# Patient Record
Sex: MALE | Race: WHITE | NOT HISPANIC OR LATINO | Employment: OTHER | URBAN - METROPOLITAN AREA
[De-identification: names, ages, dates, MRNs, and addresses within clinical notes are randomized per-mention and may not be internally consistent; named-entity substitution may affect disease eponyms.]

---

## 2023-01-28 PROBLEM — J45.909 ASTHMA (HHS-HCC): Status: ACTIVE | Noted: 2023-01-28

## 2023-01-28 PROBLEM — J44.9 COPD (CHRONIC OBSTRUCTIVE PULMONARY DISEASE) (MULTI): Status: ACTIVE | Noted: 2023-01-28

## 2023-01-28 PROBLEM — I70.212 ATHEROSCLEROSIS OF NATIVE ARTERY OF LEFT LOWER EXTREMITY WITH INTERMITTENT CLAUDICATION (CMS-HCC): Status: ACTIVE | Noted: 2023-01-28

## 2023-01-28 PROBLEM — G47.10 HYPERSOMNIA WITH SLEEP APNEA: Status: ACTIVE | Noted: 2023-01-28

## 2023-01-28 PROBLEM — M48.07 SPINAL STENOSIS OF LUMBOSACRAL REGION: Status: ACTIVE | Noted: 2023-01-28

## 2023-01-28 PROBLEM — R13.10 DYSPHAGIA: Status: ACTIVE | Noted: 2023-01-28

## 2023-01-28 PROBLEM — R45.5 HOSTILE BEHAVIOR: Status: ACTIVE | Noted: 2023-01-28

## 2023-01-28 PROBLEM — I73.9 PERIPHERAL VASCULAR DISEASE (CMS-HCC): Status: ACTIVE | Noted: 2023-01-28

## 2023-01-28 PROBLEM — F43.10 PTSD (POST-TRAUMATIC STRESS DISORDER): Status: ACTIVE | Noted: 2023-01-28

## 2023-01-28 PROBLEM — G89.4 CHRONIC PAIN DISORDER: Status: ACTIVE | Noted: 2023-01-28

## 2023-01-28 PROBLEM — R46.89: Status: ACTIVE | Noted: 2023-01-28

## 2023-01-28 PROBLEM — F11.20 OPIOID DEPENDENCE ON MAINTENANCE AGONIST THERAPY, NO SYMPTOMS (MULTI): Status: ACTIVE | Noted: 2023-01-28

## 2023-01-28 PROBLEM — M47.816 LUMBAR SPONDYLOSIS: Status: ACTIVE | Noted: 2023-01-28

## 2023-01-28 PROBLEM — M51.369 DEGENERATION OF INTERVERTEBRAL DISC OF LUMBAR REGION: Status: ACTIVE | Noted: 2023-01-28

## 2023-01-28 PROBLEM — B19.20 HEPATITIS-C: Status: ACTIVE | Noted: 2023-01-28

## 2023-01-28 PROBLEM — F41.9 ANXIETY: Status: ACTIVE | Noted: 2023-01-28

## 2023-01-28 PROBLEM — G62.9 PERIPHERAL NEUROPATHY: Status: ACTIVE | Noted: 2023-01-28

## 2023-01-28 PROBLEM — M54.16 LUMBAR RADICULOPATHY: Status: ACTIVE | Noted: 2023-01-28

## 2023-01-28 PROBLEM — M54.9 BACK PAIN: Status: ACTIVE | Noted: 2023-01-28

## 2023-01-28 PROBLEM — E78.5 HYPERLIPIDEMIA: Status: ACTIVE | Noted: 2023-01-28

## 2023-01-28 PROBLEM — E55.9 VITAMIN D DEFICIENCY: Status: ACTIVE | Noted: 2023-01-28

## 2023-01-28 PROBLEM — M79.606 LOWER EXTREMITY PAIN: Status: ACTIVE | Noted: 2023-01-28

## 2023-01-28 PROBLEM — I77.1 STENOSIS OF ILIAC ARTERY (CMS-HCC): Status: ACTIVE | Noted: 2023-01-28

## 2023-01-28 PROBLEM — G47.30 HYPERSOMNIA WITH SLEEP APNEA: Status: ACTIVE | Noted: 2023-01-28

## 2023-01-28 PROBLEM — F17.200 NICOTINE DEPENDENCE: Status: ACTIVE | Noted: 2023-01-28

## 2023-01-28 PROBLEM — E53.9 VITAMIN B DEFICIENCY: Status: ACTIVE | Noted: 2023-01-28

## 2023-01-28 PROBLEM — K75.9 HEPATITIS: Status: ACTIVE | Noted: 2023-01-28

## 2023-01-28 PROBLEM — M51.36 DEGENERATION OF INTERVERTEBRAL DISC OF LUMBAR REGION: Status: ACTIVE | Noted: 2023-01-28

## 2023-01-28 PROBLEM — J30.9 ALLERGIC RHINITIS: Status: ACTIVE | Noted: 2023-01-28

## 2023-01-28 PROBLEM — G47.9 SLEEP DISORDER: Status: ACTIVE | Noted: 2023-01-28

## 2023-01-28 PROBLEM — F32.0 DEPRESSION, MAJOR, SINGLE EPISODE, MILD (CMS-HCC): Status: ACTIVE | Noted: 2023-01-28

## 2023-01-28 PROBLEM — I10 HYPERTENSION: Status: ACTIVE | Noted: 2023-01-28

## 2023-01-28 RX ORDER — AMLODIPINE BESYLATE 5 MG/1
1 TABLET ORAL DAILY
COMMUNITY
End: 2023-03-13 | Stop reason: SDUPTHER

## 2023-01-28 RX ORDER — ATORVASTATIN CALCIUM 10 MG/1
1 TABLET, FILM COATED ORAL NIGHTLY
COMMUNITY
End: 2023-03-13 | Stop reason: SDUPTHER

## 2023-01-28 RX ORDER — BUPRENORPHINE AND NALOXONE 8; 2 MG/1; MG/1
1 FILM, SOLUBLE BUCCAL; SUBLINGUAL 2 TIMES DAILY
COMMUNITY

## 2023-01-28 RX ORDER — CLOPIDOGREL BISULFATE 75 MG/1
1 TABLET ORAL DAILY
COMMUNITY
End: 2024-04-18 | Stop reason: SDUPTHER

## 2023-01-28 RX ORDER — NALOXONE HYDROCHLORIDE 4 MG/.1ML
4 SPRAY NASAL
COMMUNITY

## 2023-01-28 RX ORDER — TIOTROPIUM BROMIDE AND OLODATEROL 3.124; 2.736 UG/1; UG/1
2 SPRAY, METERED RESPIRATORY (INHALATION) DAILY
COMMUNITY

## 2023-01-28 RX ORDER — CLONIDINE HYDROCHLORIDE 0.1 MG/1
1 TABLET ORAL NIGHTLY
COMMUNITY
End: 2023-03-13 | Stop reason: SDUPTHER

## 2023-01-28 RX ORDER — ALBUTEROL SULFATE 90 UG/1
1-2 AEROSOL, METERED RESPIRATORY (INHALATION) EVERY 4 HOURS PRN
COMMUNITY
End: 2023-09-20 | Stop reason: SDUPTHER

## 2023-01-28 RX ORDER — IPRATROPIUM BROMIDE AND ALBUTEROL SULFATE 2.5; .5 MG/3ML; MG/3ML
SOLUTION RESPIRATORY (INHALATION) 4 TIMES DAILY
COMMUNITY
End: 2023-09-20 | Stop reason: SDUPTHER

## 2023-01-28 RX ORDER — LISINOPRIL 40 MG/1
1 TABLET ORAL DAILY
COMMUNITY
End: 2023-03-13 | Stop reason: SDUPTHER

## 2023-03-13 ENCOUNTER — OFFICE VISIT (OUTPATIENT)
Dept: PRIMARY CARE | Facility: CLINIC | Age: 73
End: 2023-03-13
Payer: MEDICARE

## 2023-03-13 VITALS
HEART RATE: 83 BPM | SYSTOLIC BLOOD PRESSURE: 110 MMHG | HEIGHT: 68 IN | RESPIRATION RATE: 14 BRPM | WEIGHT: 167 LBS | DIASTOLIC BLOOD PRESSURE: 80 MMHG | BODY MASS INDEX: 25.31 KG/M2 | OXYGEN SATURATION: 98 %

## 2023-03-13 DIAGNOSIS — I70.212 ATHEROSCLEROSIS OF NATIVE ARTERY OF LEFT LOWER EXTREMITY WITH INTERMITTENT CLAUDICATION (CMS-HCC): ICD-10-CM

## 2023-03-13 DIAGNOSIS — E78.2 MIXED HYPERLIPIDEMIA: ICD-10-CM

## 2023-03-13 DIAGNOSIS — E53.9 VITAMIN B DEFICIENCY: ICD-10-CM

## 2023-03-13 DIAGNOSIS — J42 CHRONIC BRONCHITIS, UNSPECIFIED CHRONIC BRONCHITIS TYPE (MULTI): Primary | ICD-10-CM

## 2023-03-13 DIAGNOSIS — F17.219 CIGARETTE NICOTINE DEPENDENCE WITH NICOTINE-INDUCED DISORDER: ICD-10-CM

## 2023-03-13 DIAGNOSIS — R13.10 DYSPHAGIA, UNSPECIFIED TYPE: ICD-10-CM

## 2023-03-13 DIAGNOSIS — I10 PRIMARY HYPERTENSION: ICD-10-CM

## 2023-03-13 DIAGNOSIS — J45.909 ASTHMA, UNSPECIFIED ASTHMA SEVERITY, UNSPECIFIED WHETHER COMPLICATED, UNSPECIFIED WHETHER PERSISTENT (HHS-HCC): ICD-10-CM

## 2023-03-13 DIAGNOSIS — E55.9 VITAMIN D DEFICIENCY: ICD-10-CM

## 2023-03-13 PROCEDURE — 3074F SYST BP LT 130 MM HG: CPT | Performed by: CLINICAL NURSE SPECIALIST

## 2023-03-13 PROCEDURE — 1159F MED LIST DOCD IN RCRD: CPT | Performed by: CLINICAL NURSE SPECIALIST

## 2023-03-13 PROCEDURE — 99214 OFFICE O/P EST MOD 30 MIN: CPT | Performed by: CLINICAL NURSE SPECIALIST

## 2023-03-13 PROCEDURE — 3079F DIAST BP 80-89 MM HG: CPT | Performed by: CLINICAL NURSE SPECIALIST

## 2023-03-13 RX ORDER — ATORVASTATIN CALCIUM 10 MG/1
10 TABLET, FILM COATED ORAL NIGHTLY
Qty: 90 TABLET | Refills: 1 | Status: SHIPPED | OUTPATIENT
Start: 2023-03-13 | End: 2023-06-28

## 2023-03-13 RX ORDER — LISINOPRIL 40 MG/1
40 TABLET ORAL DAILY
Qty: 90 TABLET | Refills: 1 | Status: SHIPPED | OUTPATIENT
Start: 2023-03-13 | End: 2023-07-07

## 2023-03-13 RX ORDER — CLONIDINE HYDROCHLORIDE 0.1 MG/1
0.1 TABLET ORAL NIGHTLY
Qty: 90 TABLET | Refills: 1 | Status: SHIPPED | OUTPATIENT
Start: 2023-03-13 | End: 2024-01-15

## 2023-03-13 RX ORDER — AMLODIPINE BESYLATE 5 MG/1
5 TABLET ORAL DAILY
Qty: 90 TABLET | Refills: 1 | Status: SHIPPED | OUTPATIENT
Start: 2023-03-13 | End: 2023-06-28

## 2023-03-13 ASSESSMENT — ENCOUNTER SYMPTOMS
HEMATURIA: 0
ACTIVITY CHANGE: 0
DYSURIA: 0
DIARRHEA: 0
MYALGIAS: 0
NECK PAIN: 0
ABDOMINAL PAIN: 0
FEVER: 0
BRUISES/BLEEDS EASILY: 0
BLOOD IN STOOL: 0
POLYDIPSIA: 0
UNEXPECTED WEIGHT CHANGE: 0
SORE THROAT: 0
HEADACHES: 0
TROUBLE SWALLOWING: 0
BACK PAIN: 0
CONFUSION: 0
CHILLS: 0
PALPITATIONS: 0
SLEEP DISTURBANCE: 0
EYE PAIN: 0
DEPRESSION: 0
FATIGUE: 0
SEIZURES: 0
ARTHRALGIAS: 1
COUGH: 0
PHOTOPHOBIA: 0
CHEST TIGHTNESS: 0
DIZZINESS: 0
SHORTNESS OF BREATH: 0
LOSS OF SENSATION IN FEET: 0
FLANK PAIN: 0
JOINT SWELLING: 0
WOUND: 0
APPETITE CHANGE: 0
CONSTIPATION: 0
OCCASIONAL FEELINGS OF UNSTEADINESS: 0
NAUSEA: 0
WHEEZING: 0
VOMITING: 0

## 2023-03-13 ASSESSMENT — PATIENT HEALTH QUESTIONNAIRE - PHQ9
1. LITTLE INTEREST OR PLEASURE IN DOING THINGS: NOT AT ALL
SUM OF ALL RESPONSES TO PHQ9 QUESTIONS 1 AND 2: 0
2. FEELING DOWN, DEPRESSED OR HOPELESS: NOT AT ALL

## 2023-03-13 NOTE — PROGRESS NOTES
Subjective   Patient ID: Joe Hester is a 72 y.o. male who presents for No chief complaint on file..  Here today for a follow up.     Previously seen with complaints of bilateral lower extremity discomfort. Following with Dr. Marte for Vascular. Referred to Dr. Bucio for spine evaluation of Neuropathic Pain, Bilateral lower extremities. Patient has Medical Marijuana.     Has been [facility] in [facility]. Patient states that he is down to 5 cigarettes per day, continuing to work on Cessation. Now following with Pulmonology. Chronic shortness of breath with exertion. COPD. Patient follows with Dr. Engel for Pulmonology. Inhalers adjusted.    Lisinopril, Amlodipine, and Clonidine for his Hypertension, tolerating Rx. Has been on Suboxone, [facility].     Trouble with Anxiety and Stress. PTSD related. Did not tolerate medications in the past. Triggered by his history of Incarceration. Hopeful that Medical Marijuana will continue to provide him some benefit. Has noted some improvement with starting the medical marijuana.     Now following with [facility] for Suboxone. Following twice a month. Suboxone dosage has been the same. Planing to transition from the Strips to the oral medication.     States that years ago had his Esophagus stretched during EGD. Complaints of issues with swallowing again, interest in referral.       Review of Systems   Constitutional:  Negative for activity change, appetite change, chills, fatigue, fever and unexpected weight change.   HENT:  Negative for ear pain, hearing loss, nosebleeds, sore throat, tinnitus and trouble swallowing.    Eyes:  Negative for photophobia, pain and visual disturbance.   Respiratory:  Negative for cough, chest tightness, shortness of breath and wheezing.    Cardiovascular:  Negative for chest pain, palpitations and leg swelling.   Gastrointestinal:  Negative for abdominal pain, blood in stool, constipation, diarrhea, nausea  and vomiting.   Endocrine: Negative for cold intolerance, heat intolerance, polydipsia and polyuria.   Genitourinary:  Negative for dysuria, flank pain and hematuria.   Musculoskeletal:  Positive for arthralgias. Negative for back pain, joint swelling, myalgias and neck pain.   Skin:  Negative for pallor, rash and wound.   Allergic/Immunologic: Negative for immunocompromised state.   Neurological:  Negative for dizziness, seizures and headaches.   Hematological:  Does not bruise/bleed easily.   Psychiatric/Behavioral:  Negative for confusion and sleep disturbance.        Objective   Physical Exam  Constitutional:       General: He is not in acute distress.     Appearance: Normal appearance.   HENT:      Head: Normocephalic.      Nose: Nose normal.   Eyes:      Conjunctiva/sclera: Conjunctivae normal.   Neck:      Vascular: No carotid bruit.   Cardiovascular:      Rate and Rhythm: Normal rate and regular rhythm.      Pulses: Normal pulses.      Heart sounds: Normal heart sounds.   Pulmonary:      Effort: Pulmonary effort is normal.      Breath sounds: Normal breath sounds.   Abdominal:      General: Bowel sounds are normal.      Palpations: Abdomen is soft.   Musculoskeletal:         General: Normal range of motion.      Cervical back: Normal range of motion.   Skin:     General: Skin is warm and dry.   Neurological:      Mental Status: He is alert and oriented to person, place, and time. Mental status is at baseline.   Psychiatric:         Mood and Affect: Mood normal.         Behavior: Behavior normal.       Assessment/Plan         New order for blood work provided at OV today.     #1. Hypertension: Blood pressure controlled at OV today. Continue Lisinopril, Clonidine and Norvasc. DASH diet. Ambulatory blood pressure monitoring.   #2. COPD: Chronic shortness of breath with exertion. Following with Pulmonology.   #3. Anxiety and Depression: PHQ-9 and ASYA-7 Completed previously. Did not tolerate Wellbutrin. States  that he was previously on multiple medications. Prozac, Xanax, Trazodone and Zoloft. Unsure of the other medications. Discontinued Lexapro. Ativan and Klonopin PRN, did not tolerate. Valium PRN effective in the past. Unable to continue to prescribe due to inconsistencies with Drug Screen. Patient aware. Now following for Medical Marijuana.   #4. Tobacco Use: Patient advised to quit smoking tobacco. The risks of smoking were reviewed with the patient and he was offered medication and/or smoking cessation counseling to help. Did not tolerate Wellbutrin. Declined any other Rx. at this time. Currently smoking 5 cigarettes/day.  #5. Vitamin B12 Deficiency: Continue Vitamin B12 OTC. Reevaluate with next blood work.  #6. Vitamin D Deficiency: Reevaluate with next blood work.  #7. Hepatitis C: Completed treatment through GI previously years ago. Recent testing indicated Viral Load not detected.   #8. 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 Stenosis of Iliac Artery, PVD: Following with Dr. Marte for management/evaluation. Continue Plavix. Encouraged smoking cessation.   #10. Hyperlipidemia: Lipitor. Lifestyle changes. Reevaluate with next lab work.   #11. Opioid Dependence: Following with Dr. Drew for management with Suboxone.     PSA Exam: May 2017. New order provided at OV today.   Colonoscopy: January 2016. Recommended to repeat in 10 years.   Medicare Wellness: September 2022.   Flu Vaccine: October 2020  Pneumovax: December 2020   Prevnar: December 2017.   COVID Vaccine: March/November 2021. Discussed Booster.  Flu Vaccine: September 2022

## 2023-04-13 ENCOUNTER — OFFICE VISIT (OUTPATIENT)
Dept: PRIMARY CARE | Facility: CLINIC | Age: 73
End: 2023-04-13
Payer: MEDICARE

## 2023-04-13 VITALS
HEART RATE: 73 BPM | BODY MASS INDEX: 23.64 KG/M2 | DIASTOLIC BLOOD PRESSURE: 68 MMHG | HEIGHT: 68 IN | SYSTOLIC BLOOD PRESSURE: 140 MMHG | WEIGHT: 156 LBS

## 2023-04-13 DIAGNOSIS — J42 CHRONIC BRONCHITIS, UNSPECIFIED CHRONIC BRONCHITIS TYPE (MULTI): ICD-10-CM

## 2023-04-13 DIAGNOSIS — M54.16 LUMBAR RADICULOPATHY: Primary | ICD-10-CM

## 2023-04-13 DIAGNOSIS — J45.909 ASTHMA, UNSPECIFIED ASTHMA SEVERITY, UNSPECIFIED WHETHER COMPLICATED, UNSPECIFIED WHETHER PERSISTENT (HHS-HCC): ICD-10-CM

## 2023-04-13 DIAGNOSIS — I73.9 PERIPHERAL VASCULAR DISEASE (CMS-HCC): ICD-10-CM

## 2023-04-13 DIAGNOSIS — I70.212 ATHEROSCLEROSIS OF NATIVE ARTERY OF LEFT LOWER EXTREMITY WITH INTERMITTENT CLAUDICATION (CMS-HCC): ICD-10-CM

## 2023-04-13 DIAGNOSIS — I10 PRIMARY HYPERTENSION: ICD-10-CM

## 2023-04-13 PROCEDURE — 1159F MED LIST DOCD IN RCRD: CPT | Performed by: CLINICAL NURSE SPECIALIST

## 2023-04-13 PROCEDURE — 3077F SYST BP >= 140 MM HG: CPT | Performed by: CLINICAL NURSE SPECIALIST

## 2023-04-13 PROCEDURE — 1160F RVW MEDS BY RX/DR IN RCRD: CPT | Performed by: CLINICAL NURSE SPECIALIST

## 2023-04-13 PROCEDURE — 99214 OFFICE O/P EST MOD 30 MIN: CPT | Performed by: CLINICAL NURSE SPECIALIST

## 2023-04-13 PROCEDURE — 3078F DIAST BP <80 MM HG: CPT | Performed by: CLINICAL NURSE SPECIALIST

## 2023-04-13 RX ORDER — CHLORTHALIDONE 25 MG/1
25 TABLET ORAL DAILY
Qty: 30 TABLET | Refills: 2 | Status: SHIPPED | OUTPATIENT
Start: 2023-04-13 | End: 2023-05-13

## 2023-04-13 RX ORDER — GABAPENTIN 300 MG/1
300 CAPSULE ORAL NIGHTLY
Qty: 30 CAPSULE | Refills: 2 | Status: SHIPPED | OUTPATIENT
Start: 2023-04-13 | End: 2023-07-12

## 2023-04-13 ASSESSMENT — PATIENT HEALTH QUESTIONNAIRE - PHQ9
2. FEELING DOWN, DEPRESSED OR HOPELESS: NOT AT ALL
1. LITTLE INTEREST OR PLEASURE IN DOING THINGS: NOT AT ALL
SUM OF ALL RESPONSES TO PHQ9 QUESTIONS 1 AND 2: 0

## 2023-04-13 ASSESSMENT — COLUMBIA-SUICIDE SEVERITY RATING SCALE - C-SSRS
6. HAVE YOU EVER DONE ANYTHING, STARTED TO DO ANYTHING, OR PREPARED TO DO ANYTHING TO END YOUR LIFE?: NO
2. HAVE YOU ACTUALLY HAD ANY THOUGHTS OF KILLING YOURSELF?: NO
1. IN THE PAST MONTH, HAVE YOU WISHED YOU WERE DEAD OR WISHED YOU COULD GO TO SLEEP AND NOT WAKE UP?: NO

## 2023-04-13 ASSESSMENT — ENCOUNTER SYMPTOMS
OCCASIONAL FEELINGS OF UNSTEADINESS: 0
LOSS OF SENSATION IN FEET: 0
DEPRESSION: 0

## 2023-04-13 NOTE — PROGRESS NOTES
Subjective   Patient ID: Joe Hester is a 72 y.o. male who presents for Follow-up (Discuss blood pressure running high).  Here today for a follow up, blood pressure has been running high.     Previously seen with complaints of bilateral lower extremity discomfort. Following with Dr. Marte for Vascular. Referred to Dr. Bucio for spine evaluation of Neuropathic Pain, Bilateral lower extremities. Patient has Medical Marijuana. Requesting to restart Gabapentin.     Has been living in [facility]. Patient states that he is down to 5 cigarettes per day, continuing to work on Cessation. Now following with Pulmonology. Chronic shortness of breath with exertion. COPD. Patient follows with Dr. Engel for Pulmonology. Inhalers adjusted.    Lisinopril, Amlodipine, and Clonidine for his Hypertension, tolerating Rx. Has been on Suboxone, following [facility] at [facility]. Blood pressure has been elevated over the past month.     Trouble with Anxiety and Stress. PTSD related. Did not tolerate medications in the past. Triggered by his history of Incarceration. Hopeful that Medical Marijuana will continue to provide him some benefit. Has noted some improvement with starting the medical marijuana.     Now following with [facility] for Suboxone. Following twice a month. Suboxone dosage has been the same. Changed to the oral medication from the Strips and is not happy with how it is working for him. Planning to discuss restarting strips.     States that years ago had his Esophagus stretched during EGD. Complaints of issues with swallowing again, interest in referral. Provided at last OV.     Review of Systems   Constitutional:  Negative for activity change, appetite change, chills, fatigue, fever and unexpected weight change.   HENT:  Negative for ear pain, hearing loss, nosebleeds, sore throat, tinnitus and trouble swallowing.    Eyes:  Negative for photophobia, pain and visual disturbance.   Respiratory:   Negative for cough, chest tightness, shortness of breath and wheezing.    Cardiovascular:  Negative for chest pain, palpitations and leg swelling.   Gastrointestinal:  Negative for abdominal pain, blood in stool, constipation, diarrhea, nausea and vomiting.   Endocrine: Negative for cold intolerance, heat intolerance, polydipsia and polyuria.   Genitourinary:  Negative for dysuria, flank pain and hematuria.   Musculoskeletal:  Positive for arthralgias. Negative for back pain, joint swelling, myalgias and neck pain.   Skin:  Negative for pallor, rash and wound.   Allergic/Immunologic: Negative for immunocompromised state.   Neurological:  Negative for dizziness, seizures and headaches.   Hematological:  Does not bruise/bleed easily.   Psychiatric/Behavioral:  Negative for confusion and sleep disturbance.        Objective   Physical Exam  Constitutional:       General: He is not in acute distress.     Appearance: Normal appearance.   HENT:      Head: Normocephalic.      Nose: Nose normal.   Eyes:      Conjunctiva/sclera: Conjunctivae normal.   Neck:      Vascular: No carotid bruit.   Cardiovascular:      Rate and Rhythm: Normal rate and regular rhythm.      Pulses: Normal pulses.      Heart sounds: Normal heart sounds.   Pulmonary:      Effort: Pulmonary effort is normal.      Breath sounds: Normal breath sounds.   Abdominal:      General: Bowel sounds are normal.      Palpations: Abdomen is soft.   Musculoskeletal:         General: Normal range of motion.      Cervical back: Normal range of motion.   Skin:     General: Skin is warm and dry.   Neurological:      Mental Status: He is alert and oriented to person, place, and time. Mental status is at baseline.   Psychiatric:         Mood and Affect: Mood normal.         Behavior: Behavior normal.       Assessment/Plan     New order for blood work provided at  today.     #1. Hypertension: Blood pressure uncontrolled at  today. Continue Lisinopril, Clonidine and  Norvasc. DASH diet. Ambulatory blood pressure monitoring. Add Chlorthalidone. Blood work to be done and follow up in 1 month.   #2. COPD: Chronic shortness of breath with exertion. Following with Pulmonology.   #3. Anxiety and Depression: PHQ-9 and ASYA-7 Completed previously. Did not tolerate Wellbutrin. States that he was previously on multiple medications. Prozac, Xanax, Trazodone and Zoloft. Unsure of the other medications. Discontinued Lexapro. Ativan and Klonopin PRN, did not tolerate. Valium PRN effective in the past. Unable to continue to prescribe due to inconsistencies with Drug Screen. Patient aware. Now following for Medical Marijuana.   #4. Tobacco Use: Patient advised to quit smoking tobacco. The risks of smoking were reviewed with the patient and he was offered medication and/or smoking cessation counseling to help. Did not tolerate Wellbutrin. Declined any other Rx. at this time. Currently smoking 5 cigarettes/day.  #5. Vitamin B12 Deficiency: Continue Vitamin B12 OTC. Reevaluate with next blood work.  #6. Vitamin D Deficiency: Reevaluate with next blood work.  #7. Hepatitis C: Completed treatment through GI previously years ago. Recent testing indicated Viral Load not detected.   #8. 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 Stenosis of Iliac Artery, PVD: Following with Dr. Marte for management/evaluation. Continue Plavix. Encouraged smoking cessation.   #10. Hyperlipidemia: Lipitor. Lifestyle changes. Reevaluate with next lab work.   #11. Opioid Dependence: Following with Specialist for management with Suboxone.     PSA Exam: May 2017. New order provided.  Colonoscopy: January 2016. Recommended to repeat in 10 years.   Medicare Wellness: September 2022.   Flu Vaccine: October 2020  Pneumovax: December 2020   Prevnar: December 2017.   COVID Vaccine: March/November 2021. Discussed Booster.  Flu Vaccine: September 2022

## 2023-05-04 ENCOUNTER — HOSPITAL ENCOUNTER (OUTPATIENT)
Dept: DATA CONVERSION | Facility: HOSPITAL | Age: 73
End: 2023-05-04
Attending: INTERNAL MEDICINE | Admitting: INTERNAL MEDICINE
Payer: MEDICARE

## 2023-05-04 DIAGNOSIS — I73.9 PERIPHERAL VASCULAR DISEASE, UNSPECIFIED (CMS-HCC): ICD-10-CM

## 2023-05-04 DIAGNOSIS — Z79.02 LONG TERM (CURRENT) USE OF ANTITHROMBOTICS/ANTIPLATELETS: ICD-10-CM

## 2023-05-04 DIAGNOSIS — K29.70 GASTRITIS, UNSPECIFIED, WITHOUT BLEEDING: ICD-10-CM

## 2023-05-04 DIAGNOSIS — K31.9 DISEASE OF STOMACH AND DUODENUM, UNSPECIFIED: ICD-10-CM

## 2023-05-04 DIAGNOSIS — E78.5 HYPERLIPIDEMIA, UNSPECIFIED: ICD-10-CM

## 2023-05-04 DIAGNOSIS — Z79.01 LONG TERM (CURRENT) USE OF ANTICOAGULANTS: ICD-10-CM

## 2023-05-04 DIAGNOSIS — F41.9 ANXIETY DISORDER, UNSPECIFIED: ICD-10-CM

## 2023-05-04 DIAGNOSIS — I10 ESSENTIAL (PRIMARY) HYPERTENSION: ICD-10-CM

## 2023-05-04 DIAGNOSIS — J44.9 CHRONIC OBSTRUCTIVE PULMONARY DISEASE, UNSPECIFIED (MULTI): ICD-10-CM

## 2023-05-04 DIAGNOSIS — F32.A DEPRESSION, UNSPECIFIED: ICD-10-CM

## 2023-05-04 DIAGNOSIS — R13.10 DYSPHAGIA, UNSPECIFIED: ICD-10-CM

## 2023-05-04 DIAGNOSIS — F17.210 NICOTINE DEPENDENCE, CIGARETTES, UNCOMPLICATED: ICD-10-CM

## 2023-05-04 DIAGNOSIS — G89.4 CHRONIC PAIN SYNDROME: ICD-10-CM

## 2023-05-04 DIAGNOSIS — F43.10 POST-TRAUMATIC STRESS DISORDER, UNSPECIFIED: ICD-10-CM

## 2023-05-11 LAB
COMPLETE PATHOLOGY REPORT: NORMAL
CONVERTED CLINICAL DIAGNOSIS-HISTORY: NORMAL
CONVERTED FINAL DIAGNOSIS: NORMAL
CONVERTED FINAL REPORT PDF LINK TO COPY AND PASTE: NORMAL
CONVERTED GROSS DESCRIPTION: NORMAL

## 2023-05-19 ENCOUNTER — APPOINTMENT (OUTPATIENT)
Dept: PRIMARY CARE | Facility: CLINIC | Age: 73
End: 2023-05-19
Payer: MEDICARE

## 2023-06-28 DIAGNOSIS — I10 PRIMARY HYPERTENSION: ICD-10-CM

## 2023-06-28 DIAGNOSIS — E53.9 VITAMIN B DEFICIENCY: ICD-10-CM

## 2023-06-28 DIAGNOSIS — I70.212 ATHEROSCLEROSIS OF NATIVE ARTERY OF LEFT LOWER EXTREMITY WITH INTERMITTENT CLAUDICATION (CMS-HCC): ICD-10-CM

## 2023-06-28 DIAGNOSIS — E55.9 VITAMIN D DEFICIENCY: ICD-10-CM

## 2023-06-28 DIAGNOSIS — F17.219 CIGARETTE NICOTINE DEPENDENCE WITH NICOTINE-INDUCED DISORDER: ICD-10-CM

## 2023-06-28 DIAGNOSIS — E78.2 MIXED HYPERLIPIDEMIA: ICD-10-CM

## 2023-06-28 DIAGNOSIS — J45.909 ASTHMA, UNSPECIFIED ASTHMA SEVERITY, UNSPECIFIED WHETHER COMPLICATED, UNSPECIFIED WHETHER PERSISTENT (HHS-HCC): ICD-10-CM

## 2023-06-28 DIAGNOSIS — J42 CHRONIC BRONCHITIS, UNSPECIFIED CHRONIC BRONCHITIS TYPE (MULTI): ICD-10-CM

## 2023-06-28 RX ORDER — AMLODIPINE BESYLATE 5 MG/1
5 TABLET ORAL DAILY
Qty: 90 TABLET | Refills: 1 | Status: SHIPPED | OUTPATIENT
Start: 2023-06-28 | End: 2024-01-16

## 2023-06-28 RX ORDER — ATORVASTATIN CALCIUM 10 MG/1
10 TABLET, FILM COATED ORAL NIGHTLY
Qty: 90 TABLET | Refills: 1 | Status: SHIPPED | OUTPATIENT
Start: 2023-06-28 | End: 2024-01-16

## 2023-07-07 DIAGNOSIS — F17.219 CIGARETTE NICOTINE DEPENDENCE WITH NICOTINE-INDUCED DISORDER: ICD-10-CM

## 2023-07-07 DIAGNOSIS — I70.212 ATHEROSCLEROSIS OF NATIVE ARTERY OF LEFT LOWER EXTREMITY WITH INTERMITTENT CLAUDICATION (CMS-HCC): ICD-10-CM

## 2023-07-07 DIAGNOSIS — J42 CHRONIC BRONCHITIS, UNSPECIFIED CHRONIC BRONCHITIS TYPE (MULTI): ICD-10-CM

## 2023-07-07 DIAGNOSIS — I10 PRIMARY HYPERTENSION: ICD-10-CM

## 2023-07-07 DIAGNOSIS — E53.9 VITAMIN B DEFICIENCY: ICD-10-CM

## 2023-07-07 DIAGNOSIS — J45.909 ASTHMA, UNSPECIFIED ASTHMA SEVERITY, UNSPECIFIED WHETHER COMPLICATED, UNSPECIFIED WHETHER PERSISTENT (HHS-HCC): ICD-10-CM

## 2023-07-07 DIAGNOSIS — E55.9 VITAMIN D DEFICIENCY: ICD-10-CM

## 2023-07-07 DIAGNOSIS — E78.2 MIXED HYPERLIPIDEMIA: ICD-10-CM

## 2023-07-07 RX ORDER — LISINOPRIL 40 MG/1
40 TABLET ORAL DAILY
Qty: 90 TABLET | Refills: 1 | Status: SHIPPED | OUTPATIENT
Start: 2023-07-07

## 2023-09-07 VITALS — BODY MASS INDEX: 23.72 KG/M2 | HEIGHT: 68 IN | WEIGHT: 156.53 LBS

## 2023-09-20 ENCOUNTER — OFFICE VISIT (OUTPATIENT)
Dept: PRIMARY CARE | Facility: CLINIC | Age: 73
End: 2023-09-20
Payer: MEDICARE

## 2023-09-20 VITALS
WEIGHT: 152 LBS | BODY MASS INDEX: 23.04 KG/M2 | HEIGHT: 68 IN | SYSTOLIC BLOOD PRESSURE: 126 MMHG | DIASTOLIC BLOOD PRESSURE: 80 MMHG | HEART RATE: 72 BPM

## 2023-09-20 DIAGNOSIS — J45.909 ASTHMA, UNSPECIFIED ASTHMA SEVERITY, UNSPECIFIED WHETHER COMPLICATED, UNSPECIFIED WHETHER PERSISTENT (HHS-HCC): ICD-10-CM

## 2023-09-20 DIAGNOSIS — I70.212 ATHEROSCLEROSIS OF NATIVE ARTERY OF LEFT LOWER EXTREMITY WITH INTERMITTENT CLAUDICATION (CMS-HCC): ICD-10-CM

## 2023-09-20 DIAGNOSIS — Z23 NEED FOR INFLUENZA VACCINATION: ICD-10-CM

## 2023-09-20 DIAGNOSIS — B18.2 CHRONIC HEPATITIS C WITHOUT HEPATIC COMA (MULTI): ICD-10-CM

## 2023-09-20 DIAGNOSIS — I10 PRIMARY HYPERTENSION: ICD-10-CM

## 2023-09-20 DIAGNOSIS — E55.9 VITAMIN D DEFICIENCY: ICD-10-CM

## 2023-09-20 DIAGNOSIS — Z00.00 MEDICARE ANNUAL WELLNESS VISIT, SUBSEQUENT: ICD-10-CM

## 2023-09-20 DIAGNOSIS — F11.20 OPIOID DEPENDENCE ON MAINTENANCE AGONIST THERAPY, NO SYMPTOMS (MULTI): ICD-10-CM

## 2023-09-20 DIAGNOSIS — E78.2 MIXED HYPERLIPIDEMIA: ICD-10-CM

## 2023-09-20 DIAGNOSIS — F32.0 DEPRESSION, MAJOR, SINGLE EPISODE, MILD (CMS-HCC): ICD-10-CM

## 2023-09-20 DIAGNOSIS — F17.219 CIGARETTE NICOTINE DEPENDENCE WITH NICOTINE-INDUCED DISORDER: ICD-10-CM

## 2023-09-20 DIAGNOSIS — Z12.5 PROSTATE CANCER SCREENING: Primary | ICD-10-CM

## 2023-09-20 PROCEDURE — 1160F RVW MEDS BY RX/DR IN RCRD: CPT | Performed by: CLINICAL NURSE SPECIALIST

## 2023-09-20 PROCEDURE — G0439 PPPS, SUBSEQ VISIT: HCPCS | Performed by: CLINICAL NURSE SPECIALIST

## 2023-09-20 PROCEDURE — 3079F DIAST BP 80-89 MM HG: CPT | Performed by: CLINICAL NURSE SPECIALIST

## 2023-09-20 PROCEDURE — 90662 IIV NO PRSV INCREASED AG IM: CPT | Performed by: CLINICAL NURSE SPECIALIST

## 2023-09-20 PROCEDURE — 99214 OFFICE O/P EST MOD 30 MIN: CPT | Performed by: CLINICAL NURSE SPECIALIST

## 2023-09-20 PROCEDURE — 1159F MED LIST DOCD IN RCRD: CPT | Performed by: CLINICAL NURSE SPECIALIST

## 2023-09-20 PROCEDURE — G0444 DEPRESSION SCREEN ANNUAL: HCPCS | Performed by: CLINICAL NURSE SPECIALIST

## 2023-09-20 PROCEDURE — 1170F FXNL STATUS ASSESSED: CPT | Performed by: CLINICAL NURSE SPECIALIST

## 2023-09-20 PROCEDURE — 4004F PT TOBACCO SCREEN RCVD TLK: CPT | Performed by: CLINICAL NURSE SPECIALIST

## 2023-09-20 PROCEDURE — G0008 ADMIN INFLUENZA VIRUS VAC: HCPCS | Performed by: CLINICAL NURSE SPECIALIST

## 2023-09-20 PROCEDURE — 3074F SYST BP LT 130 MM HG: CPT | Performed by: CLINICAL NURSE SPECIALIST

## 2023-09-20 RX ORDER — ALBUTEROL SULFATE 90 UG/1
1-2 AEROSOL, METERED RESPIRATORY (INHALATION) EVERY 4 HOURS PRN
Qty: 18 G | Refills: 2 | Status: SHIPPED | OUTPATIENT
Start: 2023-09-20

## 2023-09-20 RX ORDER — IPRATROPIUM BROMIDE AND ALBUTEROL SULFATE 2.5; .5 MG/3ML; MG/3ML
3 SOLUTION RESPIRATORY (INHALATION)
Qty: 225 ML | Refills: 1 | Status: SHIPPED | OUTPATIENT
Start: 2023-09-20 | End: 2024-02-21 | Stop reason: SDUPTHER

## 2023-09-20 ASSESSMENT — ENCOUNTER SYMPTOMS
PHOTOPHOBIA: 0
LOSS OF SENSATION IN FEET: 0
TROUBLE SWALLOWING: 0
HEADACHES: 0
CONSTIPATION: 0
WOUND: 0
BRUISES/BLEEDS EASILY: 0
ABDOMINAL PAIN: 0
SEIZURES: 0
UNEXPECTED WEIGHT CHANGE: 0
PALPITATIONS: 0
DIZZINESS: 0
CHILLS: 0
DIARRHEA: 0
ACTIVITY CHANGE: 0
DYSURIA: 0
SORE THROAT: 0
NECK PAIN: 0
JOINT SWELLING: 0
CONFUSION: 0
BACK PAIN: 0
APPETITE CHANGE: 0
DEPRESSION: 0
NAUSEA: 0
POLYDIPSIA: 0
BLOOD IN STOOL: 0
OCCASIONAL FEELINGS OF UNSTEADINESS: 0
CHEST TIGHTNESS: 0
FEVER: 0
SLEEP DISTURBANCE: 0
EYE PAIN: 0
ARTHRALGIAS: 0
COUGH: 0
MYALGIAS: 0
HEMATURIA: 0
SHORTNESS OF BREATH: 0
WHEEZING: 0
FLANK PAIN: 0
VOMITING: 0
FATIGUE: 0

## 2023-09-20 ASSESSMENT — ACTIVITIES OF DAILY LIVING (ADL)
DRESSING: INDEPENDENT
DOING_HOUSEWORK: INDEPENDENT
MANAGING_FINANCES: INDEPENDENT
BATHING: INDEPENDENT
GROCERY_SHOPPING: INDEPENDENT
TAKING_MEDICATION: INDEPENDENT

## 2023-09-20 ASSESSMENT — PATIENT HEALTH QUESTIONNAIRE - PHQ9
SUM OF ALL RESPONSES TO PHQ9 QUESTIONS 1 AND 2: 0
1. LITTLE INTEREST OR PLEASURE IN DOING THINGS: NOT AT ALL
2. FEELING DOWN, DEPRESSED OR HOPELESS: NOT AT ALL

## 2023-09-20 NOTE — PROGRESS NOTES
Subjective   Reason for Visit: Joe Hester is an 72 y.o. male here for a Medicare Wellness visit.     Past Medical, Surgical, and Family History reviewed and updated in chart.    Reviewed all medications by prescribing practitioner or clinical pharmacist (such as prescriptions, OTCs, herbal therapies and supplements) and documented in the medical record.    HPI    Here today for a follow up. Due for Medicare Wellness.      Previously seen with complaints of bilateral lower extremity discomfort. Following with Dr. Marte for Vascular. Referred to Dr. Bucio for spine evaluation of Neuropathic Pain, Bilateral lower extremities. Patient has Medical Marijuana.      Has been living in [facility]. Patient states that he is down to 5 cigarettes per day, continuing to work on Cessation. Now following with Pulmonology. Chronic shortness of breath with exertion. COPD. Patient follows with Dr. Engel for Pulmonology. Inhalers adjusted. Due for a follow up appointment.      Lisinopril, Amlodipine, and Clonidine for his Hypertension, tolerating Rx. Has been on Suboxone, following [facility] at [facility]. Blood pressure has been better controlled.      Trouble with Anxiety and Stress. PTSD related. Did not tolerate medications in the past. Triggered by his history of Incarceration. Hopeful that Medical Marijuana will continue to provide him some benefit. Has noted some improvement with starting the medical marijuana.      Now following with [facility] for Suboxone. Following twice a month. Suboxone dosage has been the same.      Patient Care Team:  AMANDA Mejía-CNS as PCP - General (Internal Medicine)  Alia Drew MD PhD as PCP - [facility] Attributed Provider  Abdulkadir Engel MD as Surgeon (Pulmonary Disease)     Review of Systems   Constitutional:  Negative for activity change, appetite change, chills, fatigue, fever and unexpected weight change.   HENT:  Negative for ear pain, hearing loss,  "nosebleeds, sore throat, tinnitus and trouble swallowing.    Eyes:  Negative for photophobia, pain and visual disturbance.   Respiratory:  Negative for cough, chest tightness, shortness of breath and wheezing.    Cardiovascular:  Negative for chest pain, palpitations and leg swelling.   Gastrointestinal:  Negative for abdominal pain, blood in stool, constipation, diarrhea, nausea and vomiting.   Endocrine: Negative for cold intolerance, heat intolerance, polydipsia and polyuria.   Genitourinary:  Negative for dysuria, flank pain and hematuria.   Musculoskeletal:  Negative for arthralgias, back pain, joint swelling, myalgias and neck pain.   Skin:  Negative for pallor, rash and wound.   Allergic/Immunologic: Negative for immunocompromised state.   Neurological:  Negative for dizziness, seizures and headaches.   Hematological:  Does not bruise/bleed easily.   Psychiatric/Behavioral:  Negative for confusion and sleep disturbance.        Objective   Vitals:  /80 (BP Location: Right arm, Patient Position: Sitting)   Pulse 72   Ht 1.727 m (5' 8\")   Wt 68.9 kg (152 lb)   BMI 23.11 kg/m²       Physical Exam  Vitals and nursing note reviewed.   Constitutional:       General: He is not in acute distress.     Appearance: Normal appearance.   HENT:      Head: Normocephalic.      Nose: Nose normal.   Eyes:      Conjunctiva/sclera: Conjunctivae normal.   Neck:      Vascular: No carotid bruit.   Cardiovascular:      Rate and Rhythm: Normal rate and regular rhythm.      Pulses: Normal pulses.      Heart sounds: Normal heart sounds.   Pulmonary:      Effort: Pulmonary effort is normal.      Breath sounds: Normal breath sounds.   Abdominal:      General: Bowel sounds are normal.      Palpations: Abdomen is soft.   Musculoskeletal:         General: Normal range of motion.      Cervical back: Normal range of motion.   Skin:     General: Skin is warm and dry.   Neurological:      Mental Status: He is alert and oriented to " person, place, and time. Mental status is at baseline.   Psychiatric:         Mood and Affect: Mood normal.         Behavior: Behavior normal.         Assessment/Plan   Problem List Items Addressed This Visit       Asthma    Atherosclerosis of native artery of left lower extremity with intermittent claudication (CMS/[facility])    Overview     Last impress: 2/25/22 - Chronic condition documentation - worsening based on symptoms and exam. Treatment plan established. Disease monitoring and precautions, any treatment changes, patient instructions, and follow-up are documented in encounter note.         COPD (chronic obstructive pulmonary disease) (CMS/[facility])    Hyperlipidemia    Nicotine dependence    Hypertension    Vitamin B deficiency    Vitamin D deficiency        New order for blood work provided at OV today.      #1. Hypertension: Blood pressure better controlled at OV today. Continue Lisinopril, Clonidine, Chlorthalidone, and Norvasc. DASH diet. Ambulatory blood pressure monitoring. Lab work ordered.   #2. COPD: Chronic shortness of breath with exertion. Following with Pulmonology. Due for a follow up appointment at this time.   #3. Anxiety and Depression: Did not tolerate Wellbutrin. States that he was previously on multiple medications. Prozac, Xanax, Trazodone and Zoloft. Unsure of the other medications. Discontinued Lexapro. Ativan and Klonopin PRN, did not tolerate. Valium PRN effective in the past. Unable to continue to prescribe due to inconsistencies with Drug Screen. Patient aware. Now following for Medical Marijuana.   #4. Tobacco Use: Patient advised to quit smoking tobacco. The risks of smoking were reviewed with the patient and he was offered medication and/or smoking cessation counseling to help. Did not tolerate Wellbutrin. Declined any other Rx. at this time. Currently smoking 5 cigarettes/day.  #5. Vitamin B12 Deficiency: Continue Vitamin B12 OTC. Reevaluate with next blood work.  #6. Vitamin D  Deficiency: Reevaluate with next blood work.  #7. Hepatitis C: Completed treatment through GI previously years ago. Recent testing indicated Viral Load not detected.   #8. 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 Stenosis of Iliac Artery, PVD: Following with Dr. Marte for management/evaluation. Continue Plavix. Encouraged smoking cessation.   #10. Hyperlipidemia: Lipitor. Lifestyle changes. Reevaluate with next lab work.   #11. Opioid Dependence: Following with Specialist for management with Suboxone.   #12. Medicare Wellness: Routine and age appropriate recommendations discussed with the patient today and patient verbalized understanding of the recommendations.  Questions answered.  Age appropriate immunizations and preventative screenings discussed with the patient and ordered as appropriate. Labs updated and ordered as indicated. Recommend healthy diet and daily exercise to maintain healthy body weight.      PSA Exam: May 2017. To be done with next lab work.   Colonoscopy: January 2016. Recommended to repeat in 10 years.   Medicare Wellness: September 2023.   Pneumovax: December 2020   Prevnar: December 2017.   COVID Vaccine: March/November 2021. Discussed Booster.  Flu Vaccine: September 2023.

## 2024-01-15 DIAGNOSIS — E78.2 MIXED HYPERLIPIDEMIA: ICD-10-CM

## 2024-01-15 DIAGNOSIS — E53.9 VITAMIN B DEFICIENCY: ICD-10-CM

## 2024-01-15 DIAGNOSIS — E55.9 VITAMIN D DEFICIENCY: ICD-10-CM

## 2024-01-15 DIAGNOSIS — I70.212 ATHEROSCLEROSIS OF NATIVE ARTERY OF LEFT LOWER EXTREMITY WITH INTERMITTENT CLAUDICATION (CMS-HCC): ICD-10-CM

## 2024-01-15 DIAGNOSIS — F17.219 CIGARETTE NICOTINE DEPENDENCE WITH NICOTINE-INDUCED DISORDER: ICD-10-CM

## 2024-01-15 DIAGNOSIS — I10 PRIMARY HYPERTENSION: ICD-10-CM

## 2024-01-15 DIAGNOSIS — J42 CHRONIC BRONCHITIS, UNSPECIFIED CHRONIC BRONCHITIS TYPE (MULTI): ICD-10-CM

## 2024-01-15 DIAGNOSIS — J45.909 ASTHMA, UNSPECIFIED ASTHMA SEVERITY, UNSPECIFIED WHETHER COMPLICATED, UNSPECIFIED WHETHER PERSISTENT (HHS-HCC): ICD-10-CM

## 2024-01-15 RX ORDER — CLONIDINE HYDROCHLORIDE 0.1 MG/1
0.1 TABLET ORAL NIGHTLY
Qty: 90 TABLET | Refills: 1 | Status: SHIPPED | OUTPATIENT
Start: 2024-01-15 | End: 2024-06-04

## 2024-01-16 DIAGNOSIS — J45.909 ASTHMA, UNSPECIFIED ASTHMA SEVERITY, UNSPECIFIED WHETHER COMPLICATED, UNSPECIFIED WHETHER PERSISTENT (HHS-HCC): ICD-10-CM

## 2024-01-16 DIAGNOSIS — F17.219 CIGARETTE NICOTINE DEPENDENCE WITH NICOTINE-INDUCED DISORDER: ICD-10-CM

## 2024-01-16 DIAGNOSIS — I70.212 ATHEROSCLEROSIS OF NATIVE ARTERY OF LEFT LOWER EXTREMITY WITH INTERMITTENT CLAUDICATION (CMS-HCC): ICD-10-CM

## 2024-01-16 DIAGNOSIS — I10 PRIMARY HYPERTENSION: ICD-10-CM

## 2024-01-16 DIAGNOSIS — E55.9 VITAMIN D DEFICIENCY: ICD-10-CM

## 2024-01-16 DIAGNOSIS — E78.2 MIXED HYPERLIPIDEMIA: ICD-10-CM

## 2024-01-16 DIAGNOSIS — J42 CHRONIC BRONCHITIS, UNSPECIFIED CHRONIC BRONCHITIS TYPE (MULTI): ICD-10-CM

## 2024-01-16 DIAGNOSIS — E53.9 VITAMIN B DEFICIENCY: ICD-10-CM

## 2024-01-16 RX ORDER — AMLODIPINE BESYLATE 5 MG/1
5 TABLET ORAL DAILY
Qty: 90 TABLET | Refills: 1 | Status: SHIPPED | OUTPATIENT
Start: 2024-01-16

## 2024-01-16 RX ORDER — ATORVASTATIN CALCIUM 10 MG/1
TABLET, FILM COATED ORAL
Qty: 90 TABLET | Refills: 1 | Status: SHIPPED | OUTPATIENT
Start: 2024-01-16

## 2024-01-18 ENCOUNTER — TELEPHONE (OUTPATIENT)
Dept: PRIMARY CARE | Facility: CLINIC | Age: 74
End: 2024-01-18
Payer: MEDICARE

## 2024-01-18 DIAGNOSIS — R30.0 BURNING WITH URINATION: ICD-10-CM

## 2024-01-18 NOTE — TELEPHONE ENCOUNTER
Chief Complaint :    Symptoms: scrotum feels like its on fire that goes into penis worse when he sits down, burning and pain when urinating but feels like the area is on fire this morning all the time     Duration: 3 days     Treatments: cranberry juice     Patient Requesting:    Did the Patient have the covid Vaccine:    Pharmacy: [facility]     Covid Tested:

## 2024-01-19 ENCOUNTER — LAB (OUTPATIENT)
Dept: LAB | Facility: LAB | Age: 74
End: 2024-01-19
Payer: MEDICARE

## 2024-01-19 DIAGNOSIS — R30.0 BURNING WITH URINATION: ICD-10-CM

## 2024-01-19 LAB
APPEARANCE UR: ABNORMAL
BILIRUB UR STRIP.AUTO-MCNC: ABNORMAL MG/DL
CAOX CRY #/AREA UR COMP ASSIST: ABNORMAL /HPF
COLOR UR: ABNORMAL
GLUCOSE UR STRIP.AUTO-MCNC: NEGATIVE MG/DL
HYALINE CASTS #/AREA URNS AUTO: ABNORMAL /LPF
KETONES UR STRIP.AUTO-MCNC: ABNORMAL MG/DL
LEUKOCYTE ESTERASE UR QL STRIP.AUTO: ABNORMAL
MUCOUS THREADS #/AREA URNS AUTO: ABNORMAL /LPF
NITRITE UR QL STRIP.AUTO: NEGATIVE
PH UR STRIP.AUTO: 5 [PH]
PROT UR STRIP.AUTO-MCNC: ABNORMAL MG/DL
RBC # UR STRIP.AUTO: NEGATIVE /UL
RBC #/AREA URNS AUTO: ABNORMAL /HPF
SP GR UR STRIP.AUTO: 1.02
UROBILINOGEN UR STRIP.AUTO-MCNC: 4 MG/DL
WBC #/AREA URNS AUTO: ABNORMAL /HPF

## 2024-01-19 PROCEDURE — 81001 URINALYSIS AUTO W/SCOPE: CPT

## 2024-01-19 PROCEDURE — 87086 URINE CULTURE/COLONY COUNT: CPT

## 2024-01-20 LAB — BACTERIA UR CULT: NO GROWTH

## 2024-01-22 ENCOUNTER — TELEPHONE (OUTPATIENT)
Dept: PRIMARY CARE | Facility: CLINIC | Age: 74
End: 2024-01-22
Payer: MEDICARE

## 2024-01-22 DIAGNOSIS — R30.0 BURNING WITH URINATION: ICD-10-CM

## 2024-02-01 ENCOUNTER — OFFICE VISIT (OUTPATIENT)
Dept: VASCULAR SURGERY | Facility: CLINIC | Age: 74
End: 2024-02-01
Payer: MEDICARE

## 2024-02-01 ENCOUNTER — APPOINTMENT (OUTPATIENT)
Dept: VASCULAR SURGERY | Facility: CLINIC | Age: 74
End: 2024-02-01
Payer: MEDICARE

## 2024-02-01 VITALS
BODY MASS INDEX: 23.11 KG/M2 | SYSTOLIC BLOOD PRESSURE: 112 MMHG | DIASTOLIC BLOOD PRESSURE: 73 MMHG | WEIGHT: 152 LBS | HEART RATE: 76 BPM

## 2024-02-01 DIAGNOSIS — I73.9 PAD (PERIPHERAL ARTERY DISEASE) (CMS-HCC): Primary | ICD-10-CM

## 2024-02-01 PROCEDURE — 3078F DIAST BP <80 MM HG: CPT | Performed by: INTERNAL MEDICINE

## 2024-02-01 PROCEDURE — 1160F RVW MEDS BY RX/DR IN RCRD: CPT | Performed by: INTERNAL MEDICINE

## 2024-02-01 PROCEDURE — 99213 OFFICE O/P EST LOW 20 MIN: CPT | Performed by: INTERNAL MEDICINE

## 2024-02-01 PROCEDURE — 3074F SYST BP LT 130 MM HG: CPT | Performed by: INTERNAL MEDICINE

## 2024-02-01 PROCEDURE — 1159F MED LIST DOCD IN RCRD: CPT | Performed by: INTERNAL MEDICINE

## 2024-02-01 ASSESSMENT — ENCOUNTER SYMPTOMS
ENDOCRINE NEGATIVE: 1
ALLERGIC/IMMUNOLOGIC NEGATIVE: 1
HEMATOLOGIC/LYMPHATIC NEGATIVE: 1
GASTROINTESTINAL NEGATIVE: 1
NEUROLOGICAL NEGATIVE: 1
PSYCHIATRIC NEGATIVE: 1
CONSTITUTIONAL NEGATIVE: 1
RESPIRATORY NEGATIVE: 1
CARDIOVASCULAR NEGATIVE: 1
MUSCULOSKELETAL NEGATIVE: 1
EYES NEGATIVE: 1

## 2024-02-01 NOTE — PROGRESS NOTES
Subjective   Patient ID: Joe Hester is a 73 y.o. male who presents for No chief complaint on file..  HPI  73 year old male with a past medical history of asthma, COPD, HTN, lumbar radiculopathy. smoker and PAD s/p right to left femoral bypass in 2017 per Dr. Hill that presents to the office for a follow up for PAD. Overall, he is doing well.  He denies leg claudication, pain or swelling. He does complain of some peripheral neuropathy   He has no open sores or wounds noted on exam. He has strong pedal pulses bilaterally. He has good cap refill and warm skin on his feet. He continues to smoke a half a pack a day.     Vascular testing:  CTA with b/l runoff 1/23: Largely stable appearance of the arterial vascular system of the abdomen, pelvis, and bilateral lower extremities. Chronic occlusion  of the left external iliac artery with femoral-femoral bypass. Extensive atherosclerotic disease.     Arterial duplex 12/22: The right to left femoral-femoral bypass graft. The bypass is constructed of PTFE graft. A stenosis is noted at the proximal anastomosis.Dampened flow is noted distal to the stenosis.  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 Mural thrombus is seen within the aneurysm.  A complex area with mixed echos is noted around the left CFA.    PVR 12/22: Evidence of mild arterial occlusive disease in the right lower extremity at rest. Evidence of moderate arterial occlusive disease in the left lower extremity at rest.  PVR 3/22: There is evidence of mild disease at the femoral popliteal level. There is evidence of mild disease at the femoral popliteal level.  PVR Jan/22: Evidence of mild arterial occlusive disease in the right lower extremity at rest. Evidence of mild arterial occlusive disease in the left lower extremity at rest.  CT abd aorta with b/l runoff 1/2021: Mild-to-moderate right common iliac. Right external iliac artery stent is patent. The left external iliac and upstream left common  femoral arteries remain occluded. There is  patency of synthetic fem-fem bypass graft.    Vascular procedures:    Right to left fem bypass in 2017 per Dr. Hill     Review of Systems   Constitutional: Negative.    HENT: Negative.     Eyes: Negative.    Respiratory: Negative.     Cardiovascular: Negative.    Gastrointestinal: Negative.    Endocrine: Negative.    Genitourinary: Negative.    Musculoskeletal: Negative.    Skin: Negative.    Allergic/Immunologic: Negative.    Neurological: Negative.    Hematological: Negative.    Psychiatric/Behavioral: Negative.         Objective   Physical Exam  HENT:      Mouth/Throat:      Mouth: Mucous membranes are moist.   Eyes:      Pupils: Pupils are equal, round, and reactive to light.   Cardiovascular:      Rate and Rhythm: Normal rate.   Pulmonary:      Effort: Pulmonary effort is normal.   Abdominal:      General: Bowel sounds are normal.   Musculoskeletal:         General: Normal range of motion.      Cervical back: Normal range of motion.   Skin:     General: Skin is warm and dry.      Capillary Refill: Capillary refill takes less than 2 seconds.   Neurological:      General: No focal deficit present.      Mental Status: He is alert.   Psychiatric:         Mood and Affect: Mood normal.         Assessment/Plan   73 year old male with a past medical history of asthma, COPD, HTN, lumbar radiculopathy. smoker and PAD s/p right to left femoral bypass in 2017 per Dr. Hill that presents to the office for a follow up for PAD.       Plan:  Arterial duplex PVRs showed mild to moderate vascular insufficiency of both lower extremities  Arterial duplex would suggest possible inflow anastomotic stenosis on the right from his femorofemoral bypass  However symptoms sentient unchanged is having burning discomfort which does not account for his vascular issue he does have some distal aortic stenosis based on CTA  At this time continue regular walking   smoking cessation   Follow up in one  year  Would repeat a PVR, however, he states that he is doing well and does not want to have done at this time.          AMANDA Guillory-CNP 02/01/24 10:48 AM

## 2024-02-21 ENCOUNTER — TELEPHONE (OUTPATIENT)
Dept: PRIMARY CARE | Facility: CLINIC | Age: 74
End: 2024-02-21
Payer: MEDICARE

## 2024-02-21 DIAGNOSIS — J45.909 ASTHMA, UNSPECIFIED ASTHMA SEVERITY, UNSPECIFIED WHETHER COMPLICATED, UNSPECIFIED WHETHER PERSISTENT (HHS-HCC): ICD-10-CM

## 2024-02-21 RX ORDER — IPRATROPIUM BROMIDE AND ALBUTEROL SULFATE 2.5; .5 MG/3ML; MG/3ML
3 SOLUTION RESPIRATORY (INHALATION)
Qty: 225 ML | Refills: 1 | Status: SHIPPED | OUTPATIENT
Start: 2024-02-21

## 2024-02-21 NOTE — TELEPHONE ENCOUNTER
Rx Refill Request Telephone Encounter    Name:  Joe F Kacie  :  [id number]  Medication Name:  Ipratropium-albuterol  3 ml          Specific Pharmacy location:  [facility] [place]

## 2024-02-22 ENCOUNTER — OFFICE VISIT (OUTPATIENT)
Dept: UROLOGY | Facility: CLINIC | Age: 74
End: 2024-02-22
Payer: MEDICARE

## 2024-02-22 ENCOUNTER — APPOINTMENT (OUTPATIENT)
Dept: UROLOGY | Facility: CLINIC | Age: 74
End: 2024-02-22
Payer: MEDICARE

## 2024-02-22 DIAGNOSIS — N41.0 ACUTE PROSTATITIS: Primary | ICD-10-CM

## 2024-02-22 DIAGNOSIS — R30.0 BURNING WITH URINATION: ICD-10-CM

## 2024-02-22 DIAGNOSIS — N50.3 EPIDIDYMAL CYST: ICD-10-CM

## 2024-02-22 DIAGNOSIS — R10.2 PERINEAL PAIN IN MALE: ICD-10-CM

## 2024-02-22 LAB
POC BILIRUBIN, URINE: ABNORMAL
POC BLOOD, URINE: NEGATIVE
POC GLUCOSE, URINE: NEGATIVE MG/DL
POC KETONES, URINE: ABNORMAL MG/DL
POC LEUKOCYTES, URINE: NEGATIVE
POC NITRITE,URINE: NEGATIVE
POC PH, URINE: 5.5 PH
POC PROTEIN, URINE: ABNORMAL MG/DL
POC SPECIFIC GRAVITY, URINE: >=1.03
POC UROBILINOGEN, URINE: 1 EU/DL

## 2024-02-22 PROCEDURE — 99204 OFFICE O/P NEW MOD 45 MIN: CPT | Performed by: UROLOGY

## 2024-02-22 PROCEDURE — 1159F MED LIST DOCD IN RCRD: CPT | Performed by: UROLOGY

## 2024-02-22 PROCEDURE — 1160F RVW MEDS BY RX/DR IN RCRD: CPT | Performed by: UROLOGY

## 2024-02-22 PROCEDURE — 81003 URINALYSIS AUTO W/O SCOPE: CPT | Performed by: UROLOGY

## 2024-02-22 RX ORDER — CIPROFLOXACIN 500 MG/1
500 TABLET ORAL 2 TIMES DAILY
Qty: 42 TABLET | Refills: 0 | Status: SHIPPED | OUTPATIENT
Start: 2024-02-22 | End: 2024-03-14

## 2024-02-22 NOTE — PROGRESS NOTES
"02/22/2024  73-year-old gentleman presents 2 weeks peroneum pain dysuria.    Patient has no nausea, no vomiting, no fever.    Exam: Circumcised, normal penis, right epididymal cyst 1 cm, normal left testicle    REMIGIO: Mild enlarged prostate, tenderness moderate    We discussed the acute prostatitis, Cipro 500 mg twice a day for 3 weeks  We discussed benign prostate hypertrophy  We discussed cessation of PSA check due to the age  All the questions were answered, the patient expressed understanding and agreed to the plan.    Impression  Acute prostatitis  BPH  Dysuria    Plan  Cipro 500 mg twice a day for 3 weeks  Scrotal ultrasound for right epididymal lesion  Follow-up as needed    New Patient  Chief Complaint   Patient presents with    Chronic Pelvic Pain     Intermittent perineal pain when urinating and sitting for the past 2 months.     Benign Prostatic Hypertrophy     Nocturia 4x for the past year. Voiding well during the day.         Physical Exam     TODAYS LAB RESULTS:    PVR 25 mL    No results found for: \"PSA\"    Urine Culture 01/19/2024  negative    POC Glucose, Urine  NEGATIVE mg/dl NEGATIVE   POC Bilirubin, Urine  NEGATIVE MODERATE (2+) Abnormal    POC Ketones, Urine  NEGATIVE mg/dl 15 (1+) Abnormal    POC Specific Gravity, Urine  1.005 - 1.035 >=1.030   POC Blood, Urine  NEGATIVE NEGATIVE   POC PH, Urine  No Reference Range Established PH 5.5   POC Protein, Urine  NEGATIVE, 30 (1+) mg/dl 100 (2+) Abnormal    POC Urobilinogen, Urine  0.2, 1.0 EU/DL 1.0   Poc Nitrite, Urine  NEGATIVE NEGATIVE   POC Leukocytes, Urine  NEGATIVE NEGATIVE     ASSESSMENT&PLAN:      IMPRESSIONS:         "

## 2024-02-26 ENCOUNTER — HOSPITAL ENCOUNTER (OUTPATIENT)
Dept: RADIOLOGY | Facility: HOSPITAL | Age: 74
Discharge: HOME | End: 2024-02-26
Payer: MEDICARE

## 2024-02-26 DIAGNOSIS — N50.3 EPIDIDYMAL CYST: ICD-10-CM

## 2024-02-26 PROCEDURE — 76870 US EXAM SCROTUM: CPT

## 2024-02-26 PROCEDURE — 76870 US EXAM SCROTUM: CPT | Performed by: RADIOLOGY

## 2024-03-20 ENCOUNTER — OFFICE VISIT (OUTPATIENT)
Dept: PRIMARY CARE | Facility: CLINIC | Age: 74
End: 2024-03-20
Payer: MEDICARE

## 2024-03-20 VITALS
DIASTOLIC BLOOD PRESSURE: 62 MMHG | HEART RATE: 60 BPM | BODY MASS INDEX: 23.79 KG/M2 | WEIGHT: 157 LBS | HEIGHT: 68 IN | SYSTOLIC BLOOD PRESSURE: 94 MMHG

## 2024-03-20 DIAGNOSIS — E78.2 MIXED HYPERLIPIDEMIA: ICD-10-CM

## 2024-03-20 DIAGNOSIS — E55.9 VITAMIN D DEFICIENCY: ICD-10-CM

## 2024-03-20 DIAGNOSIS — F32.0 DEPRESSION, MAJOR, SINGLE EPISODE, MILD (CMS-HCC): ICD-10-CM

## 2024-03-20 DIAGNOSIS — J45.909 ASTHMA, UNSPECIFIED ASTHMA SEVERITY, UNSPECIFIED WHETHER COMPLICATED, UNSPECIFIED WHETHER PERSISTENT (HHS-HCC): ICD-10-CM

## 2024-03-20 DIAGNOSIS — B18.2 CHRONIC HEPATITIS C WITHOUT HEPATIC COMA (MULTI): ICD-10-CM

## 2024-03-20 DIAGNOSIS — Z00.00 MEDICARE ANNUAL WELLNESS VISIT, SUBSEQUENT: Primary | ICD-10-CM

## 2024-03-20 DIAGNOSIS — Z12.5 PROSTATE CANCER SCREENING: ICD-10-CM

## 2024-03-20 DIAGNOSIS — J42 CHRONIC BRONCHITIS, UNSPECIFIED CHRONIC BRONCHITIS TYPE (MULTI): ICD-10-CM

## 2024-03-20 DIAGNOSIS — F17.219 CIGARETTE NICOTINE DEPENDENCE WITH NICOTINE-INDUCED DISORDER: ICD-10-CM

## 2024-03-20 DIAGNOSIS — I70.212 ATHEROSCLEROSIS OF NATIVE ARTERY OF LEFT LOWER EXTREMITY WITH INTERMITTENT CLAUDICATION (CMS-HCC): ICD-10-CM

## 2024-03-20 DIAGNOSIS — I10 PRIMARY HYPERTENSION: ICD-10-CM

## 2024-03-20 PROCEDURE — 99214 OFFICE O/P EST MOD 30 MIN: CPT | Performed by: CLINICAL NURSE SPECIALIST

## 2024-03-20 PROCEDURE — 3074F SYST BP LT 130 MM HG: CPT | Performed by: CLINICAL NURSE SPECIALIST

## 2024-03-20 PROCEDURE — G0009 ADMIN PNEUMOCOCCAL VACCINE: HCPCS | Performed by: CLINICAL NURSE SPECIALIST

## 2024-03-20 PROCEDURE — 1159F MED LIST DOCD IN RCRD: CPT | Performed by: CLINICAL NURSE SPECIALIST

## 2024-03-20 PROCEDURE — 1124F ACP DISCUSS-NO DSCNMKR DOCD: CPT | Performed by: CLINICAL NURSE SPECIALIST

## 2024-03-20 PROCEDURE — 90677 PCV20 VACCINE IM: CPT | Performed by: CLINICAL NURSE SPECIALIST

## 2024-03-20 PROCEDURE — G0439 PPPS, SUBSEQ VISIT: HCPCS | Performed by: CLINICAL NURSE SPECIALIST

## 2024-03-20 PROCEDURE — 1170F FXNL STATUS ASSESSED: CPT | Performed by: CLINICAL NURSE SPECIALIST

## 2024-03-20 PROCEDURE — 1160F RVW MEDS BY RX/DR IN RCRD: CPT | Performed by: CLINICAL NURSE SPECIALIST

## 2024-03-20 PROCEDURE — 3078F DIAST BP <80 MM HG: CPT | Performed by: CLINICAL NURSE SPECIALIST

## 2024-03-20 ASSESSMENT — COLUMBIA-SUICIDE SEVERITY RATING SCALE - C-SSRS
2. HAVE YOU ACTUALLY HAD ANY THOUGHTS OF KILLING YOURSELF?: NO
1. IN THE PAST MONTH, HAVE YOU WISHED YOU WERE DEAD OR WISHED YOU COULD GO TO SLEEP AND NOT WAKE UP?: NO
6. HAVE YOU EVER DONE ANYTHING, STARTED TO DO ANYTHING, OR PREPARED TO DO ANYTHING TO END YOUR LIFE?: NO

## 2024-03-20 ASSESSMENT — ENCOUNTER SYMPTOMS
POLYDIPSIA: 0
CHEST TIGHTNESS: 0
ARTHRALGIAS: 1
ABDOMINAL PAIN: 0
APPETITE CHANGE: 0
SLEEP DISTURBANCE: 0
DIARRHEA: 0
CONFUSION: 0
FATIGUE: 0
DIZZINESS: 0
MYALGIAS: 0
WHEEZING: 0
HEADACHES: 0
TROUBLE SWALLOWING: 0
NAUSEA: 0
EYE PAIN: 0
SHORTNESS OF BREATH: 1
ACTIVITY CHANGE: 0
CHILLS: 0
COUGH: 0
LOSS OF SENSATION IN FEET: 0
JOINT SWELLING: 0
WOUND: 0
UNEXPECTED WEIGHT CHANGE: 0
HEMATURIA: 0
PALPITATIONS: 0
DYSURIA: 0
FEVER: 0
VOMITING: 0
FLANK PAIN: 0
BRUISES/BLEEDS EASILY: 0
OCCASIONAL FEELINGS OF UNSTEADINESS: 0
SORE THROAT: 0
NECK PAIN: 0
DEPRESSION: 0
BACK PAIN: 1
SEIZURES: 0
BLOOD IN STOOL: 0
CONSTIPATION: 0
PHOTOPHOBIA: 0

## 2024-03-20 ASSESSMENT — ACTIVITIES OF DAILY LIVING (ADL)
MANAGING_FINANCES: INDEPENDENT
GROCERY_SHOPPING: INDEPENDENT
DRESSING: INDEPENDENT
DOING_HOUSEWORK: INDEPENDENT
TAKING_MEDICATION: INDEPENDENT
BATHING: INDEPENDENT

## 2024-03-20 NOTE — PROGRESS NOTES
Subjective   Reason for Visit: Joe Hester is an 73 y.o. male here for a Medicare Wellness visit.     Past Medical, Surgical, and Family History reviewed and updated in chart.    Reviewed all medications by prescribing practitioner or clinical pharmacist (such as prescriptions, OTCs, herbal therapies and supplements) and documented in the medical record.    HPI    Here today for a follow up. Due for Medicare Wellness.      Previously seen with complaints of bilateral lower extremity discomfort. Following with Dr. Marte for Vascular. Referred to Dr. Bucio previously for spine evaluation of Neuropathic Pain, Bilateral lower extremities. Patient has Medical Marijuana.      Has been [facility] in [facility]. Patient states that he is down to 5 cigarettes per day, continuing to work on Cessation. Now following with Pulmonology. Chronic shortness of breath with exertion. COPD. Patient follows with Dr. Engel for Pulmonology. Inhalers adjusted. Due for a follow up appointment.      Lisinopril, Amlodipine, and Clonidine for his Hypertension, tolerating Rx. Has been on Suboxone, following with Counseling at well. Blood pressure has been better controlled.      Trouble with Anxiety and Stress. PTSD related. Did not tolerate medications in the past. Triggered by his history of Incarceration. Hopeful that Medical Marijuana will continue to provide him some benefit. Has noted some improvement with starting the medical marijuana.      Now following with [facility] for Suboxone. Following twice a month. Suboxone dosage has been the same.     Patient Care Team:  JAELYN Mejía as PCP - General (Internal Medicine)  [facility]  Abdulkadir Engel MD as Surgeon (Pulmonary Disease)     Review of Systems   Constitutional:  Negative for activity change, appetite change, chills, fatigue, fever and unexpected weight change.   HENT:  Negative for ear pain,  "hearing loss, nosebleeds, sore throat, tinnitus and trouble swallowing.    Eyes:  Negative for photophobia, pain and visual disturbance.   Respiratory:  Positive for shortness of breath. Negative for cough, chest tightness and wheezing.         Chronic with exertion.   Cardiovascular:  Negative for chest pain, palpitations and leg swelling.   Gastrointestinal:  Negative for abdominal pain, blood in stool, constipation, diarrhea, nausea and vomiting.   Endocrine: Negative for cold intolerance, heat intolerance, polydipsia and polyuria.   Genitourinary:  Negative for dysuria, flank pain and hematuria.   Musculoskeletal:  Positive for arthralgias and back pain. Negative for joint swelling, myalgias and neck pain.   Skin:  Negative for pallor, rash and wound.   Allergic/Immunologic: Negative for immunocompromised state.   Neurological:  Negative for dizziness, seizures and headaches.   Hematological:  Does not bruise/bleed easily.   Psychiatric/Behavioral:  Negative for confusion and sleep disturbance.        Objective   Vitals:  BP 82/53 (BP Location: Left arm, Patient Position: Sitting)   Pulse 60   Ht 1.727 m (5' 8\")   Wt 71.2 kg (157 lb)   BMI 23.87 kg/m²       Physical Exam  Vitals and nursing note reviewed.   Constitutional:       General: He is not in acute distress.     Appearance: Normal appearance.   HENT:      Head: Normocephalic.      Nose: Nose normal.   Eyes:      Conjunctiva/sclera: Conjunctivae normal.   Neck:      Vascular: No carotid bruit.   Cardiovascular:      Rate and Rhythm: Normal rate and regular rhythm.      Pulses: Normal pulses.      Heart sounds: Normal heart sounds.   Pulmonary:      Effort: Pulmonary effort is normal.      Breath sounds: Normal breath sounds.   Abdominal:      General: Bowel sounds are normal.      Palpations: Abdomen is soft.   Musculoskeletal:         General: Normal range of motion.      Cervical back: Normal range of motion.   Skin:     General: Skin is warm and " dry.   Neurological:      Mental Status: He is alert and oriented to person, place, and time. Mental status is at baseline.   Psychiatric:         Mood and Affect: Mood normal.         Behavior: Behavior normal.         Assessment/Plan   Problem List Items Addressed This Visit       Asthma    Atherosclerosis of native artery of left lower extremity with intermittent claudication (CMS/HCC)    Overview     Last impress: 2/25/22 - Chronic condition documentation - worsening based on symptoms and exam. Treatment plan established. Disease monitoring and precautions, any treatment changes, patient instructions, and follow-up are documented in encounter note.         Hyperlipidemia    Nicotine dependence    Hypertension    Vitamin D deficiency         New order for blood work provided at [facility] today.      #1. Hypertension: Blood pressure better controlled at [facility] today. Continue Lisinopril, Clonidine, Chlorthalidone, and Norvasc. DASH diet. Ambulatory blood pressure monitoring. Lab work ordered.   #2. COPD: Chronic shortness of breath with exertion. Following with Pulmonology. Due for a follow up appointment at this time.   #3. Anxiety and Depression: Did not tolerate Wellbutrin. States that he was previously on multiple medications. Prozac, Xanax, Trazodone and Zoloft. Unsure of the other medications. Discontinued Lexapro. Ativan and Klonopin PRN, did not tolerate. Valium PRN effective in the past. Unable to continue to prescribe due to inconsistencies with Drug Screen. Patient aware. Now following for Medical Marijuana.   #4. Tobacco Use: Patient advised to quit smoking tobacco. The risks of smoking were reviewed with the patient and he was offered medication and/or smoking cessation counseling to help. Did not tolerate Wellbutrin. Declined any other Rx. at this time. Currently smoking 5 cigarettes/day. LDCT ordered.   #5. Vitamin B12 Deficiency: Continue Vitamin B12 OTC. Reevaluate with next blood work.  #6. Vitamin D  Deficiency: Reevaluate with next blood work.  #7. Hepatitis C: Completed treatment through GI previously years ago. Recent testing indicated Viral Load not detected.   #8. 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 Stenosis of Iliac Artery, PVD: Following with Dr. Marte for management/evaluation. Continue Plavix. Encouraged smoking cessation.   #10. Hyperlipidemia: Lipitor. Lifestyle changes. Reevaluate with next lab work.   #11. Opioid Dependence: Following with Specialist for management with Suboxone.   #12. Medicare Wellness: Routine and age appropriate recommendations discussed with the patient today and patient verbalized understanding of the recommendations.  Questions answered.  Age appropriate immunizations and preventative screenings discussed with the patient and ordered as appropriate. Labs updated and ordered as indicated. Recommend healthy diet and daily exercise to maintain healthy body weight.      PSA Exam: May 2017. To be done with next lab work.   Colonoscopy: January 2016. Recommended to repeat in 10 years.   Medicare Wellness: March 2024.   Prevnar 20: March 2024.   COVID Vaccine: March/November 2021. Discussed Booster.  Flu Vaccine: September 2023.

## 2024-04-18 DIAGNOSIS — I73.9 PERIPHERAL VASCULAR DISEASE (CMS-HCC): Primary | ICD-10-CM

## 2024-04-22 RX ORDER — CLOPIDOGREL BISULFATE 75 MG/1
75 TABLET ORAL DAILY
Qty: 90 TABLET | Refills: 3 | Status: SHIPPED | OUTPATIENT
Start: 2024-04-22

## 2024-05-22 ENCOUNTER — TELEPHONE (OUTPATIENT)
Dept: PRIMARY CARE | Facility: CLINIC | Age: 74
End: 2024-05-22
Payer: MEDICARE

## 2024-05-22 NOTE — TELEPHONE ENCOUNTER
We can place that on the lab orders that we write, otherwise I am not sure that Lab would have any way of seeing it. He would need to tell each provider who orders lab work for him.

## 2024-05-22 NOTE — TELEPHONE ENCOUNTER
Patient said that he went to get  cataracts surgery yesterday, and they attempted to draw blood 12 times before the ultrasound was brought out,  he is asking if you can put something in his chart that when it is difficult to get a blood draw and that they need to use an ultrasound?

## 2024-06-04 DIAGNOSIS — J45.909 ASTHMA, UNSPECIFIED ASTHMA SEVERITY, UNSPECIFIED WHETHER COMPLICATED, UNSPECIFIED WHETHER PERSISTENT (HHS-HCC): ICD-10-CM

## 2024-06-04 DIAGNOSIS — I10 PRIMARY HYPERTENSION: ICD-10-CM

## 2024-06-04 DIAGNOSIS — E53.9 VITAMIN B DEFICIENCY: ICD-10-CM

## 2024-06-04 DIAGNOSIS — I70.212 ATHEROSCLEROSIS OF NATIVE ARTERY OF LEFT LOWER EXTREMITY WITH INTERMITTENT CLAUDICATION (CMS-HCC): ICD-10-CM

## 2024-06-04 DIAGNOSIS — E55.9 VITAMIN D DEFICIENCY: ICD-10-CM

## 2024-06-04 DIAGNOSIS — F17.219 CIGARETTE NICOTINE DEPENDENCE WITH NICOTINE-INDUCED DISORDER: ICD-10-CM

## 2024-06-04 DIAGNOSIS — J42 CHRONIC BRONCHITIS, UNSPECIFIED CHRONIC BRONCHITIS TYPE (MULTI): ICD-10-CM

## 2024-06-04 DIAGNOSIS — E78.2 MIXED HYPERLIPIDEMIA: ICD-10-CM

## 2024-06-04 RX ORDER — CLONIDINE HYDROCHLORIDE 0.1 MG/1
0.1 TABLET ORAL NIGHTLY
Qty: 90 TABLET | Refills: 1 | Status: SHIPPED | OUTPATIENT
Start: 2024-06-04

## 2024-06-21 ENCOUNTER — HOSPITAL ENCOUNTER (OUTPATIENT)
Dept: RADIOLOGY | Facility: HOSPITAL | Age: 74
Discharge: HOME | End: 2024-06-21
Payer: MEDICARE

## 2024-06-21 DIAGNOSIS — J45.909 ASTHMA, UNSPECIFIED ASTHMA SEVERITY, UNSPECIFIED WHETHER COMPLICATED, UNSPECIFIED WHETHER PERSISTENT (HHS-HCC): ICD-10-CM

## 2024-06-21 DIAGNOSIS — E78.2 MIXED HYPERLIPIDEMIA: ICD-10-CM

## 2024-06-21 DIAGNOSIS — F17.219 CIGARETTE NICOTINE DEPENDENCE WITH NICOTINE-INDUCED DISORDER: ICD-10-CM

## 2024-06-21 DIAGNOSIS — I70.212 ATHEROSCLEROSIS OF NATIVE ARTERY OF LEFT LOWER EXTREMITY WITH INTERMITTENT CLAUDICATION (CMS-HCC): ICD-10-CM

## 2024-06-21 DIAGNOSIS — I10 PRIMARY HYPERTENSION: ICD-10-CM

## 2024-06-21 DIAGNOSIS — E55.9 VITAMIN D DEFICIENCY: ICD-10-CM

## 2024-06-21 DIAGNOSIS — Z00.00 MEDICARE ANNUAL WELLNESS VISIT, SUBSEQUENT: ICD-10-CM

## 2024-06-21 PROCEDURE — 71271 CT THORAX LUNG CANCER SCR C-: CPT

## 2024-06-24 ENCOUNTER — TELEPHONE (OUTPATIENT)
Dept: PRIMARY CARE | Facility: CLINIC | Age: 74
End: 2024-06-24
Payer: MEDICARE

## 2024-06-24 NOTE — TELEPHONE ENCOUNTER
----- Message from AMANDA Mejía-CNS sent at 6/24/2024  7:47 AM EDT -----  Please let patient know that his CT is stable. Thank you!

## 2024-07-08 ENCOUNTER — TELEPHONE (OUTPATIENT)
Dept: PRIMARY CARE | Facility: CLINIC | Age: 74
End: 2024-07-08
Payer: MEDICARE

## 2024-07-08 DIAGNOSIS — E55.9 VITAMIN D DEFICIENCY: ICD-10-CM

## 2024-07-08 DIAGNOSIS — F17.219 CIGARETTE NICOTINE DEPENDENCE WITH NICOTINE-INDUCED DISORDER: ICD-10-CM

## 2024-07-08 DIAGNOSIS — I10 PRIMARY HYPERTENSION: ICD-10-CM

## 2024-07-08 DIAGNOSIS — J42 CHRONIC BRONCHITIS, UNSPECIFIED CHRONIC BRONCHITIS TYPE (MULTI): ICD-10-CM

## 2024-07-08 DIAGNOSIS — J45.909 ASTHMA, UNSPECIFIED ASTHMA SEVERITY, UNSPECIFIED WHETHER COMPLICATED, UNSPECIFIED WHETHER PERSISTENT (HHS-HCC): ICD-10-CM

## 2024-07-08 DIAGNOSIS — I70.212 ATHEROSCLEROSIS OF NATIVE ARTERY OF LEFT LOWER EXTREMITY WITH INTERMITTENT CLAUDICATION (CMS-HCC): ICD-10-CM

## 2024-07-08 DIAGNOSIS — E53.9 VITAMIN B DEFICIENCY: ICD-10-CM

## 2024-07-08 DIAGNOSIS — E78.2 MIXED HYPERLIPIDEMIA: ICD-10-CM

## 2024-07-08 RX ORDER — LISINOPRIL 40 MG/1
40 TABLET ORAL DAILY
Qty: 90 TABLET | Refills: 3 | Status: SHIPPED | OUTPATIENT
Start: 2024-07-08

## 2024-07-17 ENCOUNTER — TELEPHONE (OUTPATIENT)
Dept: VASCULAR SURGERY | Facility: CLINIC | Age: 74
End: 2024-07-17
Payer: MEDICARE

## 2024-07-17 NOTE — TELEPHONE ENCOUNTER
Patient called today stating that he is having increased pain in his legs. He was last seen by Autumn in February he is requesting testing and to follow up as he denied the PVR at last visit,     Autumn's last note states...   Arterial duplex PVRs showed mild to moderate vascular insufficiency of both lower extremities  Arterial duplex would suggest possible inflow anastomotic stenosis on the right from his femorofemoral bypass  However symptoms sentient unchanged is having burning discomfort which does not account for his vascular issue he does have some distal aortic stenosis based on CTA  At this time continue regular walking   smoking cessation   Follow up in one year  Would repeat a PVR, however, he states that he is doing well and does not want to have done at this time.

## 2024-07-19 DIAGNOSIS — I74.09 AORTOILIAC OCCLUSIVE DISEASE (MULTI): Primary | ICD-10-CM

## 2024-08-02 ENCOUNTER — HOSPITAL ENCOUNTER (OUTPATIENT)
Dept: RADIOLOGY | Facility: HOSPITAL | Age: 74
Discharge: HOME | End: 2024-08-02
Payer: MEDICARE

## 2024-08-02 DIAGNOSIS — I74.09 AORTOILIAC OCCLUSIVE DISEASE (MULTI): ICD-10-CM

## 2024-08-02 LAB
CREAT SERPL-MCNC: 1.3 MG/DL (ref 0.6–1.3)
GFR SERPL CREATININE-BSD FRML MDRD: 58 ML/MIN/1.73M*2

## 2024-08-02 PROCEDURE — 2550000001 HC RX 255 CONTRASTS: Performed by: SURGERY

## 2024-08-02 PROCEDURE — 75635 CT ANGIO ABDOMINAL ARTERIES: CPT

## 2024-08-02 PROCEDURE — 82565 ASSAY OF CREATININE: CPT

## 2024-08-05 ENCOUNTER — TELEPHONE (OUTPATIENT)
Dept: VASCULAR SURGERY | Facility: HOSPITAL | Age: 74
End: 2024-08-05
Payer: MEDICARE

## 2024-08-05 NOTE — TELEPHONE ENCOUNTER
Calling for CT angio aorta results completed 8/2/24:     IMPRESSION:  Status post fem-fem bypass graft. Majority of the bypass graft is  patent. However, moderate to high-grade atherosclerotic narrowing  immediately at the anastomosis of the fem-fem bypass graft.  There is  partial thrombosis of the graft at its anastomosis with the left  common femoral artery with resultant 85% narrowing, increased in  extent since prior examination 07/03/2018 and 01/17/2023. The native  left external iliac artery is again totally occluded.

## 2024-08-15 ENCOUNTER — OFFICE VISIT (OUTPATIENT)
Dept: VASCULAR SURGERY | Facility: HOSPITAL | Age: 74
End: 2024-08-15
Payer: MEDICARE

## 2024-08-15 VITALS
HEART RATE: 64 BPM | SYSTOLIC BLOOD PRESSURE: 159 MMHG | BODY MASS INDEX: 22.66 KG/M2 | WEIGHT: 149 LBS | DIASTOLIC BLOOD PRESSURE: 95 MMHG

## 2024-08-15 DIAGNOSIS — I73.9 PAD (PERIPHERAL ARTERY DISEASE) (CMS-HCC): Primary | ICD-10-CM

## 2024-08-15 NOTE — PROGRESS NOTES
Subjective   Patient ID: Joe Hester is a 73 y.o. male who presents for Follow-up (Results /PAD ).  HPI  Patient at this time continues to have short distance less than 100 claudication bilateral lower extremities left slightly worse than right.  No active ulcer sores he is walking with a cane for stability  No evidence of rest pain noted  Motor function appears to be intact per patient.    Review of Systems  Review of Systems    Constitutional:  no generalized malaise overall feels well, energy levels intact, no complaints specifically noted  HEENT:  No blurry vision, no visual aides noted, no hearing loss no ear ache no nose bleeds noted, no dysphagia, no congestion otherwise no pertinent positives noted  Cardiovascular:  no palpitations, chest pain or heaviness noted, no leg swelling, no numbness or tingling in the lower extremity noted  Respiratory:  no shortness of breath, no productive cough noted, no conversation dyspnea or difficulty breathing  Gastrointestinal:  no abdominal pain, no nausea or vomiting, appetite intact, no bowel irregularities noted  Genitourinary:   no urinary incontinence, frequency or urgency issues noted, no hematuria or burning sensation issues  Musculoskeletal:  No muscle aches or pains, no joint discomfort noted, no back pain noted otherwise feels well  Skin: no ulcerations, skin color issues or wounds upper or lower extremities  Neurologic: no dizziness, no hemiplegia, no hemiparesis, no obvious visual deficits noted  Psychiatric: no depression, no memory loss noted, no suicidal ideation  Endocrine: no weight loss or gain, no temperature concerns hot or cold intolerance  Hemogolotic/Lymphatic: no bruising, excessive bleeding, no swelling in the groins or neck noted    Objective   Physical Exam  Physical exam    Constitutional: alert and in no acute distress verbal  Eyes: No erythema swelling or discharge noted  Neck: supple, symmetric, trachea midline, no masses  noted  Cardiovascular: Carotid pulses 2+, no obvious bruit, no Jugular distension noted, no thrill, heart regular rate, lower extremity vascular exam intact, cap refill <2 sec  Pulmonary:  Bilateral breath sounds intact, clear with rales rhonchi or wheeze  Abdomen: soft non tender, no pulsatile masses noted, no rebound rigidity or guarding noted  Skin: intact warm no abnormal turgor  Psychiatric: alert without any obvious cognitive issues, oriented to person, place, and time    Assessment/Plan   Peripheral arterial disease  CTA reviewed demonstrating femorofemoral anastomotic sites to be stenotic  There is some moderate iliac occlusive disease on the right  Left iliac is occluded  We discussed risk benefits complications of angiogram with possible intervention femorofemoral angioplasty was as well as possible iliac angioplasty  All questions were addressed patient does understand wishes to proceed  Complication not limited to infection bleeding arteriovenous injury embolic phenomena thrombosis dissection rupture all questions were addressed.         Marvin Marte DO 08/15/24 2:53 PM

## 2024-08-29 ENCOUNTER — TELEPHONE (OUTPATIENT)
Dept: VASCULAR SURGERY | Facility: HOSPITAL | Age: 74
End: 2024-08-29
Payer: MEDICARE

## 2024-08-29 ENCOUNTER — PATIENT MESSAGE (OUTPATIENT)
Dept: VASCULAR SURGERY | Facility: HOSPITAL | Age: 74
End: 2024-08-29
Payer: MEDICARE

## 2024-08-29 NOTE — TELEPHONE ENCOUNTER
Procedure 9/20/24  FUV 10/2/24 1PM   Pre op through mychart     ----- Message from Marvin Marte sent at 8/27/2024  2:35 PM EDT -----  Regarding: RE: 9/20/24 angiogram w/ poss intervention femorofemoral angioplasty as well as possible iliac angioplasty  opened  ----- Message -----  From: Stacy Moore LPN  Sent: 8/21/2024   2:28 PM EDT  To: Marvin Marte, DO; Stacy Moore LPN  Subject: 9/20/24 angiogram w/ poss intervention femor#    Please start case for 9/20/24    No clearance needed

## 2024-09-05 ENCOUNTER — TELEPHONE (OUTPATIENT)
Dept: VASCULAR SURGERY | Facility: HOSPITAL | Age: 74
End: 2024-09-05
Payer: MEDICARE

## 2024-09-09 ENCOUNTER — PRE-ADMISSION TESTING (OUTPATIENT)
Dept: PREADMISSION TESTING | Facility: HOSPITAL | Age: 74
End: 2024-09-09
Payer: MEDICARE

## 2024-09-09 ENCOUNTER — HOSPITAL ENCOUNTER (OUTPATIENT)
Dept: CARDIOLOGY | Facility: HOSPITAL | Age: 74
Discharge: HOME | End: 2024-09-09
Payer: MEDICARE

## 2024-09-09 VITALS
RESPIRATION RATE: 22 BRPM | TEMPERATURE: 97.4 F | BODY MASS INDEX: 24.17 KG/M2 | HEIGHT: 67 IN | HEART RATE: 70 BPM | WEIGHT: 154 LBS | SYSTOLIC BLOOD PRESSURE: 116 MMHG | OXYGEN SATURATION: 96 % | DIASTOLIC BLOOD PRESSURE: 60 MMHG

## 2024-09-09 DIAGNOSIS — I10 PRIMARY HYPERTENSION: ICD-10-CM

## 2024-09-09 DIAGNOSIS — I73.9 PERIPHERAL VASCULAR DISEASE (CMS-HCC): ICD-10-CM

## 2024-09-09 DIAGNOSIS — J44.9 CHRONIC OBSTRUCTIVE PULMONARY DISEASE, UNSPECIFIED COPD TYPE (MULTI): ICD-10-CM

## 2024-09-09 DIAGNOSIS — Z01.818 PRE-OP EVALUATION: Primary | ICD-10-CM

## 2024-09-09 DIAGNOSIS — I70.212 ATHEROSCLEROSIS OF NATIVE ARTERY OF LEFT LOWER EXTREMITY WITH INTERMITTENT CLAUDICATION (CMS-HCC): ICD-10-CM

## 2024-09-09 DIAGNOSIS — Z01.818 PRE-OP EVALUATION: ICD-10-CM

## 2024-09-09 DIAGNOSIS — J42 CHRONIC BRONCHITIS, UNSPECIFIED CHRONIC BRONCHITIS TYPE (MULTI): ICD-10-CM

## 2024-09-09 LAB
ATRIAL RATE: 60 BPM
P AXIS: 73 DEGREES
PR INTERVAL: 200 MS
Q ONSET: 252 MS
QRS COUNT: 10 BEATS
QRS DURATION: 144 MS
QT INTERVAL: 443 MS
QTC CALCULATION(BAZETT): 443 MS
QTC FREDERICIA: 443 MS
R AXIS: 96 DEGREES
T AXIS: 48 DEGREES
T OFFSET: 473 MS
VENTRICULAR RATE: 60 BPM

## 2024-09-09 PROCEDURE — 93005 ELECTROCARDIOGRAM TRACING: CPT

## 2024-09-09 PROCEDURE — 93010 ELECTROCARDIOGRAM REPORT: CPT | Performed by: INTERNAL MEDICINE

## 2024-09-09 ASSESSMENT — ENCOUNTER SYMPTOMS
PSYCHIATRIC NEGATIVE: 1
CONSTITUTIONAL NEGATIVE: 1
ARTHRALGIAS: 1
EYES NEGATIVE: 1
GASTROINTESTINAL NEGATIVE: 1
RESPIRATORY NEGATIVE: 1
ALLERGIC/IMMUNOLOGIC NEGATIVE: 1
CARDIOVASCULAR NEGATIVE: 1
HEMATOLOGIC/LYMPHATIC NEGATIVE: 1
ENDOCRINE NEGATIVE: 1
MYALGIAS: 1

## 2024-09-09 ASSESSMENT — LIFESTYLE VARIABLES: SMOKING_STATUS: SMOKER

## 2024-09-09 NOTE — H&P (VIEW-ONLY)
History Of Present Illness  Joe Hester is a 73 y.o. male presenting for Scheduled bilateral angiograms with lower extremity femorofemoral angioplasty, possible left Iliac angioplasty under general anesthesia per Dr. Marte on 9/20/24. PVD. Patient has bilateral lower leg pain with a 'constant burning'. Scans shows right side moderately occluded and left fully occluded.      Past Medical History  Past Medical History:   Diagnosis Date    COPD (chronic obstructive pulmonary disease) (Multi)     Easy bruising     Full dentures     HL (hearing loss)     left ear    Hyperlipidemia     Hypertension     Opioid dependence, in remission (Multi) 11/21/2014    Opioid type dependence in remission    Personal history of other diseases of the circulatory system 01/21/2020    History of intermittent claudication    Personal history of other mental and behavioral disorders     History of depression    PTSD (post-traumatic stress disorder)     needle phobia    Shortness of breath     Vision loss        Surgical History  Past Surgical History:   Procedure Laterality Date    CATARACT EXTRACTION W/  INTRAOCULAR LENS IMPLANT Bilateral     CHOLECYSTECTOMY  09/21/2017    Cholecystectomy    CT AORTA AND BILATERAL ILIOFEMORAL RUNOFF ANGIOGRAM W AND/OR WO IV CONTRAST  01/29/2021    CT AORTA AND BILATERAL ILIOFEMORAL RUNOFF ANGIOGRAM W AND/OR WO IV CONTRAST 1/29/2021 [facility]    CT AORTA AND BILATERAL ILIOFEMORAL RUNOFF ANGIOGRAM W AND/OR WO IV CONTRAST  01/17/2023    CT AORTA AND BILATERAL ILIOFEMORAL RUNOFF ANGIOGRAM W AND/OR WO IV CONTRAST 1/17/2023 [facility]    OTHER SURGICAL HISTORY  01/21/2020    Leg surgery    OTHER SURGICAL HISTORY  10/21/2014    Surgery    OTHER SURGICAL HISTORY  07/13/2020    Angioplasty        Social History  He reports that he has been smoking cigarettes. He has a 14.8 pack-year smoking history. He has never used smokeless tobacco. He reports current drug use. Drug: Marijuana. He reports  that he does not drink alcohol.    Family History  Family History   Problem Relation Name Age of Onset    Emphysema Father          Allergies  Gabapentin and Montelukast    Review of Systems   Constitutional: Negative.    HENT: Negative.     Eyes: Negative.    Respiratory: Negative.     Cardiovascular: Negative.         Dwight leg pain, PVD. 'burning'    Gastrointestinal: Negative.    Endocrine: Negative.    Genitourinary: Negative.    Musculoskeletal:  Positive for arthralgias, gait problem and myalgias.   Skin: Negative.    Allergic/Immunologic: Negative.    Hematological: Negative.    Psychiatric/Behavioral: Negative.     All other systems reviewed and are negative.       Physical Exam  Vitals and nursing note reviewed.   Constitutional:       Appearance: Normal appearance.   HENT:      Head: Normocephalic.      Nose: Nose normal.      Mouth/Throat:      Mouth: Mucous membranes are moist.      Pharynx: Oropharynx is clear.      Comments:   Mallampati: 1  TMD: >3  Finger breadth: 2  Dentition:  full set dentures   Neck ROM: full   Eyes:      Pupils: Pupils are equal, round, and reactive to light.   Cardiovascular:      Rate and Rhythm: Normal rate and regular rhythm.      Heart sounds: Normal heart sounds, S1 normal and S2 normal.      Comments: PVD, dwight lower leg pain  Pulmonary:      Effort: Pulmonary effort is normal.      Breath sounds: Normal breath sounds. Decreased air movement present.      Comments: Lungs clear but diminished throughout with overall decreased excursion.   Abdominal:      General: Bowel sounds are normal.      Palpations: Abdomen is soft.      Comments: Bowel sounds active x4   Musculoskeletal:         General: Normal range of motion.      Cervical back: Normal range of motion and neck supple.      Right lower leg: No edema.      Left lower leg: No edema.   Skin:     General: Skin is warm and dry.   Neurological:      General: No focal deficit present.      Mental Status: He is alert and  "oriented to person, place, and time.   Psychiatric:         Mood and Affect: Mood is anxious.         Behavior: Behavior normal.         Thought Content: Thought content normal.         Judgment: Judgment normal.          Last Recorded Vitals  Blood pressure 116/60, pulse 70, temperature 36.3 °C (97.4 °F), temperature source Oral, resp. rate 22, height 1.702 m (5' 7\"), weight 69.9 kg (154 lb), SpO2 96%.    Visit Vitals  /60   Pulse 70   Temp 36.3 °C (97.4 °F) (Oral)   Resp 22       DASI Risk Score    No data to display       Caprini DVT Assessment      Flowsheet Row Most Recent Value   DVT Score 9   Current Status Major surgery planned, lasting 2-3 hours, Major surgery planned, lasting over 3 hours, COPD   History Prior major surgery, COPD   Age 60-75 years   BMI 30 or less          Modified Frailty Index    No data to display       CHADS2 Stroke Risk  Current as of 31 minutes ago        N/A 3 to 100%: High Risk   2 to < 3%: Medium Risk   0 to < 2%: Low Risk     Last Change: N/A          This score determines the patient's risk of having a stroke if the patient has atrial fibrillation.        This score is not applicable to this patient. Components are not calculated.          Revised Cardiac Risk Index      Flowsheet Row Most Recent Value   Revised Cardiac Risk Calculator 2          Apfel Simplified Score      Flowsheet Row Most Recent Value   Apfel Simplified Score Calculator 1          Risk Analysis Index Results This Encounter    No data found in the last 1 encounters.       Stop Bang Score      Flowsheet Row Most Recent Value   Do you snore loudly? 0   Do you often feel tired or fatigued after your sleep? 0   Has anyone ever observed you stop breathing in your sleep? 0   Do you have or are you being treated for high blood pressure? 1   Recent BMI (Calculated) 23.9   Is BMI greater than 35 kg/m2? 0=No   Age older than 50 years old? 1=Yes   Is your neck circumference greater than 17 inches (Male) or 16 " inches (Female)? 0   Gender - Male 1=Yes   STOP-BANG Total Score 3          Current Outpatient Medications on File Prior to Visit   Medication Sig Dispense Refill    albuterol 90 mcg/actuation inhaler Inhale 1-2 puffs every 4 hours if needed for shortness of breath. 18 g 2    amLODIPine (Norvasc) 5 mg tablet TAKE 1 TABLET BY MOUTH ONCE DAILY 90 tablet 1    atorvastatin (Lipitor) 10 mg tablet TAKE 1 TABLET BY MOUTH ONCE DAILY NIGHTLY AT BEDTIME 90 tablet 1    buprenorphine-naloxone (Suboxone) 8-2 mg SL film Place 1 Film under the tongue 2 times a day.      chlorthalidone (Hygroton) 25 mg tablet Take 1 tablet (25 mg) by mouth once daily. 30 tablet 2    cloNIDine (Catapres) 0.1 mg tablet TAKE 1 TABLET BY MOUTH AT BEDTIME (Patient taking differently: Take 1 tablet (0.1 mg) by mouth once daily.) 90 tablet 1    clopidogrel (Plavix) 75 mg tablet Take 1 tablet (75 mg) by mouth once daily. 90 tablet 3    gabapentin (Neurontin) 300 mg capsule Take 1 capsule (300 mg) by mouth once daily at bedtime. 30 capsule 2    ipratropium-albuteroL (Duo-Neb) 0.5-2.5 mg/3 mL nebulizer solution Take 3 mL by nebulization 3 times a day. 1 unit dose (Patient taking differently: Take 3 mL by nebulization 3 times a day. 1 unit dose   uses once daily if needed) 225 mL 1    lisinopril 40 mg tablet Take 1 tablet (40 mg) by mouth once daily. 90 tablet 3    naloxone (Narcan) 4 mg/0.1 mL nasal spray Administer 1 spray (4 mg) into affected nostril(s). For opioid overdose      tiotropium-olodateroL (Stiolto Respimat) 2.5-2.5 mcg/actuation mist inhaler Inhale 2 Inhalations once daily.       No current facility-administered medications on file prior to visit.      Relevant Results  No results found for this or any previous visit (from the past 24 hour(s)).            Assessment/Plan   Problem List Items Addressed This Visit       Atherosclerosis of native artery of left lower extremity with intermittent claudication (CMS-HCC)    Relevant Orders    ECG 12  Lead    COPD (chronic obstructive pulmonary disease) (Multi)    Relevant Orders    ECG 12 Lead    Hypertension    Relevant Orders    ECG 12 Lead    Peripheral vascular disease (CMS-HCC)    Relevant Orders    Basic Metabolic Panel    CBC    ECG 12 Lead     Other Visit Diagnoses       Pre-op evaluation    -  Primary    Relevant Orders    Basic Metabolic Panel    CBC    ECG 12 Lead            Scheduled bilateral angiograms with lower extremity femorofemoral angioplasty, possible left Iliac angioplasty under general anesthesia per Dr. Marte on 9/20/24.   CBC, BMP ordered. Patient declined to have labs done today.   EKG ordered. Shows SR. RBBB, LPFB. RBBB also noted on 2017 EKG.   Patient's las dose of Plavix will be on 9/18/24.   Patient will plan on continuing his suboxone but not taking it the morning of surgery.   On DOS, take your lisinopril, clonidine, atorvastatin, amlodipine, and chlorthalidone with a small sip of water.   All surgery instructions reviewed with patient by RN. Verbalized understanding.        I spent 30 minutes in the professional and overall care of this patient.      Florencia Camacho, APRN-CNS

## 2024-09-09 NOTE — PREPROCEDURE INSTRUCTIONS
Medication List            Accurate as of September 9, 2024  7:56 AM. Always use your most recent med list.                albuterol 90 mcg/actuation inhaler  Inhale 1-2 puffs every 4 hours if needed for shortness of breath.     amLODIPine 5 mg tablet  Commonly known as: Norvasc  TAKE 1 TABLET BY MOUTH ONCE DAILY     atorvastatin 10 mg tablet  Commonly known as: Lipitor  TAKE 1 TABLET BY MOUTH ONCE DAILY NIGHTLY AT BEDTIME     buprenorphine-naloxone 8-2 mg SL film  Commonly known as: Suboxone     chlorthalidone 25 mg tablet  Commonly known as: [place]groton  Take 1 tablet (25 mg) by mouth once daily.     cloNIDine 0.1 mg tablet  Commonly known as: Catapres  TAKE 1 TABLET BY MOUTH AT BEDTIME     clopidogrel 75 mg tablet  Commonly known as: Plavix  Take 1 tablet (75 mg) by mouth once daily.     gabapentin 300 mg capsule  Commonly known as: Neurontin  Take 1 capsule (300 mg) by mouth once daily at bedtime.     ipratropium-albuteroL 0.5-2.5 mg/3 mL nebulizer solution  Commonly known as: Duo-Neb  Take 3 mL by nebulization 3 times a day. 1 unit dose     lisinopril 40 mg tablet  Take 1 tablet (40 mg) by mouth once daily.     naloxone 4 mg/0.1 mL nasal spray  Commonly known as: Narcan     Stiolto Respimat 2.5-2.5 mcg/actuation mist inhaler  Generic drug: tiotropium-olodateroL                              NPO Instructions:  Nothing to eat or drink after midnight  Morning of procedure take your lisinopril, clonidine, atorvastatin, amlodipine. And chlorthalidone with a small sip of water  Last dose plavix 9/18/2024  Hydrate well the day before  Suboxone do not take morning of procedure          Additional Instructions:     Wear  comfortable loose fitting clothing  Do not use moisturizers, creams, lotions or perfume  All jewelry and valuables should be left at home  Shower morning of deodorant only   Call with any questions [phone]

## 2024-09-10 ENCOUNTER — TELEPHONE (OUTPATIENT)
Dept: PRIMARY CARE | Facility: CLINIC | Age: 74
End: 2024-09-10
Payer: MEDICARE

## 2024-09-10 DIAGNOSIS — M25.512 ACUTE PAIN OF LEFT SHOULDER: ICD-10-CM

## 2024-09-10 NOTE — TELEPHONE ENCOUNTER
Dee does not do injection patient notified. Patient asking for a referral for ortho for a shoulder injection. Also apt albino

## 2024-09-10 NOTE — TELEPHONE ENCOUNTER
He called to see if he could get a cortisone shot for his left shoulder and he is schedule to see you on 9/20 but now he will need a new apt cause he is having surgery that day please advise and call him at [phone]

## 2024-09-18 ENCOUNTER — ANESTHESIA EVENT (OUTPATIENT)
Dept: OPERATING ROOM | Facility: HOSPITAL | Age: 74
End: 2024-09-18
Payer: MEDICARE

## 2024-09-20 ENCOUNTER — APPOINTMENT (OUTPATIENT)
Dept: RADIOLOGY | Facility: HOSPITAL | Age: 74
End: 2024-09-20
Payer: MEDICARE

## 2024-09-20 ENCOUNTER — ANESTHESIA (OUTPATIENT)
Dept: OPERATING ROOM | Facility: HOSPITAL | Age: 74
End: 2024-09-20
Payer: MEDICARE

## 2024-09-20 ENCOUNTER — PHARMACY VISIT (OUTPATIENT)
Dept: PHARMACY | Facility: CLINIC | Age: 74
End: 2024-09-20
Payer: COMMERCIAL

## 2024-09-20 ENCOUNTER — APPOINTMENT (OUTPATIENT)
Dept: PRIMARY CARE | Facility: CLINIC | Age: 74
End: 2024-09-20
Payer: MEDICARE

## 2024-09-20 ENCOUNTER — HOSPITAL ENCOUNTER (OUTPATIENT)
Facility: HOSPITAL | Age: 74
Setting detail: OUTPATIENT SURGERY
Discharge: HOME | End: 2024-09-20
Attending: SURGERY | Admitting: SURGERY
Payer: MEDICARE

## 2024-09-20 VITALS
TEMPERATURE: 98 F | DIASTOLIC BLOOD PRESSURE: 66 MMHG | WEIGHT: 149 LBS | SYSTOLIC BLOOD PRESSURE: 138 MMHG | OXYGEN SATURATION: 95 % | BODY MASS INDEX: 22.58 KG/M2 | HEART RATE: 68 BPM | HEIGHT: 68 IN | RESPIRATION RATE: 15 BRPM

## 2024-09-20 DIAGNOSIS — I73.9 PAD (PERIPHERAL ARTERY DISEASE) (CMS-HCC): ICD-10-CM

## 2024-09-20 DIAGNOSIS — I73.9 PERIPHERAL VASCULAR DISEASE (CMS-HCC): Primary | ICD-10-CM

## 2024-09-20 LAB
ANION GAP SERPL CALC-SCNC: 9 MMOL/L (ref 10–20)
BUN SERPL-MCNC: 13 MG/DL (ref 6–23)
CALCIUM SERPL-MCNC: 9.3 MG/DL (ref 8.6–10.3)
CHLORIDE SERPL-SCNC: 104 MMOL/L (ref 98–107)
CO2 SERPL-SCNC: 29 MMOL/L (ref 21–32)
CREAT SERPL-MCNC: 1.09 MG/DL (ref 0.5–1.3)
EGFRCR SERPLBLD CKD-EPI 2021: 72 ML/MIN/1.73M*2
ERYTHROCYTE [DISTWIDTH] IN BLOOD BY AUTOMATED COUNT: 13.5 % (ref 11.5–14.5)
GLUCOSE SERPL-MCNC: 101 MG/DL (ref 74–99)
HCT VFR BLD AUTO: 50.9 % (ref 41–52)
HGB BLD-MCNC: 17.3 G/DL (ref 13.5–17.5)
MCH RBC QN AUTO: 30.9 PG (ref 26–34)
MCHC RBC AUTO-ENTMCNC: 34 G/DL (ref 32–36)
MCV RBC AUTO: 91 FL (ref 80–100)
NRBC BLD-RTO: 0 /100 WBCS (ref 0–0)
PLATELET # BLD AUTO: 197 X10*3/UL (ref 150–450)
POTASSIUM SERPL-SCNC: 5.1 MMOL/L (ref 3.5–5.3)
RBC # BLD AUTO: 5.6 X10*6/UL (ref 4.5–5.9)
SODIUM SERPL-SCNC: 137 MMOL/L (ref 136–145)
WBC # BLD AUTO: 7.7 X10*3/UL (ref 4.4–11.3)

## 2024-09-20 PROCEDURE — 2500000002 HC RX 250 W HCPCS SELF ADMINISTERED DRUGS (ALT 637 FOR MEDICARE OP, ALT 636 FOR OP/ED): Performed by: ANESTHESIOLOGY

## 2024-09-20 PROCEDURE — 2500000005 HC RX 250 GENERAL PHARMACY W/O HCPCS: Performed by: NURSE ANESTHETIST, CERTIFIED REGISTERED

## 2024-09-20 PROCEDURE — 3600000008 HC OR TIME - EACH INCREMENTAL 1 MINUTE - PROCEDURE LEVEL THREE: Performed by: SURGERY

## 2024-09-20 PROCEDURE — 85027 COMPLETE CBC AUTOMATED: CPT | Performed by: ANESTHESIOLOGY

## 2024-09-20 PROCEDURE — 2500000001 HC RX 250 WO HCPCS SELF ADMINISTERED DRUGS (ALT 637 FOR MEDICARE OP): Performed by: ANESTHESIOLOGY

## 2024-09-20 PROCEDURE — 3700000001 HC GENERAL ANESTHESIA TIME - INITIAL BASE CHARGE: Performed by: SURGERY

## 2024-09-20 PROCEDURE — 2500000004 HC RX 250 GENERAL PHARMACY W/ HCPCS (ALT 636 FOR OP/ED): Performed by: ANESTHESIOLOGY

## 2024-09-20 PROCEDURE — 7100000009 HC PHASE TWO TIME - INITIAL BASE CHARGE: Performed by: SURGERY

## 2024-09-20 PROCEDURE — 2500000001 HC RX 250 WO HCPCS SELF ADMINISTERED DRUGS (ALT 637 FOR MEDICARE OP): Performed by: PHYSICIAN ASSISTANT

## 2024-09-20 PROCEDURE — 2550000001 HC RX 255 CONTRASTS: Performed by: SURGERY

## 2024-09-20 PROCEDURE — 80048 BASIC METABOLIC PNL TOTAL CA: CPT | Performed by: ANESTHESIOLOGY

## 2024-09-20 PROCEDURE — 75625 CONTRAST EXAM ABDOMINL AORTA: CPT | Performed by: SURGERY

## 2024-09-20 PROCEDURE — 2500000004 HC RX 250 GENERAL PHARMACY W/ HCPCS (ALT 636 FOR OP/ED): Performed by: SURGERY

## 2024-09-20 PROCEDURE — C2623 CATH, TRANSLUMIN, DRUG-COAT: HCPCS | Performed by: SURGERY

## 2024-09-20 PROCEDURE — C1894 INTRO/SHEATH, NON-LASER: HCPCS | Performed by: SURGERY

## 2024-09-20 PROCEDURE — C1769 GUIDE WIRE: HCPCS | Performed by: SURGERY

## 2024-09-20 PROCEDURE — 2720000007 HC OR 272 NO HCPCS: Performed by: SURGERY

## 2024-09-20 PROCEDURE — 37221 PR REVSC OPN/PRQ ILIAC ART W/STNT PLMT & ANGIOPLSTY: CPT | Performed by: SURGERY

## 2024-09-20 PROCEDURE — 76000 FLUOROSCOPY <1 HR PHYS/QHP: CPT

## 2024-09-20 PROCEDURE — RXMED WILLOW AMBULATORY MEDICATION CHARGE

## 2024-09-20 PROCEDURE — 2500000005 HC RX 250 GENERAL PHARMACY W/O HCPCS: Performed by: ANESTHESIOLOGY

## 2024-09-20 PROCEDURE — 3600000003 HC OR TIME - INITIAL BASE CHARGE - PROCEDURE LEVEL THREE: Performed by: SURGERY

## 2024-09-20 PROCEDURE — 7100000010 HC PHASE TWO TIME - EACH INCREMENTAL 1 MINUTE: Performed by: SURGERY

## 2024-09-20 PROCEDURE — C1725 CATH, TRANSLUMIN NON-LASER: HCPCS | Performed by: SURGERY

## 2024-09-20 PROCEDURE — 36415 COLL VENOUS BLD VENIPUNCTURE: CPT | Performed by: ANESTHESIOLOGY

## 2024-09-20 PROCEDURE — 2500000004 HC RX 250 GENERAL PHARMACY W/ HCPCS (ALT 636 FOR OP/ED): Performed by: NURSE ANESTHETIST, CERTIFIED REGISTERED

## 2024-09-20 PROCEDURE — 85347 COAGULATION TIME ACTIVATED: CPT

## 2024-09-20 PROCEDURE — 3700000002 HC GENERAL ANESTHESIA TIME - EACH INCREMENTAL 1 MINUTE: Performed by: SURGERY

## 2024-09-20 RX ORDER — PROPOFOL 10 MG/ML
INJECTION, EMULSION INTRAVENOUS AS NEEDED
Status: DISCONTINUED | OUTPATIENT
Start: 2024-09-20 | End: 2024-09-20

## 2024-09-20 RX ORDER — ONDANSETRON HYDROCHLORIDE 2 MG/ML
4 INJECTION, SOLUTION INTRAVENOUS ONCE AS NEEDED
Status: DISCONTINUED | OUTPATIENT
Start: 2024-09-20 | End: 2024-09-20 | Stop reason: HOSPADM

## 2024-09-20 RX ORDER — FAMOTIDINE 10 MG/ML
20 INJECTION INTRAVENOUS ONCE
Status: COMPLETED | OUTPATIENT
Start: 2024-09-20 | End: 2024-09-20

## 2024-09-20 RX ORDER — DEXMEDETOMIDINE IN 0.9 % NACL 20 MCG/5ML
SYRINGE (ML) INTRAVENOUS AS NEEDED
Status: DISCONTINUED | OUTPATIENT
Start: 2024-09-20 | End: 2024-09-20

## 2024-09-20 RX ORDER — PHENYLEPHRINE HCL IN 0.9% NACL 1 MG/10 ML
SYRINGE (ML) INTRAVENOUS AS NEEDED
Status: DISCONTINUED | OUTPATIENT
Start: 2024-09-20 | End: 2024-09-20

## 2024-09-20 RX ORDER — MIDAZOLAM HYDROCHLORIDE 1 MG/ML
INJECTION, SOLUTION INTRAMUSCULAR; INTRAVENOUS AS NEEDED
Status: DISCONTINUED | OUTPATIENT
Start: 2024-09-20 | End: 2024-09-20

## 2024-09-20 RX ORDER — LORAZEPAM 2 MG/ML
0.5 INJECTION INTRAMUSCULAR ONCE
Status: COMPLETED | OUTPATIENT
Start: 2024-09-20 | End: 2024-09-20

## 2024-09-20 RX ORDER — ONDANSETRON HYDROCHLORIDE 2 MG/ML
4 INJECTION, SOLUTION INTRAVENOUS ONCE
Status: COMPLETED | OUTPATIENT
Start: 2024-09-20 | End: 2024-09-20

## 2024-09-20 RX ORDER — LIDOCAINE HCL/PF 100 MG/5ML
SYRINGE (ML) INTRAVENOUS AS NEEDED
Status: DISCONTINUED | OUTPATIENT
Start: 2024-09-20 | End: 2024-09-20

## 2024-09-20 RX ORDER — SODIUM CHLORIDE 9 MG/ML
50 INJECTION, SOLUTION INTRAVENOUS CONTINUOUS
Status: DISCONTINUED | OUTPATIENT
Start: 2024-09-20 | End: 2024-09-20 | Stop reason: HOSPADM

## 2024-09-20 RX ORDER — IPRATROPIUM BROMIDE AND ALBUTEROL SULFATE 2.5; .5 MG/3ML; MG/3ML
3 SOLUTION RESPIRATORY (INHALATION) ONCE
Status: COMPLETED | OUTPATIENT
Start: 2024-09-20 | End: 2024-09-20

## 2024-09-20 RX ORDER — ASPIRIN 81 MG/1
81 TABLET ORAL DAILY
Qty: 90 TABLET | Refills: 3 | Status: SHIPPED | OUTPATIENT
Start: 2024-09-21

## 2024-09-20 RX ORDER — HEPARIN SODIUM 5000 [USP'U]/ML
INJECTION, SOLUTION INTRAVENOUS; SUBCUTANEOUS AS NEEDED
Status: DISCONTINUED | OUTPATIENT
Start: 2024-09-20 | End: 2024-09-20

## 2024-09-20 RX ORDER — IODIXANOL 270 MG/ML
INJECTION, SOLUTION INTRAVASCULAR AS NEEDED
Status: DISCONTINUED | OUTPATIENT
Start: 2024-09-20 | End: 2024-09-20 | Stop reason: HOSPADM

## 2024-09-20 RX ORDER — CLOPIDOGREL BISULFATE 75 MG/1
75 TABLET ORAL ONCE
Status: COMPLETED | OUTPATIENT
Start: 2024-09-20 | End: 2024-09-20

## 2024-09-20 RX ORDER — PROTAMINE SULFATE 10 MG/ML
INJECTION, SOLUTION INTRAVENOUS AS NEEDED
Status: DISCONTINUED | OUTPATIENT
Start: 2024-09-20 | End: 2024-09-20

## 2024-09-20 RX ORDER — NAPROXEN SODIUM 220 MG/1
81 TABLET, FILM COATED ORAL ONCE
Status: DISCONTINUED | OUTPATIENT
Start: 2024-09-20 | End: 2024-09-20

## 2024-09-20 RX ORDER — NORETHINDRONE AND ETHINYL ESTRADIOL 0.5-0.035
KIT ORAL AS NEEDED
Status: DISCONTINUED | OUTPATIENT
Start: 2024-09-20 | End: 2024-09-20

## 2024-09-20 RX ORDER — ACETAMINOPHEN 325 MG/1
975 TABLET ORAL ONCE
Status: COMPLETED | OUTPATIENT
Start: 2024-09-20 | End: 2024-09-20

## 2024-09-20 RX ORDER — METOCLOPRAMIDE HYDROCHLORIDE 5 MG/ML
5 INJECTION INTRAMUSCULAR; INTRAVENOUS ONCE
Status: COMPLETED | OUTPATIENT
Start: 2024-09-20 | End: 2024-09-20

## 2024-09-20 RX ORDER — ASPIRIN 81 MG/1
81 TABLET ORAL ONCE
Status: COMPLETED | OUTPATIENT
Start: 2024-09-20 | End: 2024-09-20

## 2024-09-20 RX ORDER — SODIUM CITRATE AND CITRIC ACID MONOHYDRATE 334; 500 MG/5ML; MG/5ML
30 SOLUTION ORAL ONCE
Status: COMPLETED | OUTPATIENT
Start: 2024-09-20 | End: 2024-09-20

## 2024-09-20 SDOH — HEALTH STABILITY: MENTAL HEALTH: CURRENT SMOKER: 1

## 2024-09-20 ASSESSMENT — PAIN SCALES - GENERAL
PAIN_LEVEL: 2
PAINLEVEL_OUTOF10: 0 - NO PAIN

## 2024-09-20 ASSESSMENT — PAIN - FUNCTIONAL ASSESSMENT

## 2024-09-20 ASSESSMENT — COLUMBIA-SUICIDE SEVERITY RATING SCALE - C-SSRS
2. HAVE YOU ACTUALLY HAD ANY THOUGHTS OF KILLING YOURSELF?: NO
6. HAVE YOU EVER DONE ANYTHING, STARTED TO DO ANYTHING, OR PREPARED TO DO ANYTHING TO END YOUR LIFE?: NO
1. IN THE PAST MONTH, HAVE YOU WISHED YOU WERE DEAD OR WISHED YOU COULD GO TO SLEEP AND NOT WAKE UP?: NO

## 2024-09-20 NOTE — DISCHARGE INSTRUCTIONS
-OK to remove dressing 48 hours after surgery  -OK to shower 48 hours postoperatively  -No heavy lifting greater than 10 pounds, no strenuous or excessive activity or exercise until cleared at your postoperative follow up visit  -Diet as tolerated  -Call the office with questions or concerns    Continue taking your plavix and statin daily. Start taking aspirin 81mg once daily starting 9/21/24

## 2024-09-20 NOTE — INTERVAL H&P NOTE
H&P reviewed. The patient was examined and there are no changes to the H&P.    73 year old male with PAD and short distance claudication history of right to left femoral femoral bypass scheduled for angiogram with femoral femoral bypass angioplasty possible iliac angioplasty with Dr Marte 9/20/24

## 2024-09-20 NOTE — OP NOTE
Bilateral Angiogram Lower Extremity with Right iliac common femoral artery angioplasty (c arm) *DREW WITH BARD* (B) Operative Note     Date: 2024  OR Location: [facility]    Name: Joe Hester, : 1950, Age: 73 y.o., MRN: [id number], Sex: male    Diagnosis  Pre-op Diagnosis      * PAD (peripheral artery disease) (CMS-[facility]) [I73.9] Post-op Diagnosis     * PAD (peripheral artery disease) (CMS-HCC) [I73.9]     Procedures  Bilateral Angiogram Lower Extremity with Right iliac common femoral artery angioplasty (c arm) *DREW WITH BARD*  16119 - NM SLCTV CATHJ EA 1ST ORD ABDL PEL/LXTR ART BRNCH    Bilateral Angiogram Lower Extremity with Right iliac common femoral artery angioplasty (c arm) *DREW WITH BARD*  01919 - CHG AORTOGRAPHY ABDOMINAL SERIALOGRAPHY RS&I    Bilateral Angiogram Lower Extremity with Right iliac common femoral artery angioplasty (c arm) *DREW WITH BARD*  21713 - CHG US VASC ACCESS SITS VSL PATENCY NDL ENTRY    Bilateral Angiogram Lower Extremity with Right iliac common femoral artery angioplasty (c arm) *DREW WITH BARD*  44966 - NM REVSC OPN/PRQ ILIAC ART W/STNT PLMT & ANGIOPLSTY    Bilateral Angiogram Lower Extremity with Right iliac common femoral artery angioplasty (c arm) *DREW WITH BARD*  81661 - NM REVSC OPN/PRG FEM/POP W/ANGIOPLASTY UNI      Surgeons      * Marvin Marte - Primary    Resident/Fellow/Other Assistant:  Surgeons and Role:     * Krystin Munoz PA-C - CONCHITA First Assist    Procedure Summary  Anesthesia: Monitor Anesthesia Care  ASA: III  Anesthesia Staff: CRNA: AMANDA Cummings-CRNA  Estimated Blood Loss: 10mL  Intra-op Medications:   Administrations occurring from 0715 to 0915 on 24:   Medication Name Total Dose   iodixanol (VISIPaque) 270 mg iodine/mL injection 50 mL   heparin (porcine) 5,000 Units in sodium chloride 0.9% 500 mL irrigation 5,000 Units   sodium chloride 0.9% infusion 86.67 mL   dexAMETHasone (Decadron) injection 8 mg 8 mg   famotidine PF (Pepcid)  injection 20 mg 20 mg   LORazepam (Ativan) injection 0.5 mg 0.5 mg   metoclopramide (Reglan) injection 5 mg 5 mg   ondansetron (Zofran) injection 4 mg 4 mg   oxygen (O2) therapy 263.25 L              Anesthesia Record               Intraprocedure I/O Totals          Intake    sodium chloride 0.9% infusion 600.00 mL    Total Intake 600 mL       Output    Est. Blood Loss 10 mL    Total Output 10 mL       Net    Net Volume 590 mL          Specimen: No specimens collected     Staff:   Circulator: Solange  Scrub Person: Kristina         Drains and/or Catheters: * None in log *    Tourniquet Times:         Implants:     Findings: cfa stenosis right    Indications: Joe Hester is an 73 y.o. male who is having surgery for I73.9. Bilateral lower extremity claudication lifestyle inhibiting    The patient was seen in the preoperative area. The risks, benefits, complications, treatment options, non-operative alternatives, expected recovery and outcomes were discussed with the patient. The possibilities of reaction to medication, pulmonary aspiration, injury to surrounding structures, bleeding, recurrent infection, the need for additional procedures, failure to diagnose a condition, and creating a complication requiring transfusion or operation were discussed with the patient. The patient concurred with the proposed plan, giving informed consent.  The site of surgery was properly noted/marked if necessary per policy. The patient has been actively warmed in preoperative area. Preoperative antibiotics are not indicated. Venous thrombosis prophylaxis are not indicated.    Procedure Details: Patient was brought to the OR suite placed in supine position ministered monitored anesthesia care.  Both groins are sterilely prepped draped standard fashion.  The SFA was accessed under ultrasound guidance in the proximal thigh without event utilizing micropuncture technique.  I advanced the wire with no difficulty sheath was advanced.   Retrograde images are taken demonstrate patency.  The 6 Tristanian sheath was advanced.  Glidewire was utilized with Berenstein catheter access the right iliac artery to the point of the right common iliac stent.  Images were taken demonstrating stenosis noted at the mid common femoral artery as well as the distal external iliac artery With blunted flow noted through the femorofemoral bypass past graft down both limbs.  Patient was systemically heparinized.  A 6 x 100 ultra score balloon was utilized to inflate the distal extrailiac down to the common femoral artery in usual fashion.  This was postdilated with a 6 x 120 Impact DCB balloon.  Excellent flow was noted through the left iliac circuit as well as the common femoral artery and brisk flow was noted through both limbs.  Multiple images were taken in HARDIK and JOHNSON image trying to confirm anastomotic stenosis which was unremarkable.  There is brisk flow down into bilateral femoral arteries as well as popliteal artery patency noted.  I terminated procedure catheter wires removed manual compression was applied and excellent hemostasis was maintained patient was sent to PACU in stable condition.    Complications:  None; patient tolerated the procedure well.    Disposition: PACU - hemodynamically stable.  Condition: stable         Additional Details: No qualified vascular fellow was available for assistance in the above-noted procedure.  GEORGE Carver was available for the entirety of the procedure.  She assisted in all components of the procedure from start to completion.     Attending Attestation: I was present and scrubbed for the entire procedure.    Marvin Marte  Phone Number: [phone]

## 2024-09-20 NOTE — ANESTHESIA PREPROCEDURE EVALUATION
Patient: Joe Hester    Procedure Information       Date/Time: 09/20/24 0715    Procedure: Bilateral Angiogram Lower Extremity with femorofemoral angioplasty, possible left Iliac angioplasty (c arm) *DREW WITH * (Bilateral) - 90 minutes procedure time    Location: [facility] OR 08 / Virtual [facility]    Surgeons: Marvin Marte, DO            Relevant Problems   Anesthesia (within normal limits)      Cardiac   (+) Atherosclerosis of native artery of left lower extremity with intermittent claudication (CMS-HCC)   (+) Hyperlipidemia   (+) Hypertension   (+) Peripheral vascular disease (CMS-HCC)   (+) Stenosis of iliac artery (CMS-HCC)      Pulmonary   (+) Asthma (HHS-HCC)   (+) COPD (chronic obstructive pulmonary disease) (Multi)      Neuro   (+) Anxiety   (+) Depression, major, single episode, mild (CMS-HCC)   (+) Lumbar radiculopathy   (+) PTSD (post-traumatic stress disorder)   (+) Peripheral neuropathy      GI   (+) Dysphagia      Liver   (+) Chronic hepatitis C without hepatic coma (Multi)   (+) Hepatitis      Musculoskeletal   (+) Chronic pain disorder   (+) Degeneration of intervertebral disc of lumbar region   (+) Lumbar spondylosis   (+) Spinal stenosis of lumbosacral region      ID   (+) Chronic hepatitis C without hepatic coma (Multi)       Clinical information reviewed:   Tobacco  Allergies  Meds  Problems  Med Hx  Surg Hx   Fam Hx  Soc   Hx        NPO Detail:  NPO/Void Status  Date of Last Liquid: 09/19/24  Date of Last Solid: 09/19/24         Physical Exam    Airway  Mallampati: II  TM distance: >3 FB  Neck ROM: full     Cardiovascular - normal exam     Dental   (+) upper dentures, lower dentures     Pulmonary - normal exam     Abdominal - normal exam             Anesthesia Plan    History of general anesthesia?: yes  History of complications of general anesthesia?: no    ASA 3     MAC     The patient is a current smoker.  Patient was previously instructed to abstain from smoking on day of  procedure.  Patient smoked on day of procedure.    intravenous induction   Anesthetic plan and risks discussed with patient.  Use of blood products discussed with patient who consented to blood products.    Plan discussed with CRNA.

## 2024-09-20 NOTE — ANESTHESIA POSTPROCEDURE EVALUATION
Patient: Joe Hester    Procedure Summary       Date: 09/20/24 Room / Location: [facility] 08 / Virtual [facility]    Anesthesia Start: 0745 Anesthesia Stop: 0855    Procedure: Bilateral Angiogram Lower Extremity with Right iliac common femoral artery angioplasty (c arm) *DREW ZIMMERMAN* (Bilateral) Diagnosis:       PAD (peripheral artery disease) (CMS-[facility])      (I73.9)    Surgeons: Marvin Marte DO Responsible Provider: AMANDA Cummings-CRNA    Anesthesia Type: MAC ASA Status: 3            Anesthesia Type: MAC    Vitals Value Taken Time   /48 09/20/24 1240   Temp 36.7 °C (98 °F) 09/20/24 0848   Pulse 75 09/20/24 1243   Resp 6 09/20/24 1244   SpO2 91 % 09/20/24 1244   Vitals shown include unfiled device data.    Anesthesia Post Evaluation    Patient location during evaluation: bedside  Patient participation: complete - patient participated  Level of consciousness: awake and alert  Pain score: 2  Pain management: adequate  Airway patency: patent  Cardiovascular status: acceptable  Respiratory status: acceptable  Hydration status: acceptable  Postoperative Nausea and Vomiting: none        There were no known notable events for this encounter.

## 2024-09-23 LAB
ACT BLD: 164 SEC (ref 89–169)
ACT BLD: 324 SEC (ref 89–169)

## 2024-10-02 ENCOUNTER — APPOINTMENT (OUTPATIENT)
Dept: VASCULAR SURGERY | Facility: HOSPITAL | Age: 74
End: 2024-10-02
Payer: MEDICARE

## 2024-10-03 ENCOUNTER — OFFICE VISIT (OUTPATIENT)
Dept: VASCULAR SURGERY | Facility: HOSPITAL | Age: 74
End: 2024-10-03
Payer: MEDICARE

## 2024-10-03 VITALS
WEIGHT: 159 LBS | SYSTOLIC BLOOD PRESSURE: 135 MMHG | HEART RATE: 65 BPM | BODY MASS INDEX: 24.18 KG/M2 | DIASTOLIC BLOOD PRESSURE: 79 MMHG

## 2024-10-03 DIAGNOSIS — I73.9 PAD (PERIPHERAL ARTERY DISEASE) (CMS-HCC): ICD-10-CM

## 2024-10-03 PROCEDURE — 3075F SYST BP GE 130 - 139MM HG: CPT | Performed by: SURGERY

## 2024-10-03 PROCEDURE — 1159F MED LIST DOCD IN RCRD: CPT | Performed by: SURGERY

## 2024-10-03 PROCEDURE — 3078F DIAST BP <80 MM HG: CPT | Performed by: SURGERY

## 2024-10-03 PROCEDURE — 99212 OFFICE O/P EST SF 10 MIN: CPT | Performed by: SURGERY

## 2024-10-03 NOTE — PROGRESS NOTES
Subjective   Patient ID: Joe Hester is a 73 y.o. male who presents for Follow-up (B/l angiogram with right iliac/fem angioplasty).  HPI  Patient status post right iliac common femoral artery angioplasty  At this time he feels much improved he is active and amatory distance is much improved  No evidence of rest pain able to sleep at night without difficulty compared to preprocedure    Review of Systems    Objective   Physical Exam  Physical exam    Constitutional: alert and in no acute distress verbal  Eyes: No erythema swelling or discharge noted  Neck: supple, symmetric, trachea midline, no masses noted  Cardiovascular: Carotid pulses 2+, no obvious bruit, no Jugular distension noted, no thrill, heart regular rate, lower extremity vascular exam intact, cap refill <2 sec  Pulmonary:  Bilateral breath sounds intact, clear with rales rhonchi or wheeze  Abdomen: soft non tender, no pulsatile masses noted, no rebound rigidity or guarding noted  Skin: intact warm no abnormal turgor  Psychiatric: alert without any obvious cognitive issues, oriented to person, place, and time    Assessment/Plan   Peripheral arterial disease  Status post iliac and common femoral artery angioplasty right  Continue antiplatelet therapy  Increase activity as tolerated  Arterial duplex and KVNG pending and ordered  Follow-up in 3 months         Marvin Marte DO 10/03/24 3:09 PM

## 2024-10-04 ENCOUNTER — APPOINTMENT (OUTPATIENT)
Dept: PRIMARY CARE | Facility: CLINIC | Age: 74
End: 2024-10-04
Payer: MEDICARE

## 2024-10-04 VITALS
HEIGHT: 68 IN | BODY MASS INDEX: 23.95 KG/M2 | WEIGHT: 158 LBS | DIASTOLIC BLOOD PRESSURE: 68 MMHG | SYSTOLIC BLOOD PRESSURE: 102 MMHG | HEART RATE: 58 BPM

## 2024-10-04 DIAGNOSIS — E53.9 VITAMIN B DEFICIENCY: ICD-10-CM

## 2024-10-04 DIAGNOSIS — E78.2 MIXED HYPERLIPIDEMIA: ICD-10-CM

## 2024-10-04 DIAGNOSIS — M54.16 LUMBAR RADICULOPATHY: ICD-10-CM

## 2024-10-04 DIAGNOSIS — J45.909 ASTHMA, UNSPECIFIED ASTHMA SEVERITY, UNSPECIFIED WHETHER COMPLICATED, UNSPECIFIED WHETHER PERSISTENT (HHS-HCC): ICD-10-CM

## 2024-10-04 DIAGNOSIS — I10 PRIMARY HYPERTENSION: ICD-10-CM

## 2024-10-04 DIAGNOSIS — E55.9 VITAMIN D DEFICIENCY: ICD-10-CM

## 2024-10-04 DIAGNOSIS — I70.212 ATHEROSCLEROSIS OF NATIVE ARTERY OF LEFT LOWER EXTREMITY WITH INTERMITTENT CLAUDICATION (CMS-HCC): ICD-10-CM

## 2024-10-04 DIAGNOSIS — F17.219 CIGARETTE NICOTINE DEPENDENCE WITH NICOTINE-INDUCED DISORDER: ICD-10-CM

## 2024-10-04 DIAGNOSIS — J42 CHRONIC BRONCHITIS, UNSPECIFIED CHRONIC BRONCHITIS TYPE (MULTI): ICD-10-CM

## 2024-10-04 RX ORDER — CHLORTHALIDONE 25 MG/1
25 TABLET ORAL DAILY
Qty: 90 TABLET | Refills: 3 | Status: SHIPPED | OUTPATIENT
Start: 2024-10-04 | End: 2025-09-29

## 2024-10-04 RX ORDER — CLONIDINE HYDROCHLORIDE 0.1 MG/1
0.1 TABLET ORAL DAILY
Qty: 90 TABLET | Refills: 3 | Status: SHIPPED | OUTPATIENT
Start: 2024-10-04 | End: 2025-09-29

## 2024-10-04 RX ORDER — LISINOPRIL 40 MG/1
40 TABLET ORAL DAILY
Qty: 90 TABLET | Refills: 3 | Status: SHIPPED | OUTPATIENT
Start: 2024-10-04

## 2024-10-04 RX ORDER — GABAPENTIN 300 MG/1
300 CAPSULE ORAL NIGHTLY
Qty: 30 CAPSULE | Refills: 2 | Status: SHIPPED | OUTPATIENT
Start: 2024-10-04 | End: 2025-01-02

## 2024-10-04 RX ORDER — AMLODIPINE BESYLATE 5 MG/1
5 TABLET ORAL DAILY
Qty: 90 TABLET | Refills: 3 | Status: SHIPPED | OUTPATIENT
Start: 2024-10-04

## 2024-10-04 RX ORDER — ATORVASTATIN CALCIUM 10 MG/1
10 TABLET, FILM COATED ORAL DAILY
Qty: 90 TABLET | Refills: 3 | Status: SHIPPED | OUTPATIENT
Start: 2024-10-04 | End: 2025-09-29

## 2024-10-04 ASSESSMENT — ENCOUNTER SYMPTOMS
BLOOD IN STOOL: 0
UNEXPECTED WEIGHT CHANGE: 0
ARTHRALGIAS: 0
VOMITING: 0
FATIGUE: 0
LOSS OF SENSATION IN FEET: 0
SORE THROAT: 0
SHORTNESS OF BREATH: 0
COUGH: 0
BACK PAIN: 0
BRUISES/BLEEDS EASILY: 0
SEIZURES: 0
CONFUSION: 0
HEADACHES: 0
DIZZINESS: 0
ACTIVITY CHANGE: 0
WHEEZING: 0
DYSURIA: 0
CHILLS: 0
NAUSEA: 0
DIARRHEA: 0
SLEEP DISTURBANCE: 0
MYALGIAS: 0
CHEST TIGHTNESS: 0
PHOTOPHOBIA: 0
DEPRESSION: 0
WOUND: 0
POLYDIPSIA: 0
CONSTIPATION: 0
OCCASIONAL FEELINGS OF UNSTEADINESS: 0
ABDOMINAL PAIN: 0
EYE PAIN: 0
FEVER: 0
HEMATURIA: 0
APPETITE CHANGE: 0
PALPITATIONS: 0
FLANK PAIN: 0
JOINT SWELLING: 0
NECK PAIN: 0
TROUBLE SWALLOWING: 0

## 2024-10-04 NOTE — PROGRESS NOTES
Subjective   Patient ID: Joe Hester is a 73 y.o. male who presents for Follow-up (Follow up).  HPI    Here today for a follow up.      Previously seen with complaints of bilateral lower extremity discomfort. Following with Dr. Marte for Vascular. Referred to Dr. Bucio previously for spine evaluation of Neuropathic Pain, Bilateral lower extremities. Patient has Medical Marijuana. Recent procedure with Dr. Marte and follow up yesterday. Doing well. Has not followed with Cardiology for years, interested in seeing provider.      [facility] been [facility] in [facility]. Patient states that he is down to 5 cigarettes per day, continuing to work on Cessation. Now following with Pulmonology. Chronic shortness of breath with exertion. COPD. Patient follows with Dr. Engel for Pulmonology. Inhalers adjusted.      Lisinopril, Amlodipine, Chlorthalidone, and Clonidine for his Hypertension, tolerating Rx. Has been on Suboxone, following with Counseling at well. Blood pressure has been better controlled.      Trouble with Anxiety and Stress. PTSD related. Did not tolerate medications in the past. Triggered by his history of Incarceration. Hopeful that Medical Marijuana will continue to provide him some benefit. Has noted some improvement with starting the medical marijuana.      Now following with [facility] for Suboxone. Following twice a month. Suboxone dosage has been the same.     Review of Systems   Constitutional:  Negative for activity change, appetite change, chills, fatigue, fever and unexpected weight change.   HENT:  Negative for ear pain, hearing loss, nosebleeds, sore throat, tinnitus and trouble swallowing.    Eyes:  Negative for photophobia, pain and visual disturbance.   Respiratory:  Negative for cough, chest tightness, shortness of breath and wheezing.    Cardiovascular:  Negative for chest pain, palpitations and leg swelling.   Gastrointestinal:  Negative for abdominal pain, blood in stool,  constipation, diarrhea, nausea and vomiting.   Endocrine: Negative for cold intolerance, heat intolerance, polydipsia and polyuria.   Genitourinary:  Negative for dysuria, flank pain and hematuria.   Musculoskeletal:  Negative for arthralgias, back pain, joint swelling, myalgias and neck pain.   Skin:  Negative for pallor, rash and wound.   Allergic/Immunologic: Negative for immunocompromised state.   Neurological:  Negative for dizziness, seizures and headaches.   Hematological:  Does not bruise/bleed easily.   Psychiatric/Behavioral:  Negative for confusion and sleep disturbance.        Objective   Physical Exam  Vitals and nursing note reviewed.   Constitutional:       General: He is not in acute distress.     Appearance: Normal appearance.   HENT:      Head: Normocephalic.      Nose: Nose normal.   Eyes:      Conjunctiva/sclera: Conjunctivae normal.   Neck:      Vascular: No carotid bruit.   Cardiovascular:      Rate and Rhythm: Normal rate and regular rhythm.      Pulses: Normal pulses.      Heart sounds: Normal heart sounds.   Pulmonary:      Effort: Pulmonary effort is normal.      Breath sounds: Normal breath sounds.   Abdominal:      General: Bowel sounds are normal.      Palpations: Abdomen is soft.   Musculoskeletal:         General: Normal range of motion.      Cervical back: Normal range of motion.   Skin:     General: Skin is warm and dry.   Neurological:      Mental Status: He is alert and oriented to person, place, and time. Mental status is at baseline.   Psychiatric:         Mood and Affect: Mood normal.         Behavior: Behavior normal.       Assessment/Plan        Has an updated order for blood work to have completed.      #1. Hypertension: Blood pressure better controlled at OV today. Continue Lisinopril, Clonidine, Chlorthalidone, and Norvasc. DASH diet. Ambulatory blood pressure monitoring. Lab work ordered.   #2. COPD: Chronic shortness of breath with exertion. Following with Pulmonology.    #3. Anxiety and Depression: Did not tolerate Wellbutrin. States that he was previously on multiple medications. Prozac, Xanax, Trazodone and Zoloft. Unsure of the other medications. Discontinued Lexapro. Ativan and Klonopin PRN, did not tolerate. Valium PRN effective in the past. Unable to continue to prescribe due to inconsistencies with Drug Screen. Patient aware. Now following for Medical Marijuana.   #4. Tobacco Use: Patient advised to quit smoking tobacco. The risks of smoking were reviewed with the patient and he was offered medication and/or smoking cessation counseling to help. Did not tolerate Wellbutrin. Declined any other Rx. at this time. Currently smoking 5 cigarettes/day. LDCT: June 2024, stable. 3 minutes were spent counseling the patient on tobacco cessation.  Benefits of cessation were discussed as well as techniques to help quit.    #5. Vitamin B12 Deficiency: Continue Vitamin B12 OTC. Reevaluate with next blood work.  #6. Vitamin D Deficiency: Reevaluate with next blood work.  #7. Hepatitis C: Completed treatment through GI previously years ago. Recent testing indicated Viral Load not detected.   #8. 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 Stenosis of Iliac Artery, PVD: Following with Dr. Marte for management/evaluation. Continue Plavix. Encouraged smoking cessation.   #10. Hyperlipidemia: Lipitor. Lifestyle changes. Reevaluate with next lab work. Referral to Cardiology as requested.   #11. Opioid Dependence: Following with Specialist for management with Suboxone.     PSA Exam: May 2017. To be done with next lab work.   Colonoscopy: January 2016. Recommended to repeat in 10 years.   Medicare Wellness: March 2024.   Prevnar 20: March 2024.   COVID Vaccine: March/November 2021. Discussed Booster.  Flu Vaccine: September 2023.  Declined updated immunizations at visit today.     Dee Britt, APRN-CNS 10/04/24 9:42 AM

## 2024-10-07 PROBLEM — M00.859: Status: ACTIVE | Noted: 2023-04-18

## 2024-10-07 PROBLEM — F17.200 SMOKER: Status: ACTIVE | Noted: 2023-11-07

## 2024-10-07 PROBLEM — I25.10 CORONARY ARTERY DISEASE DUE TO LIPID RICH PLAQUE: Status: ACTIVE | Noted: 2023-04-18

## 2024-10-07 PROBLEM — I45.10 RIGHT BUNDLE BRANCH BLOCK: Status: ACTIVE | Noted: 2017-09-21

## 2024-10-07 PROBLEM — I25.83 CORONARY ARTERY DISEASE DUE TO LIPID RICH PLAQUE: Status: ACTIVE | Noted: 2023-04-18

## 2024-10-07 PROBLEM — T82.392S: Status: ACTIVE | Noted: 2023-04-18

## 2024-10-07 PROBLEM — F11.21: Status: ACTIVE | Noted: 2023-04-18

## 2024-10-07 PROBLEM — M16.0 BILATERAL HIP JOINT ARTHRITIS: Status: ACTIVE | Noted: 2023-04-18

## 2024-10-07 PROBLEM — I73.9 INTERMITTENT CLAUDICATION (CMS-HCC): Status: ACTIVE | Noted: 2022-12-22

## 2024-10-07 PROBLEM — F12.90 MARIJUANA SMOKER: Status: ACTIVE | Noted: 2024-10-07

## 2024-10-07 PROBLEM — I74.5 EMBOLISM AND THROMBOSIS OF ILIAC ARTERY (MULTI): Status: ACTIVE | Noted: 2017-09-21

## 2024-10-07 PROBLEM — H26.9 BILATERAL CATARACTS: Status: ACTIVE | Noted: 2021-09-20

## 2024-10-14 ENCOUNTER — APPOINTMENT (OUTPATIENT)
Dept: ORTHOPEDIC SURGERY | Facility: CLINIC | Age: 74
End: 2024-10-14
Payer: MEDICARE

## 2024-10-14 ENCOUNTER — APPOINTMENT (OUTPATIENT)
Dept: RADIOLOGY | Facility: CLINIC | Age: 74
End: 2024-10-14
Payer: MEDICARE

## 2024-12-04 ENCOUNTER — HOSPITAL ENCOUNTER (OUTPATIENT)
Dept: VASCULAR MEDICINE | Facility: HOSPITAL | Age: 74
Discharge: HOME | End: 2024-12-04
Payer: MEDICARE

## 2024-12-04 DIAGNOSIS — I73.9 PAD (PERIPHERAL ARTERY DISEASE) (CMS-HCC): ICD-10-CM

## 2024-12-04 PROCEDURE — 93922 UPR/L XTREMITY ART 2 LEVELS: CPT | Performed by: INTERNAL MEDICINE

## 2024-12-04 PROCEDURE — 93922 UPR/L XTREMITY ART 2 LEVELS: CPT

## 2024-12-04 PROCEDURE — 93925 LOWER EXTREMITY STUDY: CPT

## 2024-12-04 PROCEDURE — 93925 LOWER EXTREMITY STUDY: CPT | Performed by: INTERNAL MEDICINE

## 2024-12-12 NOTE — PATIENT INSTRUCTIONS
"Thanks for following up in office today.    1)  We will schedule you for further testing including left heart catheterization and an echocardiogram due to your chest pain and shortness of breath with activity.     2)  Please go get your blood work done tomorrow prior to your catheterization. We will check your cholesterol and kidney function. Drink a lot of water.    3)  Please continue your cardiac medications as prescribed.    Follow up to be scheduled post catheterization  If you have any questions, please call [phone] and choose option for Dr. Alvarez's nurse Shasha Guan  __________________  Cardiac catheterization, sometimes called \"cardiac cath,\" is a procedure doctors can do to look for certain heart problems.   Most people who have a cardiac cath have a test called \"coronary angiography\" done as part of the procedure. Doctors do coronary angiography to look at the arteries in a person's heart. That way, they can see if there are any blockages in the arteries and how serious the blockages are.  Some people have a cardiac cath without having coronary angiography. Doctors can do a cardiac cath to look for problems in the heart chambers or valves. To get information about the heart chambers or valves, doctors can measure the pressures or amounts of oxygen in the blood in different parts of the heart.  Cardiac cath is done in the hospital. You will be awake during the procedure, but your doctor will give you medicine to help you feel relaxed.  Your doctor will make a very small cut in the top, inner part of your leg, or at your wrist. (He or she will numb this area first.) He or she will put a thin plastic tube, called a \"catheter,\" in a blood vessel which is just below the cut. Then he or she will advance the tube through your blood vessels to your heart. While this is happening, an X-ray will take pictures of the tube in your body. This helps your doctor know when the tube has reached the correct place " in your heart.  When the tube is in place, your doctor will do tests. If you are having coronary angiography, your doctor will inject a dye into the tube that shows up on an X-ray. This dye can show if any of the arteries in your heart are clogged. Your body might feel warm during this part of the test.  If your arteries are clogged, your doctor might do a procedure to open them during the catheterization.   After the procedure, your doctor will remove the tube from your body and put pressure on the cut to prevent bleeding. You will need to rest in the hospital for a few hours. You will probably be able to go home after that, but someone else will need to drive you. If your doctor fixes any of your arteries, you will probably need to stay in the hospital overnight.    What is stent/stenting?  Stenting is a procedure used to treat some people with coronary heart disease. This procedure opens narrowed or blocked arteries in the heart.  A heart stent is a tiny metal tube (about one inch long) that helps prop open an artery in the heart. Many heart stents are coated with a medicine that helps keep the artery from getting narrow or blocked again.  Your doctor will prescribe aspirin and another medicine to help prevent clots inside the stent. It is very important that you take these medicines as directed and that you keep taking them unless your doctor says it's OK to stop. People who stop taking these medicines too soon increase their risk of a heart attack or even death.  Sometimes the blockages are too extensive or in an area where stents cannot be placed safely. In that case you may need bypass surgery.

## 2024-12-13 ENCOUNTER — TELEPHONE (OUTPATIENT)
Dept: CARDIOLOGY | Facility: HOSPITAL | Age: 74
End: 2024-12-13

## 2024-12-13 ENCOUNTER — OFFICE VISIT (OUTPATIENT)
Dept: CARDIOLOGY | Facility: HOSPITAL | Age: 74
End: 2024-12-13
Payer: MEDICARE

## 2024-12-13 VITALS
SYSTOLIC BLOOD PRESSURE: 126 MMHG | HEART RATE: 59 BPM | BODY MASS INDEX: 24.49 KG/M2 | DIASTOLIC BLOOD PRESSURE: 70 MMHG | WEIGHT: 161.6 LBS | HEIGHT: 68 IN

## 2024-12-13 DIAGNOSIS — R06.02 SHORTNESS OF BREATH: ICD-10-CM

## 2024-12-13 DIAGNOSIS — R06.09 DYSPNEA ON EXERTION: ICD-10-CM

## 2024-12-13 DIAGNOSIS — I10 PRIMARY HYPERTENSION: ICD-10-CM

## 2024-12-13 DIAGNOSIS — I20.0 ACCELERATING ANGINA (MULTI): Primary | ICD-10-CM

## 2024-12-13 DIAGNOSIS — E78.2 MIXED HYPERLIPIDEMIA: ICD-10-CM

## 2024-12-13 LAB
ATRIAL RATE: 59 BPM
P AXIS: 79 DEGREES
P OFFSET: 186 MS
P ONSET: 130 MS
PR INTERVAL: 182 MS
Q ONSET: 221 MS
QRS COUNT: 10 BEATS
QRS DURATION: 128 MS
QT INTERVAL: 430 MS
QTC CALCULATION(BAZETT): 425 MS
QTC FREDERICIA: 427 MS
R AXIS: 97 DEGREES
T AXIS: 58 DEGREES
T OFFSET: 436 MS
VENTRICULAR RATE: 59 BPM

## 2024-12-13 PROCEDURE — 3008F BODY MASS INDEX DOCD: CPT | Performed by: INTERNAL MEDICINE

## 2024-12-13 PROCEDURE — 99205 OFFICE O/P NEW HI 60 MIN: CPT | Performed by: INTERNAL MEDICINE

## 2024-12-13 PROCEDURE — 4004F PT TOBACCO SCREEN RCVD TLK: CPT | Performed by: INTERNAL MEDICINE

## 2024-12-13 PROCEDURE — 1159F MED LIST DOCD IN RCRD: CPT | Performed by: INTERNAL MEDICINE

## 2024-12-13 PROCEDURE — 99215 OFFICE O/P EST HI 40 MIN: CPT | Mod: 25 | Performed by: INTERNAL MEDICINE

## 2024-12-13 PROCEDURE — 93005 ELECTROCARDIOGRAM TRACING: CPT | Performed by: INTERNAL MEDICINE

## 2024-12-13 PROCEDURE — 3074F SYST BP LT 130 MM HG: CPT | Performed by: INTERNAL MEDICINE

## 2024-12-13 PROCEDURE — 3078F DIAST BP <80 MM HG: CPT | Performed by: INTERNAL MEDICINE

## 2024-12-13 SDOH — ECONOMIC STABILITY: FOOD INSECURITY: WITHIN THE PAST 12 MONTHS, YOU WORRIED THAT YOUR FOOD WOULD RUN OUT BEFORE YOU GOT MONEY TO BUY MORE.: OFTEN TRUE

## 2024-12-13 SDOH — ECONOMIC STABILITY: FOOD INSECURITY: WITHIN THE PAST 12 MONTHS, THE FOOD YOU BOUGHT JUST DIDN'T LAST AND YOU DIDN'T HAVE MONEY TO GET MORE.: OFTEN TRUE

## 2024-12-13 NOTE — TELEPHONE ENCOUNTER
----- Message from Tanisha GARCIA sent at 12/13/2024 10:31 AM EST -----  Regarding: MM patient scheduled for L&R cath for 1/8/25  Can you please call him with instructions for his L & R heart cath scheduled with MM for 1/8/25    Arrive 830a  Procedure 930a    ## He wanted me to make sure everyone knows he is allergic to Fetanyl##    He also knows to go and have his labs drawn.

## 2024-12-13 NOTE — TELEPHONE ENCOUNTER
L&R cath for 1/8/25     Arrival time 0830  Procedure time 0930    Called cath instructions to patient including review of date and arrival time.  Verified no need for dye prep.  Labs ordered.  Nothing to eat or drink after midnight except aspirin, plavix,  with a sip of water the morning of the cath.  You will need a .  Bring your ID, insurance cards and either a perfect list of the medications you are swallowing or the medications in original bottles.  Wear comfortable clothing.  There is a possibility of staying over night for observation so pack a bag with necessities for that.  Patient correctly repeated instructions, verbalized understanding and is agreeable to the plan.

## 2024-12-14 ENCOUNTER — LAB (OUTPATIENT)
Dept: LAB | Facility: LAB | Age: 74
End: 2024-12-14
Payer: MEDICARE

## 2024-12-14 DIAGNOSIS — I70.212 ATHEROSCLEROSIS OF NATIVE ARTERY OF LEFT LOWER EXTREMITY WITH INTERMITTENT CLAUDICATION (CMS-HCC): ICD-10-CM

## 2024-12-14 DIAGNOSIS — F17.219 CIGARETTE NICOTINE DEPENDENCE WITH NICOTINE-INDUCED DISORDER: ICD-10-CM

## 2024-12-14 DIAGNOSIS — I20.0 ACCELERATING ANGINA (MULTI): ICD-10-CM

## 2024-12-14 DIAGNOSIS — I10 PRIMARY HYPERTENSION: ICD-10-CM

## 2024-12-14 DIAGNOSIS — E55.9 VITAMIN D DEFICIENCY: ICD-10-CM

## 2024-12-14 DIAGNOSIS — J45.909 ASTHMA, UNSPECIFIED ASTHMA SEVERITY, UNSPECIFIED WHETHER COMPLICATED, UNSPECIFIED WHETHER PERSISTENT (HHS-HCC): ICD-10-CM

## 2024-12-14 DIAGNOSIS — E78.2 MIXED HYPERLIPIDEMIA: ICD-10-CM

## 2024-12-14 DIAGNOSIS — R06.02 SHORTNESS OF BREATH: ICD-10-CM

## 2024-12-14 DIAGNOSIS — Z12.5 PROSTATE CANCER SCREENING: ICD-10-CM

## 2024-12-14 DIAGNOSIS — Z00.00 MEDICARE ANNUAL WELLNESS VISIT, SUBSEQUENT: ICD-10-CM

## 2024-12-14 DIAGNOSIS — R06.09 DYSPNEA ON EXERTION: ICD-10-CM

## 2024-12-17 NOTE — TELEPHONE ENCOUNTER
Left & Right heart cath for 1/8/25    Arrival time 0830  Procedure time 0930    Called cath instructions to patient including review of date and arrival time.  Verified no need for dye prep.  Labs ordered.  Nothing to eat or drink after midnight except aspirin and plavix with a sip of water the morning of the cath. No need to stop Suboxone.  You will need a .  Please bring your photo ID, insurance cards and either a current  list of home medications  or the medications in original bottles.  Wear comfortable loose fitting clothing.  There is a possibility of staying over night for observation so pack a bag with necessities for that.  Patient correctly repeated instructions, verbalized understanding and is agreeable to the plan.     RN called can spoke with Duran in the cath lab, cath lab is agreeable to do pre procedure labs day of with ultrasound secondary to patient being stuck several times over the weekend and leaving.

## 2024-12-26 ENCOUNTER — TELEPHONE (OUTPATIENT)
Dept: CARDIOLOGY | Facility: HOSPITAL | Age: 74
End: 2024-12-26
Payer: MEDICARE

## 2024-12-26 NOTE — TELEPHONE ENCOUNTER
Contacted Patient to change on Left and Right Heart Cath, 01/09 Procedure time 11:30 patient arrival time 10:30am. Patient verbailized understanding, and will have a .

## 2024-12-27 ENCOUNTER — HOSPITAL ENCOUNTER (OUTPATIENT)
Dept: CARDIOLOGY | Facility: HOSPITAL | Age: 74
Discharge: HOME | End: 2024-12-27
Payer: MEDICARE

## 2024-12-27 DIAGNOSIS — R06.00 DYSPNEA, UNSPECIFIED: ICD-10-CM

## 2024-12-27 DIAGNOSIS — I10 PRIMARY HYPERTENSION: ICD-10-CM

## 2024-12-27 DIAGNOSIS — I20.9 ANGINA PECTORIS, UNSPECIFIED: ICD-10-CM

## 2024-12-27 DIAGNOSIS — I20.0 ACCELERATING ANGINA (MULTI): ICD-10-CM

## 2024-12-27 DIAGNOSIS — E78.2 MIXED HYPERLIPIDEMIA: ICD-10-CM

## 2024-12-27 DIAGNOSIS — R06.09 DYSPNEA ON EXERTION: ICD-10-CM

## 2024-12-27 LAB
EJECTION FRACTION APICAL 4 CHAMBER: 70.8
EJECTION FRACTION: 64 %
GLOBAL LONGITUDINAL STRAIN: 14.8 %
LEFT ATRIUM VOLUME AREA LENGTH INDEX BSA: 19.5 ML/M2
LEFT VENTRICLE INTERNAL DIMENSION DIASTOLE: 4.45 CM (ref 3.5–6)
LEFT VENTRICULAR OUTFLOW TRACT DIAMETER: 2.17 CM
LV EJECTION FRACTION BIPLANE: 64 %
MITRAL VALVE E/A RATIO: 0.79
RIGHT VENTRICLE FREE WALL PEAK S': 8.29 CM/S
TRICUSPID ANNULAR PLANE SYSTOLIC EXCURSION: 2.1 CM

## 2024-12-27 PROCEDURE — 93306 TTE W/DOPPLER COMPLETE: CPT

## 2024-12-27 PROCEDURE — 93306 TTE W/DOPPLER COMPLETE: CPT | Performed by: STUDENT IN AN ORGANIZED HEALTH CARE EDUCATION/TRAINING PROGRAM

## 2024-12-27 PROCEDURE — 93356 MYOCRD STRAIN IMG SPCKL TRCK: CPT | Performed by: STUDENT IN AN ORGANIZED HEALTH CARE EDUCATION/TRAINING PROGRAM

## 2025-01-09 ENCOUNTER — OFFICE VISIT (OUTPATIENT)
Dept: VASCULAR SURGERY | Facility: HOSPITAL | Age: 75
End: 2025-01-09
Payer: MEDICARE

## 2025-01-09 VITALS
HEART RATE: 82 BPM | WEIGHT: 151 LBS | SYSTOLIC BLOOD PRESSURE: 101 MMHG | BODY MASS INDEX: 22.96 KG/M2 | DIASTOLIC BLOOD PRESSURE: 69 MMHG

## 2025-01-09 DIAGNOSIS — I73.9 PAD (PERIPHERAL ARTERY DISEASE) (CMS-HCC): Primary | ICD-10-CM

## 2025-01-09 PROCEDURE — 99213 OFFICE O/P EST LOW 20 MIN: CPT | Performed by: SURGERY

## 2025-01-09 PROCEDURE — 1159F MED LIST DOCD IN RCRD: CPT | Performed by: SURGERY

## 2025-01-09 PROCEDURE — 3078F DIAST BP <80 MM HG: CPT | Performed by: SURGERY

## 2025-01-09 PROCEDURE — 3074F SYST BP LT 130 MM HG: CPT | Performed by: SURGERY

## 2025-01-13 DIAGNOSIS — J45.909 ASTHMA, UNSPECIFIED ASTHMA SEVERITY, UNSPECIFIED WHETHER COMPLICATED, UNSPECIFIED WHETHER PERSISTENT (HHS-HCC): ICD-10-CM

## 2025-01-13 RX ORDER — ALBUTEROL SULFATE 90 UG/1
1-2 INHALANT RESPIRATORY (INHALATION) EVERY 4 HOURS PRN
Qty: 18 G | Refills: 2 | Status: SHIPPED | OUTPATIENT
Start: 2025-01-13

## 2025-01-17 ENCOUNTER — CLINICAL SUPPORT (OUTPATIENT)
Dept: NUTRITION | Facility: HOSPITAL | Age: 75
End: 2025-01-17
Payer: MEDICARE

## 2025-01-17 DIAGNOSIS — I20.0 ACCELERATING ANGINA (MULTI): ICD-10-CM

## 2025-01-17 DIAGNOSIS — I10 PRIMARY HYPERTENSION: ICD-10-CM

## 2025-01-17 DIAGNOSIS — E78.2 MIXED HYPERLIPIDEMIA: ICD-10-CM

## 2025-01-17 NOTE — PROGRESS NOTES
[employer]  Diet Recommendation 1: Heart Healthy  Diet Recommendation 2: Healthy Eating  Food Intolerance Avoidance: NKFA  Household Size: 1 Family Member  Interventions: Referral Number: 1st 6 Mo Referral 6 Mos  Interventions: Visit Number: 1 of 6 Visits - Max 6 Visits/Referral Each 6 Mo Period  Education Today: [employer] Meals  Grains: 25-50% Whole  Fruit: 25-50% Fresh  Vegetables: 50-75% Fresh  Proteins: 3 Plant-based Items  Dairy: 50-75% Lowfat  Originating Site of Referral Order: Cesar Alvarez MD  Initials of RD Assisting Today: [facility]

## 2025-02-04 ENCOUNTER — APPOINTMENT (OUTPATIENT)
Dept: VASCULAR SURGERY | Facility: CLINIC | Age: 75
End: 2025-02-04
Payer: MEDICARE

## 2025-02-04 ENCOUNTER — TELEPHONE (OUTPATIENT)
Dept: CARDIOLOGY | Facility: HOSPITAL | Age: 75
End: 2025-02-04

## 2025-02-04 NOTE — TELEPHONE ENCOUNTER
Spoke to patient about rescheduling his cath, he stated not now because of what he is going through with his legs and Dr. Marte.   normal...

## 2025-04-03 DIAGNOSIS — I70.212 ATHEROSCLEROSIS OF NATIVE ARTERY OF LEFT LOWER EXTREMITY WITH INTERMITTENT CLAUDICATION: ICD-10-CM

## 2025-04-03 DIAGNOSIS — J45.909 ASTHMA, UNSPECIFIED ASTHMA SEVERITY, UNSPECIFIED WHETHER COMPLICATED, UNSPECIFIED WHETHER PERSISTENT (HHS-HCC): ICD-10-CM

## 2025-04-03 DIAGNOSIS — E55.9 VITAMIN D DEFICIENCY: ICD-10-CM

## 2025-04-03 DIAGNOSIS — E78.2 MIXED HYPERLIPIDEMIA: ICD-10-CM

## 2025-04-03 DIAGNOSIS — F17.219 CIGARETTE NICOTINE DEPENDENCE WITH NICOTINE-INDUCED DISORDER: ICD-10-CM

## 2025-04-03 DIAGNOSIS — I10 PRIMARY HYPERTENSION: ICD-10-CM

## 2025-04-03 DIAGNOSIS — E53.9 VITAMIN B DEFICIENCY: ICD-10-CM

## 2025-04-03 DIAGNOSIS — J42 CHRONIC BRONCHITIS, UNSPECIFIED CHRONIC BRONCHITIS TYPE (MULTI): ICD-10-CM

## 2025-04-03 RX ORDER — AMLODIPINE BESYLATE 5 MG/1
5 TABLET ORAL DAILY
Qty: 90 TABLET | Refills: 3 | Status: SHIPPED | OUTPATIENT
Start: 2025-04-03

## 2025-04-04 ENCOUNTER — APPOINTMENT (OUTPATIENT)
Dept: PRIMARY CARE | Facility: CLINIC | Age: 75
End: 2025-04-04
Payer: MEDICARE

## 2025-04-04 VITALS
BODY MASS INDEX: 22.58 KG/M2 | HEIGHT: 68 IN | SYSTOLIC BLOOD PRESSURE: 120 MMHG | WEIGHT: 149 LBS | HEART RATE: 68 BPM | DIASTOLIC BLOOD PRESSURE: 66 MMHG

## 2025-04-04 DIAGNOSIS — J42 CHRONIC BRONCHITIS, UNSPECIFIED CHRONIC BRONCHITIS TYPE (MULTI): ICD-10-CM

## 2025-04-04 DIAGNOSIS — E55.9 VITAMIN D DEFICIENCY: ICD-10-CM

## 2025-04-04 DIAGNOSIS — F17.219 CIGARETTE NICOTINE DEPENDENCE WITH NICOTINE-INDUCED DISORDER: ICD-10-CM

## 2025-04-04 DIAGNOSIS — M54.16 LUMBAR RADICULOPATHY: ICD-10-CM

## 2025-04-04 DIAGNOSIS — I10 PRIMARY HYPERTENSION: ICD-10-CM

## 2025-04-04 DIAGNOSIS — E53.9 VITAMIN B DEFICIENCY: ICD-10-CM

## 2025-04-04 DIAGNOSIS — I70.212 ATHEROSCLEROSIS OF NATIVE ARTERY OF LEFT LOWER EXTREMITY WITH INTERMITTENT CLAUDICATION: ICD-10-CM

## 2025-04-04 DIAGNOSIS — I74.5 EMBOLISM AND THROMBOSIS OF ILIAC ARTERY (MULTI): ICD-10-CM

## 2025-04-04 DIAGNOSIS — Z00.00 MEDICARE ANNUAL WELLNESS VISIT, SUBSEQUENT: Primary | ICD-10-CM

## 2025-04-04 DIAGNOSIS — J45.909 ASTHMA, UNSPECIFIED ASTHMA SEVERITY, UNSPECIFIED WHETHER COMPLICATED, UNSPECIFIED WHETHER PERSISTENT (HHS-HCC): ICD-10-CM

## 2025-04-04 DIAGNOSIS — E78.2 MIXED HYPERLIPIDEMIA: ICD-10-CM

## 2025-04-04 DIAGNOSIS — B18.2 CHRONIC HEPATITIS C WITHOUT HEPATIC COMA (MULTI): ICD-10-CM

## 2025-04-04 PROCEDURE — 1170F FXNL STATUS ASSESSED: CPT | Performed by: CLINICAL NURSE SPECIALIST

## 2025-04-04 PROCEDURE — 3008F BODY MASS INDEX DOCD: CPT | Performed by: CLINICAL NURSE SPECIALIST

## 2025-04-04 PROCEDURE — 3074F SYST BP LT 130 MM HG: CPT | Performed by: CLINICAL NURSE SPECIALIST

## 2025-04-04 PROCEDURE — 1124F ACP DISCUSS-NO DSCNMKR DOCD: CPT | Performed by: CLINICAL NURSE SPECIALIST

## 2025-04-04 PROCEDURE — 99406 BEHAV CHNG SMOKING 3-10 MIN: CPT | Performed by: CLINICAL NURSE SPECIALIST

## 2025-04-04 PROCEDURE — 1160F RVW MEDS BY RX/DR IN RCRD: CPT | Performed by: CLINICAL NURSE SPECIALIST

## 2025-04-04 PROCEDURE — G0444 DEPRESSION SCREEN ANNUAL: HCPCS | Performed by: CLINICAL NURSE SPECIALIST

## 2025-04-04 PROCEDURE — 3078F DIAST BP <80 MM HG: CPT | Performed by: CLINICAL NURSE SPECIALIST

## 2025-04-04 PROCEDURE — G0439 PPPS, SUBSEQ VISIT: HCPCS | Performed by: CLINICAL NURSE SPECIALIST

## 2025-04-04 PROCEDURE — 99214 OFFICE O/P EST MOD 30 MIN: CPT | Performed by: CLINICAL NURSE SPECIALIST

## 2025-04-04 PROCEDURE — 1159F MED LIST DOCD IN RCRD: CPT | Performed by: CLINICAL NURSE SPECIALIST

## 2025-04-04 RX ORDER — LIDOCAINE 50 MG/G
1 PATCH TOPICAL DAILY
Qty: 30 PATCH | Refills: 2 | Status: SHIPPED | OUTPATIENT
Start: 2025-04-04 | End: 2026-04-04

## 2025-04-04 ASSESSMENT — COLUMBIA-SUICIDE SEVERITY RATING SCALE - C-SSRS
1. IN THE PAST MONTH, HAVE YOU WISHED YOU WERE DEAD OR WISHED YOU COULD GO TO SLEEP AND NOT WAKE UP?: NO
6. HAVE YOU EVER DONE ANYTHING, STARTED TO DO ANYTHING, OR PREPARED TO DO ANYTHING TO END YOUR LIFE?: NO
2. HAVE YOU ACTUALLY HAD ANY THOUGHTS OF KILLING YOURSELF?: NO

## 2025-04-04 ASSESSMENT — ENCOUNTER SYMPTOMS
ACTIVITY CHANGE: 0
MYALGIAS: 0
ARTHRALGIAS: 0
BACK PAIN: 0
SORE THROAT: 0
FLANK PAIN: 0
DEPRESSION: 0
NECK PAIN: 0
BLOOD IN STOOL: 0
SHORTNESS OF BREATH: 0
ABDOMINAL PAIN: 0
DYSURIA: 0
CONFUSION: 0
NAUSEA: 0
OCCASIONAL FEELINGS OF UNSTEADINESS: 0
EYE PAIN: 0
WHEEZING: 0
LOSS OF SENSATION IN FEET: 0
JOINT SWELLING: 0
CONSTIPATION: 0
BRUISES/BLEEDS EASILY: 0
SLEEP DISTURBANCE: 0
APPETITE CHANGE: 0
DIARRHEA: 0
DIZZINESS: 0
VOMITING: 0
CHEST TIGHTNESS: 0
COUGH: 0
TROUBLE SWALLOWING: 0
SEIZURES: 0
UNEXPECTED WEIGHT CHANGE: 0
HEADACHES: 0
HEMATURIA: 0
WOUND: 0
FATIGUE: 0
POLYDIPSIA: 0
PHOTOPHOBIA: 0
FEVER: 0
PALPITATIONS: 0
CHILLS: 0

## 2025-04-04 ASSESSMENT — ANXIETY QUESTIONNAIRES
4. TROUBLE RELAXING: NOT AT ALL
3. WORRYING TOO MUCH ABOUT DIFFERENT THINGS: NOT AT ALL
7. FEELING AFRAID AS IF SOMETHING AWFUL MIGHT HAPPEN: NOT AT ALL
GAD7 TOTAL SCORE: 0
IF YOU CHECKED OFF ANY PROBLEMS ON THIS QUESTIONNAIRE, HOW DIFFICULT HAVE THESE PROBLEMS MADE IT FOR YOU TO DO YOUR WORK, TAKE CARE OF THINGS AT HOME, OR GET ALONG WITH OTHER PEOPLE: NOT DIFFICULT AT ALL
1. FEELING NERVOUS, ANXIOUS, OR ON EDGE: NOT AT ALL
2. NOT BEING ABLE TO STOP OR CONTROL WORRYING: NOT AT ALL
5. BEING SO RESTLESS THAT IT IS HARD TO SIT STILL: NOT AT ALL
6. BECOMING EASILY ANNOYED OR IRRITABLE: NOT AT ALL

## 2025-04-04 ASSESSMENT — PATIENT HEALTH QUESTIONNAIRE - PHQ9
SUM OF ALL RESPONSES TO PHQ9 QUESTIONS 1 AND 2: 0
2. FEELING DOWN, DEPRESSED OR HOPELESS: NOT AT ALL
2. FEELING DOWN, DEPRESSED OR HOPELESS: NOT AT ALL
1. LITTLE INTEREST OR PLEASURE IN DOING THINGS: NOT AT ALL
1. LITTLE INTEREST OR PLEASURE IN DOING THINGS: NOT AT ALL
SUM OF ALL RESPONSES TO PHQ9 QUESTIONS 1 AND 2: 0

## 2025-04-04 ASSESSMENT — ACTIVITIES OF DAILY LIVING (ADL)
DRESSING: INDEPENDENT
BATHING: INDEPENDENT
TAKING_MEDICATION: INDEPENDENT
GROCERY_SHOPPING: INDEPENDENT
MANAGING_FINANCES: INDEPENDENT
DOING_HOUSEWORK: INDEPENDENT

## 2025-04-04 NOTE — ASSESSMENT & PLAN NOTE
Orders:    Follow Up In [facility] - PCP - Established; Future    lidocaine (Lidoderm) 5 % patch; Place 1 patch over 12 hours on the skin once daily. Apply to painful area 12 hours per day, remove for 12 hours.

## 2025-04-04 NOTE — ASSESSMENT & PLAN NOTE
Orders:    Follow Up In [facility] - PCP - Medicare Annual    Follow Up In [facility] - PCP - Established; Future    CT lung screening low dose; Future

## 2025-04-04 NOTE — ASSESSMENT & PLAN NOTE
Orders:    Follow Up In [facility] - PCP - Medicare Annual    Follow Up In [facility] - PCP - Established; Future    lidocaine (Lidoderm) 5 % patch; Place 1 patch over 12 hours on the skin once daily. Apply to painful area 12 hours per day, remove for 12 hours.

## 2025-04-04 NOTE — PROGRESS NOTES
Subjective   Reason for Visit: Joe Hester is an 74 y.o. male here for a Medicare Wellness visit.          Reviewed all medications by prescribing practitioner or clinical pharmacist (such as prescriptions, OTCs, herbal therapies and supplements) and documented in the medical record.    HPI      Here today for a follow up. Due for Medicare Wellness.      Previously seen with complaints of bilateral lower extremity discomfort. Following with Dr. Marte for Vascular. Referred to Dr. Bucio previously for spine evaluation of Neuropathic Pain, Bilateral lower extremities. Patient has Medical Marijuana. Procedures with Dr. Marte. Has testing scheduled for later this month with a follow up with Dr. Marte scheduled. Overall, doing well. Followed with Dr. Alvarez, recommended Heart cath, declines at this time.      Has been [facility] in [facility]. Patient states that he is down to 5 cigarettes per day, continuing to work on Cessation. Now following with Pulmonology. Chronic shortness of breath with exertion. COPD. Patient follows with Dr. Engel for Pulmonology. Inhalers adjusted.      Lisinopril, Amlodipine, Chlorthalidone, and Clonidine for his Hypertension, tolerating Rx. Has been on Suboxone, following with Counseling at [facility]. Blood pressure has been better controlled.      Trouble with Anxiety and Stress. PTSD related. Did not tolerate medications in the past. Triggered by his history of Incarceration. Hopeful that Medical Marijuana will continue to provide him some benefit. Has noted some improvement with starting the medical marijuana.      Now following with [facility] for Suboxone. Following twice a month. Suboxone dosage has been the same.     Back Pain has been worse, interested in Lidocaine Patches.     Patient Care Team:  AMANDA Mejía-CNS as PCP - General (Internal Medicine)  Shorty Clarke DO as PCP - tna Medicare Advantage PCP  Abdulkadir Engel MD as Surgeon (Pulmonary  "Disease)  Marvin Marte DO as Surgeon (Vascular Surgery)  Contreras Hoyt MD as Surgeon (Urology)     Review of Systems   Constitutional:  Negative for activity change, appetite change, chills, fatigue, fever and unexpected weight change.   HENT:  Negative for ear pain, hearing loss, nosebleeds, sore throat, tinnitus and trouble swallowing.    Eyes:  Negative for photophobia, pain and visual disturbance.   Respiratory:  Negative for cough, chest tightness, shortness of breath and wheezing.    Cardiovascular:  Negative for chest pain, palpitations and leg swelling.   Gastrointestinal:  Negative for abdominal pain, blood in stool, constipation, diarrhea, nausea and vomiting.   Endocrine: Negative for cold intolerance, heat intolerance, polydipsia and polyuria.   Genitourinary:  Negative for dysuria, flank pain and hematuria.   Musculoskeletal:  Negative for arthralgias, back pain, joint swelling, myalgias and neck pain.   Skin:  Negative for pallor, rash and wound.   Allergic/Immunologic: Negative for immunocompromised state.   Neurological:  Negative for dizziness, seizures and headaches.   Hematological:  Does not bruise/bleed easily.   Psychiatric/Behavioral:  Negative for confusion and sleep disturbance.        Objective   Vitals:  /66   Pulse 68   Ht 1.727 m (5' 8\")   Wt 67.6 kg (149 lb)   BMI 22.66 kg/m²       Physical Exam  Vitals and nursing note reviewed.   Constitutional:       General: He is not in acute distress.     Appearance: Normal appearance.   HENT:      Head: Normocephalic.      Nose: Nose normal.   Eyes:      Conjunctiva/sclera: Conjunctivae normal.   Neck:      Vascular: No carotid bruit.   Cardiovascular:      Rate and Rhythm: Normal rate and regular rhythm.      Pulses: Normal pulses.      Heart sounds: Normal heart sounds.   Pulmonary:      Effort: Pulmonary effort is normal.      Breath sounds: Normal breath sounds.   Abdominal:      General: Bowel sounds are normal.      Palpations: " Abdomen is soft.   Musculoskeletal:         General: Normal range of motion.      Cervical back: Normal range of motion.   Skin:     General: Skin is warm and dry.   Neurological:      Mental Status: He is alert and oriented to person, place, and time. Mental status is at baseline.   Psychiatric:         Mood and Affect: Mood normal.         Behavior: Behavior normal.         Assessment & Plan  Primary hypertension    Orders:    Follow Up In [facility] - PCP - Medicare Annual    Follow Up In [facility]; Future    Asthma, unspecified asthma severity, unspecified whether complicated, unspecified whether persistent ([facility])    Orders:    Follow Up In [facility] - PCP - Medicare Annual    Follow Up In [facility]; Future    Atherosclerosis of native artery of left lower extremity with intermittent claudication    Orders:    Follow Up In [facility] - PCP - Medicare Annual    Follow Up In [facility]; Future    Cigarette nicotine dependence with nicotine-induced disorder    Orders:    Follow Up In [facility] - PCP - Medicare Annual    Follow Up In [facility]; Future    CT lung screening low dose; Future    Vitamin D deficiency    Orders:    Follow Up In [facility] - PCP - Medicare Annual    Follow Up In [facility]; Future    Mixed hyperlipidemia    Orders:    Follow Up In [facility] - PCP - Medicare Annual    Follow Up In [facility]; Future    Lumbar radiculopathy    Orders:    Follow Up In [facility] - PCP - Medicare Annual    Follow Up In [facility]; Future    lidocaine (Lidoderm) 5 % patch; Place 1 patch over 12 hours on the skin once daily. Apply to painful area 12 hours per day, remove for 12 hours.    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 Apply to painful area 12 hours per day, remove for 12 hours.    Chronic hepatitis C without hepatic coma (Multi)    Orders:    Follow Up In [facility]; Future    Medicare annual wellness visit, subsequent    Orders:    Follow Up In [facility]; Future              Has an updated order for blood work to have completed.      #1. Hypertension: Blood pressure better controlled at  today. Continue Lisinopril, Clonidine, Chlorthalidone, and Norvasc. DASH diet. Ambulatory blood pressure monitoring. Lab work ordered.   #2. COPD: Chronic shortness of breath with exertion. Following with Pulmonology.   #3. Anxiety and Depression: Did not tolerate Wellbutrin. States that he was previously on multiple medications. Prozac, Xanax, Trazodone and Zoloft. Unsure of the other medications. Discontinued Lexapro. Ativan and Klonopin PRN, did not tolerate. Valium PRN effective in the past. Unable to continue to prescribe due to inconsistencies with Drug Screen. Patient aware. Now following for Medical Marijuana.   #4. Tobacco Use: Patient advised to quit smoking tobacco. The risks of smoking were reviewed with the patient and he was offered medication and/or smoking cessation counseling to help. Did not tolerate Wellbutrin. Declined any other Rx. at this time. Currently smoking 5 cigarettes/day. LDCT: June 2024, stable.  Ordered for 2025. 3 minutes were spent counseling the patient on tobacco cessation.  Benefits of cessation were discussed as well as techniques to help quit.    #5. Vitamin B12 Deficiency: Continue Vitamin B12 OTC. Reevaluate with next blood work.  #6. Vitamin D Deficiency: Reevaluate with next blood work.  #7. Hepatitis C: Completed treatment through GI previously years ago. Recent testing indicated Viral Load not detected.   #8. 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 Stenosis of Iliac Artery, PVD: Following with Dr. Marte for management/evaluation. Continue Plavix. Encouraged smoking cessation.   #10. Hyperlipidemia: Lipitor. Lifestyle changes. Reevaluate with next lab work. Followed with cardiology.   #11. Opioid Dependence: Following with Specialist for management with Suboxone.   #12. Medicare Wellness: Routine and age appropriate recommendations discussed with the patient today and patient verbalized understanding of the recommendations.  Questions answered.  Age appropriate immunizations and preventative screenings discussed with the patient and ordered as appropriate. Labs updated and ordered as indicated. Recommend healthy diet and daily exercise to maintain healthy body weight. 5 minutes  were spent screening for depression using PHQ2/PHQ9 as documented in the chart.   #13. Back Pain: Lidocaine patches ordered.      PSA Exam: May 2017. To be done with next lab work.   Colonoscopy: January 2016. Recommended to repeat in 10 years.   Medicare Wellness: April 2025.   Prevnar 20: March 2024.   COVID Vaccine: March/November 2021. Discussed Booster.  Flu Vaccine: September 2023.  Declined updated immunizations at visit today.

## 2025-04-07 ENCOUNTER — APPOINTMENT (OUTPATIENT)
Dept: PRIMARY CARE | Facility: CLINIC | Age: 75
End: 2025-04-07
Payer: MEDICARE

## 2025-04-09 ENCOUNTER — HOSPITAL ENCOUNTER (OUTPATIENT)
Dept: VASCULAR MEDICINE | Facility: HOSPITAL | Age: 75
Discharge: HOME | End: 2025-04-09
Payer: MEDICARE

## 2025-04-09 DIAGNOSIS — I73.9 PAD (PERIPHERAL ARTERY DISEASE) (CMS-HCC): ICD-10-CM

## 2025-04-09 PROCEDURE — 93925 LOWER EXTREMITY STUDY: CPT

## 2025-04-09 PROCEDURE — 93922 UPR/L XTREMITY ART 2 LEVELS: CPT

## 2025-04-09 PROCEDURE — 93925 LOWER EXTREMITY STUDY: CPT | Performed by: SURGERY

## 2025-04-09 PROCEDURE — 93922 UPR/L XTREMITY ART 2 LEVELS: CPT | Performed by: SURGERY

## 2025-04-17 ENCOUNTER — APPOINTMENT (OUTPATIENT)
Dept: VASCULAR SURGERY | Facility: HOSPITAL | Age: 75
End: 2025-04-17
Payer: MEDICARE

## 2025-04-18 ENCOUNTER — TELEPHONE (OUTPATIENT)
Dept: PRIMARY CARE | Facility: CLINIC | Age: 75
End: 2025-04-18
Payer: MEDICARE

## 2025-04-18 DIAGNOSIS — M54.16 LUMBAR RADICULOPATHY: ICD-10-CM

## 2025-04-18 DIAGNOSIS — M54.16 LUMBAR RADICULOPATHY: Primary | ICD-10-CM

## 2025-04-18 NOTE — TELEPHONE ENCOUNTER
We called to get him schedule for a Spine Dr. But he wants someone in [place]. I let him know that are spine DrLexis Merino he stated no I let him know that the referral was for his back but he again wants someone in [place]. Please advise and call him at [phone]

## 2025-04-24 ENCOUNTER — OFFICE VISIT (OUTPATIENT)
Dept: VASCULAR SURGERY | Facility: HOSPITAL | Age: 75
End: 2025-04-24
Payer: MEDICARE

## 2025-04-24 VITALS
HEART RATE: 81 BPM | WEIGHT: 148 LBS | DIASTOLIC BLOOD PRESSURE: 63 MMHG | SYSTOLIC BLOOD PRESSURE: 97 MMHG | BODY MASS INDEX: 22.5 KG/M2

## 2025-04-24 DIAGNOSIS — I74.09 AORTOILIAC OCCLUSIVE DISEASE (MULTI): Primary | ICD-10-CM

## 2025-04-24 PROCEDURE — 99213 OFFICE O/P EST LOW 20 MIN: CPT | Performed by: SURGERY

## 2025-04-24 PROCEDURE — 3078F DIAST BP <80 MM HG: CPT | Performed by: SURGERY

## 2025-04-24 PROCEDURE — 1159F MED LIST DOCD IN RCRD: CPT | Performed by: SURGERY

## 2025-04-24 PROCEDURE — 3074F SYST BP LT 130 MM HG: CPT | Performed by: SURGERY

## 2025-04-24 NOTE — PROGRESS NOTES
Subjective   Patient ID: Joe Hester is a 74 y.o. male who presents for Follow-up (3 mo fuv /PAD ).  HPI  Patient is here for follow-up secondary to bilateral lower extremity leg claudication short distance less than 100 yards  States the right side slightly worse than left  At this time no active ulcers no rest pain noted    Review of Systems  Review of Systems    Constitutional:  no generalized malaise overall feels well, energy levels intact, no complaints specifically noted  HEENT:  No blurry vision, no visual aides noted, no hearing loss no ear ache no nose bleeds noted, no dysphagia, no congestion otherwise no pertinent positives noted  Cardiovascular:  no palpitations, chest pain or heaviness noted, no leg swelling, no numbness or tingling in the lower extremity noted  Respiratory:  no shortness of breath, no productive cough noted, no conversation dyspnea or difficulty breathing  Gastrointestinal:  no abdominal pain, no nausea or vomiting, appetite intact, no bowel irregularities noted  Genitourinary:   no urinary incontinence, frequency or urgency issues noted, no hematuria or burning sensation issues  Musculoskeletal:  No muscle aches or pains, no joint discomfort noted, no back pain noted otherwise feels well  Skin: no ulcerations, skin color issues or wounds upper or lower extremities  Neurologic: no dizziness, no hemiplegia, no hemiparesis, no obvious visual deficits noted  Psychiatric: no depression, no memory loss noted, no suicidal ideation  Endocrine: no weight loss or gain, no temperature concerns hot or cold intolerance  Hemogolotic/Lymphatic: no bruising, excessive bleeding, no swelling in the groins or neck noted  Short distance claudication  States that status post angioplasty was improved and at this point back to essentially baseline since prior to angioplasty    Objective   Physical Exam  Physical exam    Constitutional: alert and in no acute distress verbal  Eyes: No erythema swelling or  discharge noted  Neck: supple, symmetric, trachea midline, no masses noted  Cardiovascular: Carotid pulses 2+, no obvious bruit, no Jugular distension noted, no thrill, heart regular rate, lower extremity vascular exam intact, cap refill <2 sec  Pulmonary:  Bilateral breath sounds intact, clear with rales rhonchi or wheeze  Abdomen: soft non tender, no pulsatile masses noted, no rebound rigidity or guarding noted  Skin: intact warm no abnormal turgor  Psychiatric: alert without any obvious cognitive issues, oriented to person, place, and time    Assessment/Plan   Aortoiliac occlusive disease  Status post femorofemoral bypass  Status post right common femoral artery angioplasty  Noninvasive imaging demonstrates slightly diminished VKNG on the right compared to last test 6 months ago  Repeat CTA aorta with bilateral runoffs  Call with results follow-up after testing         Marvin Marte DO 04/24/25 10:05 AM

## 2025-04-25 LAB
ALBUMIN SERPL-MCNC: 4.6 G/DL (ref 3.6–5.1)
BUN SERPL-MCNC: 27 MG/DL (ref 7–25)
BUN/CREAT SERPL: 20 (CALC) (ref 6–22)
CALCIUM SERPL-MCNC: 9.5 MG/DL (ref 8.6–10.3)
CHLORIDE SERPL-SCNC: 105 MMOL/L (ref 98–110)
CO2 SERPL-SCNC: 27 MMOL/L (ref 20–32)
CREAT SERPL-MCNC: 1.36 MG/DL (ref 0.7–1.28)
EGFRCR SERPLBLD CKD-EPI 2021: 55 ML/MIN/1.73M2
GLUCOSE SERPL-MCNC: 131 MG/DL (ref 65–99)
PHOSPHATE SERPL-MCNC: 3.7 MG/DL (ref 2.1–4.3)
POTASSIUM SERPL-SCNC: 4.5 MMOL/L (ref 3.5–5.3)
SODIUM SERPL-SCNC: 141 MMOL/L (ref 135–146)

## 2025-05-05 ENCOUNTER — TELEPHONE (OUTPATIENT)
Dept: VASCULAR SURGERY | Facility: HOSPITAL | Age: 75
End: 2025-05-05
Payer: MEDICARE

## 2025-05-05 ENCOUNTER — TELEPHONE (OUTPATIENT)
Dept: PRIMARY CARE | Facility: CLINIC | Age: 75
End: 2025-05-05
Payer: MEDICARE

## 2025-05-05 NOTE — TELEPHONE ENCOUNTER
Patient states his behavioral doctor says he should follow up with pcp because he has bad kidneys  [phone]

## 2025-05-06 DIAGNOSIS — I74.09 AORTOILIAC OCCLUSIVE DISEASE (MULTI): Primary | ICD-10-CM

## 2025-05-06 RX ORDER — ALPRAZOLAM 1 MG/1
1 TABLET ORAL NIGHTLY PRN
Qty: 7 TABLET | Refills: 0 | Status: SHIPPED | OUTPATIENT
Start: 2025-05-06 | End: 2025-05-13

## 2025-05-08 ENCOUNTER — HOSPITAL ENCOUNTER (OUTPATIENT)
Dept: RADIOLOGY | Facility: HOSPITAL | Age: 75
Discharge: HOME | End: 2025-05-08
Payer: MEDICARE

## 2025-05-08 ENCOUNTER — APPOINTMENT (OUTPATIENT)
Dept: RADIOLOGY | Facility: CLINIC | Age: 75
End: 2025-05-08
Payer: MEDICARE

## 2025-05-08 DIAGNOSIS — I74.09 AORTOILIAC OCCLUSIVE DISEASE (MULTI): ICD-10-CM

## 2025-05-08 PROCEDURE — 75635 CT ANGIO ABDOMINAL ARTERIES: CPT

## 2025-05-08 PROCEDURE — 2550000001 HC RX 255 CONTRASTS: Mod: JZ | Performed by: SURGERY

## 2025-05-08 RX ADMIN — IOHEXOL 100 ML: 350 INJECTION, SOLUTION INTRAVENOUS at 13:32

## 2025-05-12 ENCOUNTER — TELEPHONE (OUTPATIENT)
Dept: CARDIOLOGY | Facility: HOSPITAL | Age: 75
End: 2025-05-12
Payer: MEDICARE

## 2025-05-12 ENCOUNTER — TELEPHONE (OUTPATIENT)
Dept: VASCULAR SURGERY | Facility: HOSPITAL | Age: 75
End: 2025-05-12
Payer: MEDICARE

## 2025-05-12 NOTE — TELEPHONE ENCOUNTER
Pt called in to schedule an appt w/  I told him we do not schedule  appts but I can give the number to his office Phone call got disconnected

## 2025-05-20 DIAGNOSIS — I73.9 PERIPHERAL VASCULAR DISEASE: ICD-10-CM

## 2025-05-20 DIAGNOSIS — I73.9 PAD (PERIPHERAL ARTERY DISEASE): Primary | ICD-10-CM

## 2025-05-20 NOTE — TELEPHONE ENCOUNTER
Patient called in and I spoke with him at this time and would like to refill his     clopidogrel (Plavix) 75 mg tablet     And ALPRAZolam (Xanax) 1 mg tablet     To go to     [id number] - [place], [place] - [address]  [address] [address]  Phone: [phone]  Fax: [phone]     He wanted to hear his results again so I transferred his call to Dr. Marte's office.    States he is all out of his plavix.    Patient expressed understanding.    Routing to Belén SON for support.    Thank you!  Benny GARCIA

## 2025-05-21 RX ORDER — CLOPIDOGREL BISULFATE 75 MG/1
75 TABLET ORAL DAILY
Qty: 90 TABLET | Refills: 0 | OUTPATIENT
Start: 2025-05-21

## 2025-05-21 RX ORDER — CLOPIDOGREL BISULFATE 75 MG/1
75 TABLET ORAL DAILY
Qty: 90 TABLET | Refills: 3 | Status: SHIPPED | OUTPATIENT
Start: 2025-05-21

## 2025-06-12 ENCOUNTER — HOSPITAL ENCOUNTER (EMERGENCY)
Facility: HOSPITAL | Age: 75
Discharge: HOME | End: 2025-06-12
Attending: STUDENT IN AN ORGANIZED HEALTH CARE EDUCATION/TRAINING PROGRAM
Payer: MEDICARE

## 2025-06-12 ENCOUNTER — APPOINTMENT (OUTPATIENT)
Dept: RADIOLOGY | Facility: HOSPITAL | Age: 75
End: 2025-06-12
Payer: MEDICARE

## 2025-06-12 VITALS
OXYGEN SATURATION: 98 % | BODY MASS INDEX: 23.64 KG/M2 | TEMPERATURE: 98.5 F | DIASTOLIC BLOOD PRESSURE: 80 MMHG | RESPIRATION RATE: 16 BRPM | WEIGHT: 156 LBS | HEART RATE: 74 BPM | HEIGHT: 68 IN | SYSTOLIC BLOOD PRESSURE: 122 MMHG

## 2025-06-12 DIAGNOSIS — R10.9 FLANK PAIN: ICD-10-CM

## 2025-06-12 DIAGNOSIS — R79.89 ELEVATED SERUM CREATININE: Primary | ICD-10-CM

## 2025-06-12 LAB
ALBUMIN SERPL BCP-MCNC: 4.9 G/DL (ref 3.4–5)
ALP SERPL-CCNC: 38 U/L (ref 33–136)
ALT SERPL W P-5'-P-CCNC: 11 U/L (ref 10–52)
ANION GAP SERPL CALC-SCNC: 13 MMOL/L (ref 10–20)
APPEARANCE UR: CLEAR
AST SERPL W P-5'-P-CCNC: 17 U/L (ref 9–39)
BASOPHILS # BLD AUTO: 0.06 X10*3/UL (ref 0–0.1)
BASOPHILS NFR BLD AUTO: 0.7 %
BILIRUB SERPL-MCNC: 0.6 MG/DL (ref 0–1.2)
BILIRUB UR STRIP.AUTO-MCNC: NEGATIVE MG/DL
BUN SERPL-MCNC: 35 MG/DL (ref 6–23)
CALCIUM SERPL-MCNC: 9.4 MG/DL (ref 8.6–10.3)
CHLORIDE SERPL-SCNC: 103 MMOL/L (ref 98–107)
CO2 SERPL-SCNC: 23 MMOL/L (ref 21–32)
COLOR UR: YELLOW
CREAT SERPL-MCNC: 1.77 MG/DL (ref 0.5–1.3)
EGFRCR SERPLBLD CKD-EPI 2021: 40 ML/MIN/1.73M*2
EOSINOPHIL # BLD AUTO: 0.34 X10*3/UL (ref 0–0.4)
EOSINOPHIL NFR BLD AUTO: 3.8 %
ERYTHROCYTE [DISTWIDTH] IN BLOOD BY AUTOMATED COUNT: 13 % (ref 11.5–14.5)
GLUCOSE SERPL-MCNC: 117 MG/DL (ref 74–99)
GLUCOSE UR STRIP.AUTO-MCNC: NORMAL MG/DL
HCT VFR BLD AUTO: 38.7 % (ref 41–52)
HGB BLD-MCNC: 13.2 G/DL (ref 13.5–17.5)
IMM GRANULOCYTES # BLD AUTO: 0.03 X10*3/UL (ref 0–0.5)
IMM GRANULOCYTES NFR BLD AUTO: 0.3 % (ref 0–0.9)
KETONES UR STRIP.AUTO-MCNC: NEGATIVE MG/DL
LACTATE SERPL-SCNC: 0.9 MMOL/L (ref 0.4–2)
LEUKOCYTE ESTERASE UR QL STRIP.AUTO: NEGATIVE
LIPASE SERPL-CCNC: 20 U/L (ref 9–82)
LYMPHOCYTES # BLD AUTO: 2.72 X10*3/UL (ref 0.8–3)
LYMPHOCYTES NFR BLD AUTO: 30.8 %
MCH RBC QN AUTO: 31.7 PG (ref 26–34)
MCHC RBC AUTO-ENTMCNC: 34.1 G/DL (ref 32–36)
MCV RBC AUTO: 93 FL (ref 80–100)
MONOCYTES # BLD AUTO: 0.57 X10*3/UL (ref 0.05–0.8)
MONOCYTES NFR BLD AUTO: 6.4 %
NEUTROPHILS # BLD AUTO: 5.12 X10*3/UL (ref 1.6–5.5)
NEUTROPHILS NFR BLD AUTO: 58 %
NITRITE UR QL STRIP.AUTO: NEGATIVE
NRBC BLD-RTO: 0 /100 WBCS (ref 0–0)
PH UR STRIP.AUTO: 5 [PH]
PLATELET # BLD AUTO: 202 X10*3/UL (ref 150–450)
POTASSIUM SERPL-SCNC: 4.4 MMOL/L (ref 3.5–5.3)
PROT SERPL-MCNC: 7.3 G/DL (ref 6.4–8.2)
PROT UR STRIP.AUTO-MCNC: NEGATIVE MG/DL
RBC # BLD AUTO: 4.17 X10*6/UL (ref 4.5–5.9)
RBC # UR STRIP.AUTO: NEGATIVE MG/DL
SODIUM SERPL-SCNC: 135 MMOL/L (ref 136–145)
SP GR UR STRIP.AUTO: 1.03
UROBILINOGEN UR STRIP.AUTO-MCNC: NORMAL MG/DL
WBC # BLD AUTO: 8.8 X10*3/UL (ref 4.4–11.3)

## 2025-06-12 PROCEDURE — 85025 COMPLETE CBC W/AUTO DIFF WBC: CPT | Performed by: PHYSICIAN ASSISTANT

## 2025-06-12 PROCEDURE — 83690 ASSAY OF LIPASE: CPT | Performed by: PHYSICIAN ASSISTANT

## 2025-06-12 PROCEDURE — 99285 EMERGENCY DEPT VISIT HI MDM: CPT | Mod: 25 | Performed by: STUDENT IN AN ORGANIZED HEALTH CARE EDUCATION/TRAINING PROGRAM

## 2025-06-12 PROCEDURE — 36415 COLL VENOUS BLD VENIPUNCTURE: CPT | Performed by: PHYSICIAN ASSISTANT

## 2025-06-12 PROCEDURE — 72131 CT LUMBAR SPINE W/O DYE: CPT | Performed by: RADIOLOGY

## 2025-06-12 PROCEDURE — 74177 CT ABD & PELVIS W/CONTRAST: CPT

## 2025-06-12 PROCEDURE — 80053 COMPREHEN METABOLIC PANEL: CPT | Performed by: PHYSICIAN ASSISTANT

## 2025-06-12 PROCEDURE — 83605 ASSAY OF LACTIC ACID: CPT | Performed by: PHYSICIAN ASSISTANT

## 2025-06-12 PROCEDURE — 81003 URINALYSIS AUTO W/O SCOPE: CPT | Performed by: PHYSICIAN ASSISTANT

## 2025-06-12 PROCEDURE — 74160 CT ABDOMEN W/CONTRAST: CPT | Performed by: RADIOLOGY

## 2025-06-12 PROCEDURE — 2550000001 HC RX 255 CONTRASTS: Performed by: PHYSICIAN ASSISTANT

## 2025-06-12 PROCEDURE — 2500000004 HC RX 250 GENERAL PHARMACY W/ HCPCS (ALT 636 FOR OP/ED): Performed by: PHYSICIAN ASSISTANT

## 2025-06-12 PROCEDURE — 96360 HYDRATION IV INFUSION INIT: CPT | Performed by: PHYSICIAN ASSISTANT

## 2025-06-12 PROCEDURE — 72131 CT LUMBAR SPINE W/O DYE: CPT

## 2025-06-12 RX ORDER — ACETAMINOPHEN 500 MG
1000 TABLET ORAL EVERY 6 HOURS PRN
Qty: 30 TABLET | Refills: 0 | Status: SHIPPED | OUTPATIENT
Start: 2025-06-12 | End: 2025-06-22

## 2025-06-12 RX ORDER — LIDOCAINE 560 MG/1
1 PATCH PERCUTANEOUS; TOPICAL; TRANSDERMAL DAILY
Qty: 5 PATCH | Refills: 0 | Status: SHIPPED | OUTPATIENT
Start: 2025-06-12

## 2025-06-12 RX ORDER — METHOCARBAMOL 500 MG/1
500 TABLET, FILM COATED ORAL 2 TIMES DAILY
Qty: 20 TABLET | Refills: 0 | Status: SHIPPED | OUTPATIENT
Start: 2025-06-12 | End: 2025-06-16 | Stop reason: SDUPTHER

## 2025-06-12 RX ADMIN — IOHEXOL 75 ML: 350 INJECTION, SOLUTION INTRAVENOUS at 18:53

## 2025-06-12 RX ADMIN — SODIUM CHLORIDE 1000 ML: 0.9 INJECTION, SOLUTION INTRAVENOUS at 19:32

## 2025-06-12 ASSESSMENT — LIFESTYLE VARIABLES
HAVE YOU EVER FELT YOU SHOULD CUT DOWN ON YOUR DRINKING: NO
HAVE PEOPLE ANNOYED YOU BY CRITICIZING YOUR DRINKING: NO
TOTAL SCORE: 0
EVER FELT BAD OR GUILTY ABOUT YOUR DRINKING: NO
EVER HAD A DRINK FIRST THING IN THE MORNING TO STEADY YOUR NERVES TO GET RID OF A HANGOVER: NO

## 2025-06-12 ASSESSMENT — PAIN DESCRIPTION - PAIN TYPE: TYPE: ACUTE PAIN

## 2025-06-12 ASSESSMENT — PAIN DESCRIPTION - ORIENTATION: ORIENTATION: LEFT;RIGHT

## 2025-06-12 ASSESSMENT — PAIN - FUNCTIONAL ASSESSMENT: PAIN_FUNCTIONAL_ASSESSMENT: 0-10

## 2025-06-12 ASSESSMENT — PAIN DESCRIPTION - DESCRIPTORS: DESCRIPTORS: ACHING

## 2025-06-12 ASSESSMENT — PAIN SCALES - GENERAL: PAINLEVEL_OUTOF10: 8

## 2025-06-12 NOTE — ED PROVIDER NOTES
EMERGENCY MEDICINE EVALUATION NOTE    History of Present Illness     Chief Complaint:   Chief Complaint   Patient presents with    bilateral flank pain       HPI: Joe Hester is a 74 y.o. male presents with a chief complaint of bilateral flank pain.  Patient reports has been going on for weeks to months.  He states he came in today because he physic the pain is getting a little worse and he feels like his flanks are swollen on both sides.  Patient reports that anything that he does causes pain to bilateral flanks to be worsened.  Reports that he has worsened pain with walking twisting or any type of movement.  He denies any urinary symptoms dysuria frequency and denies any hematuria.  Patient denies any associated chest pain or shortness of breath.  Patient does report that he has a history of a femoropopliteal bypass and it was recently evaluated by his vascular surgeon.  States in the time that they noted that his creatinine was starting to go up.    Previous History   Medical History[1]  Surgical History[2]  Social History[3]  Family History[4]  Allergies[5]  Current Outpatient Medications   Medication Instructions    albuterol 90 mcg/actuation inhaler 1-2 puffs, inhalation, Every 4 hours PRN    ALPRAZolam (XANAX) 1 mg, oral, Nightly PRN    amLODIPine (NORVASC) 5 mg, oral, Daily    aspirin 81 mg, oral, Daily    atorvastatin (LIPITOR) 10 mg, oral, Daily    buprenorphine-naloxone (Suboxone) 8-2 mg SL film 1 Film, 2 times daily    chlorthalidone (HYGROTON) 25 mg, oral, Daily    cloNIDine (CATAPRES) 0.1 mg, oral, Daily    clopidogrel (PLAVIX) 75 mg, oral, Daily    ipratropium-albuteroL (Duo-Neb) 0.5-2.5 mg/3 mL nebulizer solution 3 mL, nebulization, 3 times daily RT, 1 unit dose    lidocaine (Lidoderm) 5 % patch 1 patch, transdermal, Daily, Apply to painful area 12 hours per day, remove for 12 hours.    lisinopril 40 mg, oral, Daily    naloxone (NARCAN) 4 mg    tiotropium-olodateroL (Stiolto Respimat) 2.5-2.5  mcg/actuation mist inhaler 2 puffs, Daily       Physical Exam     Appearance: Alert, oriented , cooperative,  in no acute distress.      Skin: Intact,  dry skin, no lesions, rash, petechiae or purpura.      Eyes: PERRLA, EOMs intact,  Conjunctiva pink      ENT: Hearing grossly intact. Pharynx clear, uvula midline.      Neck: Supple. Trachea at midline.      Pulmonary: Clear bilaterally. No rales, rhonchi or wheezing. No accessory muscle use or stridor.     Cardiac: Normal rate and rhythm without murmur     Abdomen: Soft, nontender, active bowel sounds.  No CVA tenderness.     Musculoskeletal: Full range of motion.  Normal gait.  Bilateral lumbar paraspinal tenderness over buttock in the low lumbar spine.  No midline C, T, or L-spine tenderness.     Neurological:Cranial nerves II through XII are grossly intact, normal sensation, no weakness, no focal findings identified.     Results     Labs Reviewed   CBC WITH AUTO DIFFERENTIAL - Abnormal       Result Value    WBC 8.8      nRBC 0.0      RBC 4.17 (*)     Hemoglobin 13.2 (*)     Hematocrit 38.7 (*)     MCV 93      MCH 31.7      MCHC 34.1      RDW 13.0      Platelets 202      Neutrophils % 58.0      Immature Granulocytes %, Automated 0.3      Lymphocytes % 30.8      Monocytes % 6.4      Eosinophils % 3.8      Basophils % 0.7      Neutrophils Absolute 5.12      Immature Granulocytes Absolute, Automated 0.03      Lymphocytes Absolute 2.72      Monocytes Absolute 0.57      Eosinophils Absolute 0.34      Basophils Absolute 0.06     COMPREHENSIVE METABOLIC PANEL - Abnormal    Glucose 117 (*)     Sodium 135 (*)     Potassium 4.4      Chloride 103      Bicarbonate 23      Anion Gap 13      Urea Nitrogen 35 (*)     Creatinine 1.77 (*)     eGFR 40 (*)     Calcium 9.4      Albumin 4.9      Alkaline Phosphatase 38      Total Protein 7.3      AST 17      Bilirubin, Total 0.6      ALT 11     LIPASE - Normal    Lipase 20      Narrative:     Venipuncture immediately after or during  the administration of Metamizole may lead to falsely low results. Testing should be performed immediately prior to Metamizole dosing.   LACTATE - Normal    Lactate 0.9      Narrative:     Venipuncture immediately after or during the administration of Metamizole may lead to falsely low results. Testing should be performed immediately prior to Metamizole dosing.   URINALYSIS WITH REFLEX CULTURE AND MICROSCOPIC - Normal    Color, Urine Yellow      Appearance, Urine Clear      Specific Gravity, Urine 1.026      pH, Urine 5.0      Protein, Urine NEGATIVE      Glucose, Urine Normal      Blood, Urine NEGATIVE      Ketones, Urine NEGATIVE      Bilirubin, Urine NEGATIVE      Urobilinogen, Urine Normal      Nitrite, Urine NEGATIVE      Leukocyte Esterase, Urine NEGATIVE     URINALYSIS WITH REFLEX CULTURE AND MICROSCOPIC    Narrative:     The following orders were created for panel order Urinalysis with Reflex Culture and Microscopic.  Procedure                               Abnormality         Status                     ---------                               -----------         ------                     Urinalysis with Reflex C...[[id number]]  Normal              Final result               Extra Urine Gray Tube[[id number]]                            In process                   Please view results for these tests on the individual orders.   EXTRA URINE GRAY TUBE     CT lumbar spine wo IV contrast   Final Result   No acute abdominal or pelvic process.        No acute fracture or traumatic malalignment of the lumbar spine.   Degenerative changes as above.        MACRO:   None        Signed by: Alicia Negrete 6/12/2025 7:50 PM   Dictation workstation:   [id number]      CT abdomen pelvis w IV contrast   Final Result   No acute abdominal or pelvic process.        No acute fracture or traumatic malalignment of the lumbar spine.   Degenerative changes as above.        MACRO:   None        Signed by: Alicia Negrete 6/12/2025 7:50 PM  "  Dictation workstation:   [id number]            ED Course & Medical Decision Making     Medications   iohexol (OMNIPaque) 350 mg iodine/mL solution 75 mL (75 mL intravenous Given 6/12/25 1853)   sodium chloride 0.9 % bolus 1,000 mL (1,000 mL intravenous New Bag 6/12/25 1932)     Heart Rate:  [77]   Temperature:  [36.9 °C (98.5 °F)]   Respirations:  [18]   BP: (120)/(75)   Height:  [172.7 cm (5' 8\")]   Weight:  [70.8 kg (156 lb)]    ED Course as of 06/12/25 2035   Thu Jun 12, 2025 2033 Patient's CBC did not show any significant abnormalities today CMP was within normal limits other than the elevated creatinine.  Which appears to be slowly trending upward and his GFR appears to be slowly trending downward.  Urinalysis not show any acute abnormalities.  CT imaging did not show any acute abnormalities to explain patient's pain.  Due to patient's creatinine elevation patient will be referred to nephrology but this time I do not think is elevated enough to bring him to the hospital.  Patient was seen and evaluated by attending edition agrees with plan of care.  Patient agrees to the plan of care of discharge.  He was encouraged to follow-up with primary care provider as well as nephrology with information provided. [CJ]      ED Course User Index  [CJ] Codey Dahl PA-C         Diagnoses as of 06/12/25 2035   Elevated serum creatinine   Flank pain       Procedures   Procedures    Diagnosis     1. Elevated serum creatinine    2. Flank pain        Disposition   Discharged    ED Prescriptions    None         Disclaimer: This note was dictated by speech recognition. Minor errors in transcription may be present. Please call if questions.         [1]   Past Medical History:  Diagnosis Date    COPD (chronic obstructive pulmonary disease) (Multi)     Easy bruising     Full dentures     HL (hearing loss)     left ear    Hyperlipidemia     Hypertension     Opioid dependence, in remission 11/21/2014    Opioid type dependence in " "remission    Personal history of other diseases of the circulatory system 01/21/2020    History of intermittent claudication    Personal history of other mental and behavioral disorders     History of depression    PTSD (post-traumatic stress disorder)     needle phobia    Shortness of breath     Vision loss    [2]   Past Surgical History:  Procedure Laterality Date    CATARACT EXTRACTION W/  INTRAOCULAR LENS IMPLANT Bilateral     CHOLECYSTECTOMY  09/21/2017    Cholecystectomy    CT ANGIO AORTA AND BILATERAL ILIOFEMORAL RUN OFF INCLUDING WITHOUT CONTRAST IF PERFORMED  01/29/2021    CT AORTA AND BILATERAL ILIOFEMORAL RUNOFF ANGIOGRAM W AND/OR WO IV CONTRAST 1/29/2021 [facility]    CT ANGIO AORTA AND BILATERAL ILIOFEMORAL RUN OFF INCLUDING WITHOUT CONTRAST IF PERFORMED  01/17/2023    CT AORTA AND BILATERAL ILIOFEMORAL RUNOFF ANGIOGRAM W AND/OR WO IV CONTRAST 1/17/2023 [facility]    OTHER SURGICAL HISTORY  01/21/2020    Leg surgery    OTHER SURGICAL HISTORY  10/21/2014    Surgery    OTHER SURGICAL HISTORY  07/13/2020    Angioplasty   [3]   Social History  Tobacco Use    Smoking status: Every Day     Current packs/day: 0.25     Average packs/day: 0.3 packs/day for 59.0 years (14.8 ttl pk-yrs)     Types: Cigarettes    Smokeless tobacco: Never   Vaping Use    Vaping status: Never Used   Substance Use Topics    Alcohol use: Not Currently    Drug use: Yes     Types: Marijuana     Comment: medical prescription.   [4]   Family History  Problem Relation Name Age of Onset    Emphysema Father     [5]   Allergies  Allergen Reactions    Fentanyl Other     Pt on Suboxone    Gabapentin Other     \"Messes with vision\"    Montelukast Other and Headache     Eye pain         Codey Dahl PA-C  06/12/25 2035    "

## 2025-06-13 LAB — HOLD SPECIMEN: NORMAL

## 2025-06-16 ENCOUNTER — APPOINTMENT (OUTPATIENT)
Dept: PRIMARY CARE | Facility: CLINIC | Age: 75
End: 2025-06-16
Payer: MEDICARE

## 2025-06-16 VITALS
SYSTOLIC BLOOD PRESSURE: 100 MMHG | BODY MASS INDEX: 21.98 KG/M2 | DIASTOLIC BLOOD PRESSURE: 60 MMHG | WEIGHT: 145 LBS | HEART RATE: 66 BPM | HEIGHT: 68 IN

## 2025-06-16 DIAGNOSIS — N17.9 AKI (ACUTE KIDNEY INJURY): ICD-10-CM

## 2025-06-16 DIAGNOSIS — N28.1 RENAL CYST, ACQUIRED: ICD-10-CM

## 2025-06-16 DIAGNOSIS — M48.061 SPINAL STENOSIS OF LUMBAR REGION, UNSPECIFIED WHETHER NEUROGENIC CLAUDICATION PRESENT: ICD-10-CM

## 2025-06-16 DIAGNOSIS — N28.9 RENAL INSUFFICIENCY: Primary | ICD-10-CM

## 2025-06-16 DIAGNOSIS — R10.9 FLANK PAIN: ICD-10-CM

## 2025-06-16 PROCEDURE — 99214 OFFICE O/P EST MOD 30 MIN: CPT | Performed by: CLINICAL NURSE SPECIALIST

## 2025-06-16 PROCEDURE — 1160F RVW MEDS BY RX/DR IN RCRD: CPT | Performed by: CLINICAL NURSE SPECIALIST

## 2025-06-16 PROCEDURE — 3074F SYST BP LT 130 MM HG: CPT | Performed by: CLINICAL NURSE SPECIALIST

## 2025-06-16 PROCEDURE — 1159F MED LIST DOCD IN RCRD: CPT | Performed by: CLINICAL NURSE SPECIALIST

## 2025-06-16 PROCEDURE — 3078F DIAST BP <80 MM HG: CPT | Performed by: CLINICAL NURSE SPECIALIST

## 2025-06-16 PROCEDURE — 3008F BODY MASS INDEX DOCD: CPT | Performed by: CLINICAL NURSE SPECIALIST

## 2025-06-16 RX ORDER — METHOCARBAMOL 500 MG/1
500 TABLET, FILM COATED ORAL 2 TIMES DAILY
Qty: 20 TABLET | Refills: 0 | Status: SHIPPED | OUTPATIENT
Start: 2025-06-16 | End: 2025-06-26

## 2025-06-16 ASSESSMENT — ENCOUNTER SYMPTOMS
PHOTOPHOBIA: 0
BACK PAIN: 0
JOINT SWELLING: 0
COUGH: 0
SLEEP DISTURBANCE: 0
OCCASIONAL FEELINGS OF UNSTEADINESS: 0
NECK PAIN: 0
FLANK PAIN: 0
ARTHRALGIAS: 0
DYSURIA: 0
MYALGIAS: 0
EYE PAIN: 0
FATIGUE: 0
FEVER: 0
SHORTNESS OF BREATH: 0
DEPRESSION: 0
TROUBLE SWALLOWING: 0
ACTIVITY CHANGE: 0
LOSS OF SENSATION IN FEET: 0
BLOOD IN STOOL: 0
CHILLS: 0
UNEXPECTED WEIGHT CHANGE: 0
SORE THROAT: 0
APPETITE CHANGE: 0
HEADACHES: 0
DIZZINESS: 0
CHEST TIGHTNESS: 0
HEMATURIA: 0
SEIZURES: 0
WOUND: 0
POLYDIPSIA: 0
NAUSEA: 0
DIARRHEA: 0
PALPITATIONS: 0
ABDOMINAL PAIN: 0
VOMITING: 0
CONSTIPATION: 0
BRUISES/BLEEDS EASILY: 0
CONFUSION: 0
WHEEZING: 0

## 2025-06-16 ASSESSMENT — PATIENT HEALTH QUESTIONNAIRE - PHQ9
SUM OF ALL RESPONSES TO PHQ9 QUESTIONS 1 AND 2: 0
2. FEELING DOWN, DEPRESSED OR HOPELESS: NOT AT ALL
1. LITTLE INTEREST OR PLEASURE IN DOING THINGS: NOT AT ALL

## 2025-06-18 ENCOUNTER — OFFICE VISIT (OUTPATIENT)
Dept: VASCULAR SURGERY | Facility: HOSPITAL | Age: 75
End: 2025-06-18
Payer: MEDICARE

## 2025-06-18 VITALS
WEIGHT: 143 LBS | HEIGHT: 68 IN | RESPIRATION RATE: 18 BRPM | DIASTOLIC BLOOD PRESSURE: 71 MMHG | HEART RATE: 87 BPM | BODY MASS INDEX: 21.67 KG/M2 | SYSTOLIC BLOOD PRESSURE: 113 MMHG

## 2025-06-18 DIAGNOSIS — I79.8 OTHER DISORDERS OF ARTERIES, ARTERIOLES AND CAPILLARIES IN DISEASES CLASSIFIED ELSEWHERE: ICD-10-CM

## 2025-06-18 DIAGNOSIS — H53.9 VISUAL DISTURBANCES: Primary | ICD-10-CM

## 2025-06-18 DIAGNOSIS — Z01.810 PREOP CARDIOVASCULAR EXAM: ICD-10-CM

## 2025-06-18 DIAGNOSIS — I73.9 PAD (PERIPHERAL ARTERY DISEASE): ICD-10-CM

## 2025-06-18 PROCEDURE — 3008F BODY MASS INDEX DOCD: CPT | Performed by: SURGERY

## 2025-06-18 PROCEDURE — 99215 OFFICE O/P EST HI 40 MIN: CPT | Performed by: SURGERY

## 2025-06-18 PROCEDURE — 1125F AMNT PAIN NOTED PAIN PRSNT: CPT | Performed by: SURGERY

## 2025-06-18 PROCEDURE — 1159F MED LIST DOCD IN RCRD: CPT | Performed by: SURGERY

## 2025-06-18 PROCEDURE — 3074F SYST BP LT 130 MM HG: CPT | Performed by: SURGERY

## 2025-06-18 PROCEDURE — 99205 OFFICE O/P NEW HI 60 MIN: CPT | Performed by: SURGERY

## 2025-06-18 PROCEDURE — 3078F DIAST BP <80 MM HG: CPT | Performed by: SURGERY

## 2025-06-18 ASSESSMENT — PAIN SCALES - GENERAL: PAINLEVEL_OUTOF10: 7

## 2025-06-19 ENCOUNTER — OFFICE VISIT (OUTPATIENT)
Dept: CARDIOLOGY | Facility: HOSPITAL | Age: 75
End: 2025-06-19
Payer: MEDICARE

## 2025-06-19 VITALS
HEART RATE: 60 BPM | BODY MASS INDEX: 21.67 KG/M2 | WEIGHT: 143 LBS | SYSTOLIC BLOOD PRESSURE: 110 MMHG | DIASTOLIC BLOOD PRESSURE: 70 MMHG | HEIGHT: 68 IN

## 2025-06-19 DIAGNOSIS — R07.89 OTHER CHEST PAIN: ICD-10-CM

## 2025-06-19 DIAGNOSIS — Z01.810 PREOPERATIVE CARDIOVASCULAR EXAMINATION: ICD-10-CM

## 2025-06-19 DIAGNOSIS — I73.9 CLAUDICATION: ICD-10-CM

## 2025-06-19 DIAGNOSIS — I10 PRIMARY HYPERTENSION: Primary | ICD-10-CM

## 2025-06-19 PROCEDURE — 3008F BODY MASS INDEX DOCD: CPT | Performed by: INTERNAL MEDICINE

## 2025-06-19 PROCEDURE — 93005 ELECTROCARDIOGRAM TRACING: CPT | Performed by: INTERNAL MEDICINE

## 2025-06-19 PROCEDURE — 3078F DIAST BP <80 MM HG: CPT | Performed by: INTERNAL MEDICINE

## 2025-06-19 PROCEDURE — 1159F MED LIST DOCD IN RCRD: CPT | Performed by: INTERNAL MEDICINE

## 2025-06-19 PROCEDURE — 93010 ELECTROCARDIOGRAM REPORT: CPT | Performed by: INTERNAL MEDICINE

## 2025-06-19 PROCEDURE — 99215 OFFICE O/P EST HI 40 MIN: CPT | Performed by: INTERNAL MEDICINE

## 2025-06-19 PROCEDURE — 99213 OFFICE O/P EST LOW 20 MIN: CPT | Mod: 25 | Performed by: INTERNAL MEDICINE

## 2025-06-19 PROCEDURE — 3074F SYST BP LT 130 MM HG: CPT | Performed by: INTERNAL MEDICINE

## 2025-06-19 NOTE — PROGRESS NOTES
Chief Complaint   Patient presents with    Follow-up      History Of Present Illness:    Joe Hester is a 74 y.o. male presenting for 6-month follow up. Initially established care for concerns of HTN and HLD. Told PCP he had not seen Cardiology for years and was interested in seeing a new provider.  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    Last seen by me in 12/2024 to establish care. He reported newer onset mid-chest heaviness, L arm tingling, lips tingling all at the same time, getting winded.  These symptoms started a few months ago but are getting worse.  The chest discomfort lasts up to 15 minutes at a time before eventually resolving.  We ordered testing for cardiac clearance for possible vascular surgery. TTE 12/24 showed EF 64%, mild aortic valve regurg.    Today, Pt first requests to leave the clinic, noting a long wait and leg pain. Pt is concerned about his vascular surgery and reports a lot pain in his legs. He states all his cardiac concerns mentioned in 12/2024 are now resolved. He is not interested in catheterization but rather to have his vascular surgery. He states his stress testing has been scheduled for 7/9/25. He is unsure of type of anesthesia but states it will be an open surgery. He states he saw his new vascular surgeon yesterday - there is no note yet from Dr. Rosario in our chart.    FHx: unknown - Pt is an orphan  SocHx: tobacco use since age 13. Marijuana use, uses edibles.    Review Of Systems:  The remainder of the review of systems was obtained, and was negative as pertains to the chief complaint.    CV testing and labs reviewed:  Labs 6/2025:  K 4.4   BUN 35  Cr 1.77  ALT 11  AST 17  H&H 13.2 38.7  Lipid panel 12/2020:   HDL 51.5  LDL 85  TG 52    EKG 9/2024: Sinus rhythm. RBBB and LPFB.    Vascular US Lower Extremity  Arterial Duplex Bilateral 4/2025:  Right Bypass Graft: The right to left femoral-femoral bypass graft demonstrates a >50% stenosis in the proximal anastomosis. The bypass is constructed of PTFE graft. The remaining graft appears widely patent. There is an area of mixed echos noted around the distal graft and anastomosis (known finding). 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 Proximal CFA .81 cm, Mid-distal CFA 1.68 cm, Distal CFA .67 cm. (diameter measurements remain the same since previous study 12/4/2024).     TTE 12/2024:   1. The left ventricular systolic function is normal, with a Vera's biplane calculated ejection fraction of 64%.   2. Spectral Doppler shows a normal pattern of left ventricular diastolic filling.   3. There is normal right ventricular global systolic function.   4. Aortic valve stenosis is not present.   5. Mild aortic valve regurgitation.   6. Left Ventricular Global Longitudinal Strain - 14.8 %.    Vascular US Ankle Brachial Index (KVNG) Without Exercise 12/2024:  Right Lower PVR: Evidence of mild arterial occlusive disease in the right lower extremity at rest. Decreased digital perfusion noted. Multiphasic flow is noted in the right common femoral artery, right posterior tibial artery and right dorsalis pedis artery.  Left Lower PVR: Evidence of mild arterial occlusive disease in the left lower extremity at rest. Decreased digital perfusion noted. Multiphasic flow is noted in the left common femoral artery, left posterior tibial artery and left dorsalis pedis artery.    Past Medical History:  He has a past medical history of COPD (chronic obstructive pulmonary disease) (Multi), Easy bruising, Full dentures, HL (hearing loss), Hyperlipidemia, Hypertension, Opioid dependence, in remission (11/21/2014), Personal history of other diseases of the circulatory system (01/21/2020), Personal history of other mental and behavioral  disorders, PTSD (post-traumatic stress disorder), Shortness of breath, and Vision loss.    Past Surgical History:  He has a past surgical history that includes CT angio aorta and bilateral iliofemoral runoff including without contrast if performed (01/29/2021); Cholecystectomy (09/21/2017); Other surgical history (01/21/2020); Other surgical history (10/21/2014); Other surgical history (07/13/2020); CT angio aorta and bilateral iliofemoral runoff including without contrast if performed (01/17/2023); and Cataract extraction w/  intraocular lens implant (Bilateral).      Social History:  He reports that he has been smoking cigarettes. He has a 14.8 pack-year smoking history. He has never used smokeless tobacco. He reports that he does not currently use alcohol. He reports current drug use. Drug: Marijuana.    Family History:  Family History   Problem Relation Name Age of Onset    Emphysema Father          Allergies:  Fentanyl, Gabapentin, and Montelukast    Outpatient Medications:  Current Outpatient Medications   Medication Instructions    acetaminophen (TYLENOL) 1,000 mg, oral, Every 6 hours PRN    albuterol 90 mcg/actuation inhaler 1-2 puffs, inhalation, Every 4 hours PRN    ALPRAZolam (XANAX) 1 mg, oral, Nightly PRN    amLODIPine (NORVASC) 5 mg, oral, Daily    aspirin 81 mg, oral, Daily    atorvastatin (LIPITOR) 10 mg, oral, Daily    buprenorphine-naloxone (Suboxone) 8-2 mg SL film 1 Film, 2 times daily    chlorthalidone (HYGROTON) 25 mg, oral, Daily    cloNIDine (CATAPRES) 0.1 mg, oral, Daily    clopidogrel (PLAVIX) 75 mg, oral, Daily    ipratropium-albuteroL (Duo-Neb) 0.5-2.5 mg/3 mL nebulizer solution 3 mL, nebulization, 3 times daily RT, 1 unit dose    lidocaine (Lidoderm) 5 % patch 1 patch, transdermal, Daily, Apply to painful area 12 hours per day, remove for 12 hours.    lidocaine (Salonpas, lidocaine,) 4 % patch 1 patch, transdermal, Daily, Remove & discard patch within 12 hours or as directed by MD.     "lisinopril 40 mg, oral, Daily    methocarbamol (ROBAXIN) 500 mg, oral, 2 times daily    naloxone (NARCAN) 4 mg    tiotropium-olodateroL (Stiolto Respimat) 2.5-2.5 mcg/actuation mist inhaler 2 puffs, Daily        Last Recorded Vitals:  Vitals:    06/19/25 1631   BP: 110/70   Pulse: 60   Weight: 64.9 kg (143 lb)   Height: 1.727 m (5' 8\")       Physical Exam:  Physical Exam  Vitals reviewed.   HENT:      Head: Normocephalic.      Nose: Nose normal.      Mouth/Throat:      Mouth: Mucous membranes are moist.   Cardiovascular:      Rate and Rhythm: Normal rate.      Pulses: Normal pulses.      Heart sounds: No murmur heard.     Comments: Normal S1-S2  Trivial systolic murmur LUSB  No carotid bruits  No JVD at 90 degrees  Pulmonary:      Effort: Pulmonary effort is normal.      Breath sounds: Normal breath sounds.   Abdominal:      General: Abdomen is flat.      Palpations: Abdomen is soft.   Musculoskeletal:         General: Normal range of motion.      Cervical back: Normal range of motion.   Skin:     General: Skin is warm and dry.   Neurological:      General: No focal deficit present.      Mental Status: He is alert.   Psychiatric:         Mood and Affect: Mood normal.         Last Labs:  CBC -  Lab Results   Component Value Date    WBC 8.8 06/12/2025    HGB 13.2 (L) 06/12/2025    HCT 38.7 (L) 06/12/2025    MCV 93 06/12/2025     06/12/2025       CMP -  Lab Results   Component Value Date    CALCIUM 9.4 06/12/2025    CALCIUM 9.5 04/24/2025    PHOS 3.7 04/24/2025    PROT 7.3 06/12/2025    ALBUMIN 4.9 06/12/2025    ALBUMIN 4.6 04/24/2025    AST 17 06/12/2025    ALT 11 06/12/2025    ALKPHOS 38 06/12/2025    BILITOT 0.6 06/12/2025       LIPID PANEL -   Lab Results   Component Value Date    CHOL 147 12/23/2020    TRIG 52 12/23/2020    HDL 51.5 12/23/2020    CHHDL 2.9 12/23/2020    LDLF 85 12/23/2020    VLDL 10 12/23/2020       RENAL FUNCTION PANEL -   Lab Results   Component Value Date    GLUCOSE 117 (H) 06/12/2025    " "GLUCOSE 131 (H) 04/24/2025     (L) 06/12/2025     04/24/2025    K 4.4 06/12/2025    K 4.5 04/24/2025     06/12/2025     04/24/2025    CO2 23 06/12/2025    CO2 27 04/24/2025    ANIONGAP 13 06/12/2025    BUN 35 (H) 06/12/2025    BUN 27 (H) 04/24/2025    CREATININE 1.77 (H) 06/12/2025    CREATININE 1.36 (H) 04/24/2025    GFRMALE 67 01/13/2023    CALCIUM 9.4 06/12/2025    CALCIUM 9.5 04/24/2025    PHOS 3.7 04/24/2025    ALBUMIN 4.9 06/12/2025    ALBUMIN 4.6 04/24/2025        No results found for: \"BNP\", \"HGBA1C\"    Assessment/Plan   New onset and accelerating angina:  Pt has multiple CAD RF's including HTN, DL, and tobacco use.  IN 12/24 I recommended further evaluation with [facility]/cor angio - the patient was initially agreeable, but then failed to schedule the procedure.  Today he is denying any further chest pain, stating it resolved since last visit.  He is declining catheterization at this time.  -continue Plavix  -continue current antihypertensives    He now requires preoperative risk stratification prior to vascular surgery.  TTE 12/24 showed EF 64%, mild aortic valve regurg.  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   He states he saw his new vascular surgeon yesterday - there is no note yet from Dr. Rosario in our chart.  -following with vascular surgery   -we are trying to move up testing and will Pt about a new date  -will provide cardiac clearance once testing has resulted    Stenosis of Iliac Artery, PVD:  Following with Dr. Marte for management/evaluation.  -continue Plavix  -encouraged smoking cessation    COPD:  Chronic shortness of breath with " exertion. Following with Pulmonology.     Tobacco Use  He was offered medication and/or smoking cessation counseling to help by PCP. Did not tolerate Wellbutrin. Declined any other Rx. at this time. Currently smoking 5 cigarettes/day. LDCT: June 2024, stable.   -encouraged cessation    Opioid Dependence:  Following with Specialist for management with Suboxone.     Scribe Attestation  By signing my name below, Ashleigh STALLWORTH Scribe   attest that this documentation has been prepared under the direction and in the presence of Cesar Alvarez MD.

## 2025-06-19 NOTE — PATIENT INSTRUCTIONS
Thanks for following up in office today.    1)  We are trying to move up your cardiac testing for your surgery clearance.     2)  Please continue your cardiac medications as prescribed.    Follow up with Dr. Alvarez in 3 months  If you have any questions, please call us at [phone]

## 2025-06-20 ENCOUNTER — TELEPHONE (OUTPATIENT)
Dept: CARDIOLOGY | Facility: HOSPITAL | Age: 75
End: 2025-06-20
Payer: MEDICARE

## 2025-06-22 LAB
ATRIAL RATE: 60 BPM
P AXIS: 77 DEGREES
P OFFSET: 198 MS
P ONSET: 134 MS
PR INTERVAL: 182 MS
Q ONSET: 225 MS
QRS COUNT: 10 BEATS
QRS DURATION: 144 MS
QT INTERVAL: 438 MS
QTC CALCULATION(BAZETT): 438 MS
QTC FREDERICIA: 438 MS
R AXIS: 103 DEGREES
T AXIS: 41 DEGREES
T OFFSET: 444 MS
VENTRICULAR RATE: 60 BPM

## 2025-06-22 NOTE — PROGRESS NOTES
"  Vascular Surgery Clinic Note    Referring provider: Marvni Marte,   [address], [address]  [place] [address]  Date of visit: 06/18/2025  3:15 PM EDT  Location of visit: [facility]    CC: NPV PAD     History Of Present Illness:   Joe Hester is a 74 y.o. male here for initial evaluation. He has a history of right to left femoral-femoral bypass grafting using 8mm PTFE performed in 2017 by Dr. Jamari Hill. He also underwent balloon angioplasty of the right EIA/CFA in September 2024 by Dr. Marte. He reports short distance claudication symptoms at less than 50 yards distance. He does have chronic back pain. He uses the motorized scooter while grocery shopping. He denies nocturnal rest pain or tissue loss.     He reports seeing a left eye \"film\" occasionally. He denies unilateral weakness or speech impairments. He smokes 5 cigarettes a day.     Medical History:  Problem List[1]     SH:    Social Drivers of Health     Tobacco Use: High Risk (6/19/2025)    Patient History     Smoking Tobacco Use: Every Day     Smokeless Tobacco Use: Never     Passive Exposure: Not on file   Alcohol Use: Not on file   Financial Resource Strain: Not on file   Food Insecurity: Food Insecurity Present (12/13/2024)    Hunger Vital Sign     Worried About Running Out of Food in the Last Year: Often true     Ran Out of Food in the Last Year: Often true   Transportation Needs: Not on file   Physical Activity: Not on file   Stress: Not on file   Social Connections: Not on file   Intimate Partner Violence: Not on file   Depression: Not at risk (6/16/2025)    PHQ-2     PHQ-2 Score: 0   Housing Stability: Not on file   Utilities: Not on file   Digital Equity: Not on file   Health Literacy: Not on file        FH:  Family History[2]     Allergies:   RX Allergies[3]    ROS:  All systems were reviewed and noted to be negative, other than described above.     Objective:  Last Recorded Vitals  Blood " "pressure 113/71, pulse 87, resp. rate 18, height 1.727 m (5' 8\"), weight 64.9 kg (143 lb).    Meds:   Current Outpatient Medications   Medication Instructions    acetaminophen (TYLENOL) 1,000 mg, oral, Every 6 hours PRN    albuterol 90 mcg/actuation inhaler 1-2 puffs, inhalation, Every 4 hours PRN    ALPRAZolam (XANAX) 1 mg, oral, Nightly PRN    amLODIPine (NORVASC) 5 mg, oral, Daily    aspirin 81 mg, oral, Daily    atorvastatin (LIPITOR) 10 mg, oral, Daily    buprenorphine-naloxone (Suboxone) 8-2 mg SL film 1 Film, 2 times daily    chlorthalidone (HYGROTON) 25 mg, oral, Daily    cloNIDine (CATAPRES) 0.1 mg, oral, Daily    clopidogrel (PLAVIX) 75 mg, oral, Daily    ipratropium-albuteroL (Duo-Neb) 0.5-2.5 mg/3 mL nebulizer solution 3 mL, nebulization, 3 times daily RT, 1 unit dose    lidocaine (Lidoderm) 5 % patch 1 patch, transdermal, Daily, Apply to painful area 12 hours per day, remove for 12 hours.    lidocaine (Salonpas, lidocaine,) 4 % patch 1 patch, transdermal, Daily, Remove & discard patch within 12 hours or as directed by MD.    lisinopril 40 mg, oral, Daily    methocarbamol (ROBAXIN) 500 mg, oral, 2 times daily    naloxone (NARCAN) 4 mg    tiotropium-olodateroL (Stiolto Respimat) 2.5-2.5 mcg/actuation mist inhaler 2 puffs, Daily       Exam:  Constitutional: Well appearing, NAD   PSYCH: Appropriate mood and affect  Eyes: Sclera clear  Neck: Supple   CV: No tachycardia  RESP: Unlabored breathing  GI: Soft, nontender, non-distended  SKIN: No lesions  NEURO: No focal deficits noted. Motor/sensory intact.   EXTREMITIES: Warm & well perfused. No leg edema. No evidence of arterial ischemia. No evidence of venous insufficiency.   PULSES: palpable left DP     Imaging Reviewed:  CT angio aorta and bilateral iliofemoral runoff including without contrast if performed 05/08/2025    Narrative  Interpreted By:  Giorgio Miller,  and Kendrick Howell  STUDY:  CT ANGIO AORTA AND BILATERAL ILIOFEMORAL RUN OFF INCLUDING " WITHOUT  CONTRAST IF PERFORMED;  5/8/2025 1:35 pm    INDICATION:  Signs/Symptoms:aortoiliac occlusive disease.    COMPARISON:  [facility]A 08/02/2024    ACCESSION NUMBER(S):  [id number]    ORDERING CLINICIAN:  HAL JUAREZ    TECHNIQUE:  Thin-section axial images of the abdomen, pelvis and bilateral lower  extremities were obtained in the arterial phase after intravenous  administration of 100 Omnipaque 350 contrast. Delayed images the legs  were obtained for assessment of venous patency. Coronal and sagittal  reformatted images were reconstructed from the axial data.  Multiplanar MIPs and 3D reconstructions were created on an  independent workstation and reviewed.    FINDINGS:  VASCULATURE:    ABDOMINAL AORTA: No abdominal aortic aneurysm. There is again  scattered atherosclerotic calcifications of the abdominal aorta with  the extensive eccentric noncalcified plaque burden particularly in  the infrarenal abdominal aorta which is not significantly changed  since prior examination. Celiac origin is patent. The superior  mesenteric artery origin is patent. The bilateral renal arteries are  patent. The inferior mesenteric artery is patent.    Bilateral common iliac arteries demonstrate multifocal  atherosclerotic calcifications without hemodynamically significant  stenosis. Stenting of the proximal right external iliac artery which  appears patent. The native external iliac artery is again chronically  occluded. The fem-fem bypass graft is again noted. There is partial  thrombosis of the graft at its anastomosis with the left common  femoral artery with severe stenosis which is similar to the prior  examination. The bilateral superficial femoral arteries demonstrate  multifocal atherosclerotic calcifications with scattered degrees of  mild-to-moderate stenosis are similar to the prior examination.  Three-vessel runoff bilaterally to the level of the ankles.    ABDOMEN/PELVIS:    LIVER: No significant parenchymal  abnormality.    BILE DUCTS: No significant intrahepatic or extrahepatic dilatation.    GALLBLADDER: No significant abnormality.    SPLEEN: No significant abnormality.    PANCREAS: No significant abnormality.    ADRENALS: Stable right-sided 1.4 cm adrenal gland adenoma. Left  adrenal gland is unremarkable.    KIDNEYS, URETERS, BLADDER: No significant abnormality.    REPRODUCTIVE ORGANS: No significant abnormality.    VESSELS: See above. No additional significant abnormality.    RETROPERITONEUM/LYMPH NODES: No enlarged lymph nodes.    BOWEL/PERITONEUM:  No inflammatory bowel wall thickening or  dilatation. Normal appendix.    No pneumoperitoneum, significant ascites, or abscess.      ABDOMINAL WALL: No significant abnormality.    MUSCULOSKELETAL:  No acute osseous abnormality.    LOWER CHEST: No acute abnormality involving the lung bases.    Impression  1. Fem-fem bypass graft is again noted with severe stenosis at the  anastomosis of the graft and the left common femoral artery which is  similar to the prior examination.  2. 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  3. Three-vessel runoff to the level of the bilateral ankles.  4. No acute abdominopelvic process.  5. Additional stable chronic and incidental findings described above.    I personally reviewed the image(s) / study and I agree with the  findings as stated by Lynda Marks MD. This study was interpreted at  [facility], [place], [place].    MACRO:  None.    Signed by: Giorgio Miller 5/10/2025 11:22 AM  Dictation workstation:   [id number]  KVNG without exercise 04/09/2025    [place] [id number]  Phone [phone] Fax [phone]      Vascular Lab Report    [facility] US ANKLE BRACHIAL INDEX (KVNG) WITHOUT EXERCISE    Patient Name:      MARIELENA Kenyon Physician: [id number] Elvira Harper MD  Study Date:        4/9/2025             Ordering Provider: [id number] HAL LUO  ANN  MRN/PID:           [id number]             Fellow:  Accession#:        [id number]         Technologist:      Linnette Blanco  RVEFRAIN  Date of Birth/Age: 1950 / 74      Technologist 2:  years  Gender:            M                    Encounter#:        [id number]  Admission Status:  Outpatient           Location           [facility]  Performed:      Diagnosis/ICD: Peripheral vascular disease, unspecified-I73.9  CPT Codes:     78583 Peripheral artery KVNG Only      Pertinent History: RT to LT Fem-Fem BPG.      CONCLUSIONS:  Right Lower PVR: Evidence of mild arterial occlusive disease in the right lower extremity at rest. Decreased digital perfusion noted. Multiphasic flow is noted in the right common femoral artery, right posterior tibial artery and right dorsalis pedis artery.  Left Lower PVR: Evidence of moderate arterial occlusive disease in the left lower extremity at rest. Decreased digital perfusion noted. Multiphasic flow is noted in the left common femoral artery, left posterior tibial artery and left dorsalis pedis artery.    Comparison:  Compared with study from 12/4/2024, Previous study:  Right KVNG: .77  Left KVNG: .72    Todays study:  Right KVNG: .77  Left KVNG: .68.    Imaging & Doppler Findings:    RIGHT Lower PVR                Pressures Ratios  Right Posterior Tibial (Ankle) 94 mmHg   0.77  Right Dorsalis Pedis (Ankle)   86 mmHg   0.70  Right Digit (Great Toe)        50 mmHg   0.41        LEFT Lower PVR                Pressures Ratios  Left Posterior Tibial (Ankle) 83 mmHg   0.68  Left Dorsalis Pedis (Ankle)   70 mmHg   0.57  Left Digit (Great Toe)        48 mmHg   0.39        Right     Left  Brachial Pressure 122 mmHg 121 mmHg        [id number] Elvira Harper MD  Electronically signed by [id number] Elvira Harper MD on 4/9/2025 at 12:58:27 PM        ** Final **      Assessment & Plan:  1. Visual disturbances  Vascular US Carotid Artery Duplex Bilateral      2. PAD (peripheral artery disease)   [facility]    Vascular US Carotid Artery Duplex Bilateral    Nuclear Stress Test      3. Other disorders of arteries, arterioles and capillaries in diseases classified elsewhere  Vascular US Carotid Artery Duplex Bilateral      4. Preop cardiovascular exam  Nuclear Stress Test        AIOD with short distance claudication.   Left eye transient visual disturbances.   Recent CTA shows aortic atheromatous changes along with graft stenosis.   Obtain carotid duplex & ROSE MARIE   Smoking cessation  Start aspirin 81 mg daily   Continue plavix & statin   Plan for aortobifemoral bypass grafting pending workup    Julisa Dewitt, APRN-CNP         [1]   Patient Active Problem List  Diagnosis    Aggressive reaction    Hostile behavior    Allergic rhinitis    Anxiety    Asthma    Atherosclerosis of native artery of left lower extremity with intermittent claudication    Back pain    Chronic pain disorder    COPD (chronic obstructive pulmonary disease) (Multi)    Degeneration of intervertebral disc of lumbar region    Depression, major, single episode, mild    Dysphagia    Hepatitis    Chronic hepatitis C without hepatic coma (Multi)    Hyperlipidemia    Hypersomnia with sleep apnea    Lower extremity pain    Lumbar radiculopathy    Lumbar spondylosis    Nicotine dependence    Opioid dependence on maintenance agonist therapy, no symptoms (Multi)    Peripheral neuropathy    Hypertension    Peripheral vascular disease    PTSD (post-traumatic stress disorder)    Sleep disorder    Spinal stenosis of lumbosacral region    Stenosis of iliac artery    Vitamin B deficiency    Vitamin D deficiency    Medicare annual wellness visit, subsequent    Bacterial arthritis of pelvic region (Multi)    Bilateral cataracts    Bilateral hip joint arthritis    Coronary artery disease due to lipid rich plaque    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

## 2025-06-30 ENCOUNTER — HOSPITAL ENCOUNTER (OUTPATIENT)
Dept: RADIOLOGY | Facility: HOSPITAL | Age: 75
Discharge: HOME | End: 2025-06-30
Payer: MEDICARE

## 2025-06-30 ENCOUNTER — TELEPHONE (OUTPATIENT)
Dept: CARDIOLOGY | Facility: HOSPITAL | Age: 75
End: 2025-06-30
Payer: MEDICARE

## 2025-06-30 ENCOUNTER — HOSPITAL ENCOUNTER (OUTPATIENT)
Dept: CARDIOLOGY | Facility: HOSPITAL | Age: 75
Discharge: HOME | End: 2025-06-30
Payer: MEDICARE

## 2025-06-30 DIAGNOSIS — I73.9 PAD (PERIPHERAL ARTERY DISEASE): ICD-10-CM

## 2025-06-30 DIAGNOSIS — Z01.810 PREOP CARDIOVASCULAR EXAM: ICD-10-CM

## 2025-06-30 PROCEDURE — 93016 CV STRESS TEST SUPVJ ONLY: CPT | Performed by: INTERNAL MEDICINE

## 2025-06-30 PROCEDURE — A9502 TC99M TETROFOSMIN: HCPCS | Performed by: NURSE PRACTITIONER

## 2025-06-30 PROCEDURE — 78452 HT MUSCLE IMAGE SPECT MULT: CPT

## 2025-06-30 PROCEDURE — 3430000001 HC RX 343 DIAGNOSTIC RADIOPHARMACEUTICALS: Performed by: NURSE PRACTITIONER

## 2025-06-30 PROCEDURE — 93018 CV STRESS TEST I&R ONLY: CPT | Performed by: INTERNAL MEDICINE

## 2025-06-30 PROCEDURE — 93017 CV STRESS TEST TRACING ONLY: CPT

## 2025-06-30 PROCEDURE — 2500000004 HC RX 250 GENERAL PHARMACY W/ HCPCS (ALT 636 FOR OP/ED): Performed by: INTERNAL MEDICINE

## 2025-06-30 RX ORDER — REGADENOSON 0.08 MG/ML
0.4 INJECTION, SOLUTION INTRAVENOUS ONCE
Status: COMPLETED | OUTPATIENT
Start: 2025-06-30 | End: 2025-06-30

## 2025-06-30 RX ADMIN — TETROFOSMIN 30 MILLICURIE: 0.23 INJECTION, POWDER, LYOPHILIZED, FOR SOLUTION INTRAVENOUS at 09:33

## 2025-06-30 RX ADMIN — REGADENOSON 0.4 MG: 0.08 INJECTION, SOLUTION INTRAVENOUS at 09:38

## 2025-06-30 RX ADMIN — TETROFOSMIN 10.3 MILLICURIE: 0.23 INJECTION, POWDER, LYOPHILIZED, FOR SOLUTION INTRAVENOUS at 07:25

## 2025-06-30 NOTE — TELEPHONE ENCOUNTER
RN spoke with pt today regarding stress test at this time. Rn notified pt once it is read we will give him a call and notify his vascular doctor. Pt verablized understanding.

## 2025-07-02 ENCOUNTER — PREP FOR PROCEDURE (OUTPATIENT)
Dept: VASCULAR SURGERY | Facility: HOSPITAL | Age: 75
End: 2025-07-02
Payer: MEDICARE

## 2025-07-02 DIAGNOSIS — I74.09 AORTOILIAC OCCLUSIVE DISEASE (MULTI): Primary | ICD-10-CM

## 2025-07-07 ENCOUNTER — HOSPITAL ENCOUNTER (OUTPATIENT)
Dept: VASCULAR MEDICINE | Facility: CLINIC | Age: 75
Discharge: HOME | End: 2025-07-07
Payer: MEDICARE

## 2025-07-07 DIAGNOSIS — I65.23 OCCLUSION AND STENOSIS OF BILATERAL CAROTID ARTERIES: ICD-10-CM

## 2025-07-07 DIAGNOSIS — I73.9 PAD (PERIPHERAL ARTERY DISEASE): ICD-10-CM

## 2025-07-07 DIAGNOSIS — H53.9 VISUAL DISTURBANCES: ICD-10-CM

## 2025-07-07 DIAGNOSIS — I79.8 OTHER DISORDERS OF ARTERIES, ARTERIOLES AND CAPILLARIES IN DISEASES CLASSIFIED ELSEWHERE: ICD-10-CM

## 2025-07-07 PROCEDURE — 93880 EXTRACRANIAL BILAT STUDY: CPT

## 2025-07-07 PROCEDURE — 93880 EXTRACRANIAL BILAT STUDY: CPT | Performed by: SURGERY

## 2025-07-08 ENCOUNTER — DOCUMENTATION (OUTPATIENT)
Dept: CARDIOLOGY | Facility: HOSPITAL | Age: 75
End: 2025-07-08
Payer: MEDICARE

## 2025-07-08 NOTE — PROGRESS NOTES
Patient to have vascular surgery.    In the past had c/o chest pain and was recommended for cor angio/LHC however patient declined.      More recently to have aortobifem bypass by Dr. Wesley Rosario.  He presented for preoperative risk stratification.  At that appointment he adamantly denied chest pain/pressure, anginal equivalents.  Due to his prior symptoms and CAD RF's, stress test was ordered by PCP - we planned to follow up on result for risk stratification.    Stress test result reviewed - low probability of ischemia.      Based on the RCRI risk calculator his risk of perioperative MACE is 5%.  He appears asymptomatic and well compensated from a CV perspective.  Based on this and the result of his recent testing, he is considered to be at acceptable risk for his upcoming aortobifem bypass.  He should continue his cardiovascular medications in the perioperative period.

## 2025-07-09 ENCOUNTER — APPOINTMENT (OUTPATIENT)
Dept: RADIOLOGY | Facility: HOSPITAL | Age: 75
End: 2025-07-09
Payer: MEDICARE

## 2025-07-09 ENCOUNTER — APPOINTMENT (OUTPATIENT)
Dept: CARDIOLOGY | Facility: HOSPITAL | Age: 75
End: 2025-07-09
Payer: MEDICARE

## 2025-07-21 ENCOUNTER — TELEPHONE (OUTPATIENT)
Dept: CARDIOLOGY | Facility: HOSPITAL | Age: 75
End: 2025-07-21
Payer: MEDICARE

## 2025-07-21 NOTE — TELEPHONE ENCOUNTER
RN called pt at this time regarding stress test, no answer at this time. RN left message for patient to call back.

## 2025-07-21 NOTE — TELEPHONE ENCOUNTER
RN called pt at this time to let him know we have the clearance to send to Dr. Rosario. Pt states up at [facility].

## 2025-07-29 ENCOUNTER — CLINICAL SUPPORT (OUTPATIENT)
Dept: PREADMISSION TESTING | Facility: HOSPITAL | Age: 75
End: 2025-07-29
Payer: MEDICARE

## 2025-07-29 RX ORDER — CLONAZEPAM 0.5 MG/1
1 TABLET ORAL 2 TIMES DAILY PRN
COMMUNITY
Start: 2025-07-21

## 2025-07-29 RX ORDER — BUPROPION HYDROCHLORIDE 150 MG/1
150 TABLET ORAL DAILY
COMMUNITY
Start: 2025-06-24

## 2025-07-29 ASSESSMENT — DUKE ACTIVITY SCORE INDEX (DASI)
CAN YOU DO HEAVY WORK AROUND THE HOUSE LIKE SCRUBBING FLOORS OR LIFTING AND MOVING HEAVY FURNITURE: NO
CAN YOU DO YARD WORK LIKE RAKING LEAVES, WEEDING OR PUSHING A MOWER: NO
CAN YOU PARTICIPATE IN MODERATE RECREATIONAL ACTIVITIES LIKE GOLF, BOWLING, DANCING, DOUBLES TENNIS OR THROWING A BASEBALL OR FOOTBALL: NO
CAN YOU DO MODERATE WORK AROUND THE HOUSE LIKE VACUUMING, SWEEPING FLOORS OR CARRYING GROCERIES: NO
CAN YOU WALK A BLOCK OR TWO ON LEVEL GROUND: NO
CAN YOU TAKE CARE OF YOURSELF (EAT, DRESS, BATHE, OR USE TOILET): YES
CAN YOU WALK INDOORS, SUCH AS AROUND YOUR HOUSE: YES
CAN YOU RUN A SHORT DISTANCE: NO
TOTAL_SCORE: 7.2
DASI METS SCORE: 3.6
CAN YOU CLIMB A FLIGHT OF STAIRS OR WALK UP A HILL: NO
CAN YOU HAVE SEXUAL RELATIONS: NO
CAN YOU PARTICIPATE IN STRENOUS SPORTS LIKE SWIMMING, SINGLES TENNIS, FOOTBALL, BASKETBALL, OR SKIING: NO
CAN YOU DO LIGHT WORK AROUND THE HOUSE LIKE DUSTING OR WASHING DISHES: YES

## 2025-07-29 ASSESSMENT — ENCOUNTER SYMPTOMS
CARDIOVASCULAR NEGATIVE: 1
GASTROINTESTINAL NEGATIVE: 1
MUSCULOSKELETAL NEGATIVE: 1
NEUROLOGICAL NEGATIVE: 1
VISUAL CHANGE: 1
ENDOCRINE NEGATIVE: 1
NECK NEGATIVE: 1
BRUISES/BLEEDS EASILY: 1
CONSTITUTIONAL NEGATIVE: 1
RESPIRATORY NEGATIVE: 1

## 2025-07-29 NOTE — CPM/PAT H&P
[facility]/PAT Evaluation       Name: Joe Hester (Joe Hester)  /Age: 1950/74 y.o.     {[facility] Visit Type:55486}      Chief Complaint: ***    HPI    Medical History[1]    Surgical History[2]    Patient  reports that he is not currently sexually active and has had partner(s) who are female.    Family History[3]    Allergies[4]    Joe Hester is scheduled for CREATION, BYPASS, ARTERIAL, AORTA TO FEMORAL, USING GRAFT on 25      **  Prior to Admission medications   Medication Sig Start Date End Date Taking? Authorizing Provider   albuterol 90 mcg/actuation inhaler Inhale 1-2 puffs every 4 hours if needed for shortness of breath. 25   JAELYN Mejía   ALPRAZolam (Xanax) 1 mg tablet Take 1 tablet (1 mg) by mouth as needed at bedtime for anxiety for up to 7 days. 25  Marvin Marte, DO   amLODIPine (Norvasc) 5 mg tablet Take 1 tablet (5 mg) by mouth once daily. 4/3/25   JAELYN Mejía   aspirin 81 mg EC tablet Take 1 tablet (81 mg) by mouth once daily. Do not fill before 2024. 24   Krystin Munoz PA-C   atorvastatin (Lipitor) 10 mg tablet Take 1 tablet (10 mg) by mouth once daily. 10/4/24 9/29/25  JAELYN Mejía   buprenorphine-naloxone (Suboxone) 8-2 mg SL film Place 1 Film under the tongue 2 times a day.    Historical Provider, MD   chlorthalidone (Hygroton) 25 mg tablet Take 1 tablet (25 mg) by mouth once daily. 10/4/24 9/29/25  JAELYN Mejía   cloNIDine (Catapres) 0.1 mg tablet Take 1 tablet (0.1 mg) by mouth once daily. 10/4/24 9/29/25  JAELYN Mejía   clopidogrel (Plavix) 75 mg tablet Take 1 tablet (75 mg) by mouth once daily. 25   Krystin Munoz PA-C   ipratropium-albuteroL (Duo-Neb) 0.5-2.5 mg/3 mL nebulizer solution Take 3 mL by nebulization 3 times a day. 1 unit dose 24   AMANDA Mejía-CNS   lidocaine (Lidoderm) 5 % patch Place 1 patch over 12 hours on the skin once daily. Apply to painful  area 12 hours per day, remove for 12 hours. 4/4/25 4/4/26  JAELYN Mejía   lidocaine (Salonpas, lidocaine,) 4 % patch Place 1 patch over 12 hours on the skin once daily. Remove & discard patch within 12 hours or as directed by MD. 6/12/25   Rigo Louis, DO   lisinopril 40 mg tablet Take 1 tablet (40 mg) by mouth once daily. 10/4/24   JAELYN Mejía   methocarbamol (Robaxin) 500 mg tablet Take 1 tablet (500 mg) by mouth 2 times a day for 10 days. 6/16/25 6/26/25  JAELYN Mejía   naloxone (Narcan) 4 mg/0.1 mL nasal spray Administer 1 spray (4 mg) into affected nostril(s). For opioid overdose    Historical Provider, MD   tiotropium-olodateroL (Stiolto Respimat) 2.5-2.5 mcg/actuation mist inhaler Inhale 2 Inhalations once daily.    Historical Provider, MD RICH ROS:   Constitutional:   neg    Neuro/Psych:   neg    Eyes:    vision loss   use of corrective lenses  Ears:   neg    Nose:   neg    Mouth:   neg    Throat:   neg    Neck:   neg    Cardio:   neg    Respiratory:   neg    Endocrine:   neg    GI:   neg    :   neg    Musculoskeletal:   neg    Hematologic:    bruises/bleeds easily  Skin:  neg        PAT Physical Exam     Airway    Visit Vitals  Smoking Status Former       DASI Risk Score      Flowsheet Row Clinical Support from 7/29/2025 in [facility]   Can you take care of yourself (eat, dress, bathe, or use toilet)?  2.75 filed at 07/29/2025 0915   Can you walk indoors, such as around your house? 1.75 filed at 07/29/2025 0915   Can you walk a block or two on level ground?  0 filed at 07/29/2025 0915   Can you climb a flight of stairs or walk up a hill? 0 filed at 07/29/2025 0915   Can you run a short distance? 0 filed at 07/29/2025 0915   Can you do light work around the house like dusting or washing dishes? 2.7 filed at 07/29/2025 0915   Can you do moderate work around the house like vacuuming, sweeping floors or carrying groceries? 0 filed at 07/29/2025 0952    Can you do heavy work around the house like scrubbing floors or lifting and moving heavy furniture?  0 filed at 07/29/2025 0915   Can you do yard work like raking leaves, weeding or pushing a mower? 0 filed at 07/29/2025 0915   Can you have sexual relations? 0 filed at 07/29/2025 0915   Can you participate in moderate recreational activities like golf, bowling, dancing, doubles tennis or throwing a baseball or football? 0 filed at 07/29/2025 0915   Can you participate in strenous sports like swimming, singles tennis, football, basketball, or skiing? 0 filed at 07/29/2025 0915   DASI SCORE 7.2 filed at 07/29/2025 0915   METS Score (Will be calculated only when all the questions are answered) 3.6 filed at 07/29/2025 0915     Caprini DVT Assessment      Flowsheet Row Pre-Admission Testing from 9/9/2024 in  [facility]   DVT Score (IF A SCORE IS NOT CALCULATING, MUST SELECT A BMI TO COMPLETE) 9 filed at 09/09/2024 0827   BMI (BMI MUST BE CHOSEN) 30 or less filed at 09/09/2024 0827   RETIRED: Current Status Major surgery planned, lasting 2-3 hours, Major surgery planned, lasting over 3 hours, COPD filed at 09/09/2024 0827   RETIRED: History Prior major surgery, COPD filed at 09/09/2024 0827   RETIRED: Age 60-75 years filed at 09/09/2024 0827     Modified Frailty Index    No data to display  [id number] Stroke Risk Points  Current as of just now        N/A 0 to 9 Points:      Last Change: N/A          The [id number] risk score (Lip GH, et al. 2009. © 2010 American [employer]) quantifies the risk of stroke for a patient with atrial fibrillation. For patients without atrial fibrillation or under the age of 18 this score appears as N/A. Higher score values generally indicate higher risk of stroke.        This score is not applicable to this patient. Components are not calculated.          Revised Cardiac Risk Index      Flowsheet Row Pre-Admission Testing [facility] 9[facility]9/2024 in  [facility]   High-Risk Surgery (Intraperitoneal, Intrathoracic,Suprainguinal vascular) 1 filed at 09/09/2024 0828   History of ischemic heart disease (History of MI, History of positive exercuse test, Current chest paint considered due to myocardial ischemia, Use of nitrate therapy, ECG with pathological Q Waves) 1 filed at 09/09/2024 0828   History of congestive heart failure (pulmonary edemia, bilateral rales or S3 gallop, Paroxysmal nocturnal dyspnea, CXR showing pulmonary vascular redistribution) 0 filed at 09/09/2024 0828   History of cerebrovascular disease (Prior TIA or stroke) 0 filed at 09/09/2024 0828   Pre-operative insulin treatment 0 filed at 09/09/2024 0828   Pre-operative creatinine>2 mg/dl 0 filed at 09/09/2024 0828   Revised Cardiac Risk Calculator 2 filed at 09/09/2024 0828     Apfel Simplified Score      Flowsheet Row Pre-Admission Testing from 9/9/2024 in  [facility]   Smoking status 0 filed at 09/09/2024 0828   History of motion sickness or PONV  0 filed at 09/09/2024 0828   Use of postoperative opioids 1 filed at 09/09/2024 0828   Gender - Female 0=No filed at 09/09/2024 0828   Apfel Simplified Score Calculator 1 filed at 09/09/2024 0828     Risk Analysis Index Results This Encounter    No data found in the last 10 encounters.       Stop Bang Score      Flowsheet Row Admission (Discharged) from 9/20/2024 in [facility] OR with Marvin Marte, DO Pre-Admission Testing from 9/9/2024 in  [facility]   Do you snore loudly? 0 filed at 09/20/2024 0616 0 filed at 09/09/2024 0754   Do you often feel tired or fatigued after your sleep? 0 filed at 09/20/2024 0616 0 filed at 09/09/2024 0754   Has anyone ever observed you stop breathing in your sleep? 0 filed at 09/20/2024 0616 0 filed at 09/09/2024 0754   Do you have or are you being treated for high blood pressure? 1 filed at 09/20/2024 0616 1 filed at 09/09/2024 0754   Recent BMI (Calculated) 24.1 filed at  2024 0616 23.9 filed at 2024 0754   Is BMI greater than 35 kg/m2? 0=No filed at 202416 0=No filed at 2024   Age older than 50 years old? 1=Yes filed at 2024 0616 1=Yes filed at 2024   Is your neck circumference greater than 17 inches (Male) or 16 inches (Female)? 0 filed at 2024 0616 0 filed at 2024   Gender - Male 1=Yes filed at 2024 0616 1=Yes filed at 2024   STOP-BANG Total Score 3 filed at 2024 3 filed at 2024     Prodigy: High Risk  Total Score: 23              Prodigy Age Score      Prodigy Gender Score     Prodigy Previous Opioid Use Score           ARISCAT Score for Postoperative Pulmonary Complications    No data to display  Mallory Perioperative Risk for Myocardial Infarction or Cardiac Arrest (JESSE)    No data to display    --Testing/Diagnostic:        - EK25  Normal sinus rhythm  Right bundle branch block  Abnormal ECG      - Echo: 24  CONCLUSIONS:   1. The left ventricular systolic function is normal, with a Vera's biplane calculated ejection fraction of 64%.   2. Spectral Doppler shows a normal pattern of left ventricular diastolic filling.   3. There is normal right ventricular global systolic function.   4. Aortic valve stenosis is not present.   5. Mild aortic valve regurgitation.   6. Left Ventricular Global Longitudinal Strain - 14.8 %.      - Stress Test: 25  Summary:   1. Correlate with myocardial perfusion imaging results.   2. Normal Stress Test.   3. Nuclear image results are reported separately.      - METS: 3.6      - Carotids: 25  CONCLUSIONS:  Right Carotid: Findings are consistent with less than 50% stenosis of the right proximal internal carotid artery. Right external carotid artery appears patent with no evidence of stenosis. No evidence of hemodynamically significant stenosis of the right common carotid artery. The right vertebral artery is patent with  antegrade flow. No evidence of hemodynamically significant stenosis in the right subclavian artery.  Left Carotid: Findings are consistent with less than 50% stenosis of the left proximal internal carotid artery. Left external carotid artery appears patent with no evidence of stenosis. No evidence of hemodynamically significant stenosis of the left common carotid artery. The left vertebral artery is patent with antegrade flow. No evidence of hemodynamically significant stenosis in the left subclavian artery.      - CT Angio AORTA AND BILATERAL ILIOFEMORAL : 05/08/25  IMPRESSION:  1. Fem-fem bypass graft is again noted with severe stenosis at the  anastomosis of the graft and the left common femoral artery which is  similar to the prior examination.  2. 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  3. Three-vessel runoff to the level of the bilateral ankles.  4. No acute abdominopelvic process.  5. Additional stable chronic and incidental findings described above.        - Vascular US LE Duplex: 12/04/24  CONCLUSIONS:  Right Bypass Graft: The right to left femoral-femoral bypass graft appears widely patent. The bypass is constructed of PTFE graft.  Additional Findings:  The bypass graft appears widely patent with no evidence of stenosis or  occlusion. There is an area of mixed echos noted around the proximal graft as well as the distal anastomosis and outflow (common femoral artery).  The distal common femoral artery appears aneurysmal with mural thrombus noted.  Proximal CFA 0.81 cm Mid/distal CFA 1.68 cm Distal CFA 0.65 cm.        Patient Specialist/PCP:                                                                                                           Vascular: Julisa Dewitt CNP (Marvin Marte) evaluation of PAD, AIOD with short distance claudication.   Left eye transient visual disturbances.   Recent CTA shows aortic atheromatous changes along with graft stenosis.       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 Bilateral Flank Pain Hep C, Vitamin D & B12 deficiency, anxiety, depression       Addiction Medicine: Alexi Tang 06/23/25 seen for monitoring Suboxone maintenance therapy           _________________________________________________________________  Medication instructions:   Instructed to hold Vitamins, Supplements and Ibuprofen 7 days prior to surgery       Malgorzata Aguirre LPN  Preadmission Testing        Nurse Plan of Action:  Cardiac risk stratification provided see documentation on  7/8/25            Assessment and Plan:     {[facility] ASSESSMENT AND PLAN:17013}             [1]   Past Medical History:  Diagnosis Date    Anxiety     COPD (chronic obstructive pulmonary disease) (Multi)     Depression     Easy bruising     Full dentures     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    Personal history of other diseases of the circulatory system 01/21/2020    History of intermittent claudication    Personal history of other mental and behavioral disorders     History of depression    PTSD (post-traumatic stress disorder)     needle phobia    Shortness of breath     Vision loss     wears corrective lens   [2]   Past Surgical History:  Procedure Laterality Date    CATARACT EXTRACTION W/  INTRAOCULAR LENS IMPLANT Bilateral     CHOLECYSTECTOMY  09/21/2017    Cholecystectomy    CT ANGIO AORTA AND BILATERAL ILIOFEMORAL RUN OFF INCLUDING WITHOUT CONTRAST  "IF PERFORMED  01/29/2021    CT AORTA AND BILATERAL ILIOFEMORAL RUNOFF ANGIOGRAM W AND/OR WO IV CONTRAST 1/29/2021 [facility]    CT ANGIO AORTA AND BILATERAL ILIOFEMORAL RUN OFF INCLUDING WITHOUT CONTRAST IF PERFORMED  01/17/2023    CT AORTA AND BILATERAL ILIOFEMORAL RUNOFF ANGIOGRAM W AND/OR WO IV CONTRAST 1/17/2023 DOCTOR OFFICE LEGACY    OTHER SURGICAL HISTORY  01/21/2020    Leg surgery    OTHER SURGICAL HISTORY  10/21/2014    Surgery    OTHER SURGICAL HISTORY  07/13/2020    Angioplasty   [3]   Family History  Problem Relation Name Age of Onset    Emphysema Father     [4]   Allergies  Allergen Reactions    Gabapentin Other     \"Messes with vision\"    Montelukast Other and Headache     Eye pain      "

## 2025-07-29 NOTE — SIGNIFICANT EVENT
07/29/25 0915   DASI Activity Score Index   Can you take care of yourself (eat, dress, bathe, or use toilet)?  2.75   Can you walk indoors, such as around your house? 1.75   Can you walk a block or two on level ground?  0   Can you climb a flight of stairs or walk up a hill? 0   Can you run a short distance? 0   Can you do light work around the house like dusting or washing dishes? 2.7   Can you do moderate work around the house like vacuuming, sweeping floors or carrying groceries? 0   Can you do heavy work around the house like scrubbing floors or lifting and moving heavy furniture?  0   Can you do yard work like raking leaves, weeding or pushing a mower? 0   Can you have sexual relations? 0   Can you participate in moderate recreational activities like golf, bowling, dancing, doubles tennis or throwing a baseball or football? 0   Can you participate in strenous sports like swimming, singles tennis, football, basketball, or skiing? 0   DASI SCORE 7.2   METS Score (Will be calculated only when all the questions are answered) 3.6

## 2025-08-05 ENCOUNTER — RESULTS FOLLOW-UP (OUTPATIENT)
Dept: PRIMARY CARE | Facility: CLINIC | Age: 75
End: 2025-08-05

## 2025-08-05 ENCOUNTER — PRE-ADMISSION TESTING (OUTPATIENT)
Dept: PREADMISSION TESTING | Facility: HOSPITAL | Age: 75
End: 2025-08-05
Payer: MEDICARE

## 2025-08-05 VITALS
HEIGHT: 68 IN | SYSTOLIC BLOOD PRESSURE: 108 MMHG | BODY MASS INDEX: 22.16 KG/M2 | HEART RATE: 65 BPM | OXYGEN SATURATION: 95 % | DIASTOLIC BLOOD PRESSURE: 69 MMHG | TEMPERATURE: 97.9 F | WEIGHT: 146.2 LBS

## 2025-08-05 DIAGNOSIS — Z01.818 PREOPERATIVE EXAMINATION: Primary | ICD-10-CM

## 2025-08-05 DIAGNOSIS — N17.9 AKI (ACUTE KIDNEY INJURY): Primary | ICD-10-CM

## 2025-08-05 DIAGNOSIS — I74.09 AORTOILIAC OCCLUSIVE DISEASE (MULTI): ICD-10-CM

## 2025-08-05 LAB
ABO GROUP (TYPE) IN BLOOD: NORMAL
ALBUMIN SERPL BCP-MCNC: 4.5 G/DL (ref 3.4–5)
ALP SERPL-CCNC: 45 U/L (ref 33–136)
ALT SERPL W P-5'-P-CCNC: 17 U/L (ref 10–52)
ANION GAP SERPL CALC-SCNC: 13 MMOL/L (ref 10–20)
ANTIBODY SCREEN: NORMAL
APTT PPP: 27 SECONDS (ref 26–36)
AST SERPL W P-5'-P-CCNC: 21 U/L (ref 9–39)
BASOPHILS # BLD AUTO: 0.06 X10*3/UL (ref 0–0.1)
BASOPHILS NFR BLD AUTO: 0.7 %
BILIRUB SERPL-MCNC: 0.5 MG/DL (ref 0–1.2)
BUN SERPL-MCNC: 30 MG/DL (ref 6–23)
CALCIUM SERPL-MCNC: 9.5 MG/DL (ref 8.6–10.6)
CHLORIDE SERPL-SCNC: 103 MMOL/L (ref 98–107)
CO2 SERPL-SCNC: 25 MMOL/L (ref 21–32)
CREAT SERPL-MCNC: 1.8 MG/DL (ref 0.5–1.3)
EGFRCR SERPLBLD CKD-EPI 2021: 39 ML/MIN/1.73M*2
EOSINOPHIL # BLD AUTO: 0.16 X10*3/UL (ref 0–0.4)
EOSINOPHIL NFR BLD AUTO: 1.9 %
ERYTHROCYTE [DISTWIDTH] IN BLOOD BY AUTOMATED COUNT: 12.8 % (ref 11.5–14.5)
GLUCOSE SERPL-MCNC: 112 MG/DL (ref 74–99)
HCT VFR BLD AUTO: 38.1 % (ref 41–52)
HGB BLD-MCNC: 12.8 G/DL (ref 13.5–17.5)
IMM GRANULOCYTES # BLD AUTO: 0.03 X10*3/UL (ref 0–0.5)
IMM GRANULOCYTES NFR BLD AUTO: 0.4 % (ref 0–0.9)
INR PPP: 1.1 (ref 0.9–1.1)
LYMPHOCYTES # BLD AUTO: 1.1 X10*3/UL (ref 0.8–3)
LYMPHOCYTES NFR BLD AUTO: 12.9 %
MCH RBC QN AUTO: 31.9 PG (ref 26–34)
MCHC RBC AUTO-ENTMCNC: 33.6 G/DL (ref 32–36)
MCV RBC AUTO: 95 FL (ref 80–100)
MONOCYTES # BLD AUTO: 0.46 X10*3/UL (ref 0.05–0.8)
MONOCYTES NFR BLD AUTO: 5.4 %
NEUTROPHILS # BLD AUTO: 6.7 X10*3/UL (ref 1.6–5.5)
NEUTROPHILS NFR BLD AUTO: 78.7 %
NRBC BLD-RTO: 0 /100 WBCS (ref 0–0)
PLATELET # BLD AUTO: 211 X10*3/UL (ref 150–450)
POTASSIUM SERPL-SCNC: 4.7 MMOL/L (ref 3.5–5.3)
PROT SERPL-MCNC: 6.9 G/DL (ref 6.4–8.2)
PROTHROMBIN TIME: 12 SECONDS (ref 9.8–12.4)
RBC # BLD AUTO: 4.01 X10*6/UL (ref 4.5–5.9)
RH FACTOR (ANTIGEN D): NORMAL
SODIUM SERPL-SCNC: 136 MMOL/L (ref 136–145)
WBC # BLD AUTO: 8.5 X10*3/UL (ref 4.4–11.3)

## 2025-08-05 PROCEDURE — 86901 BLOOD TYPING SEROLOGIC RH(D): CPT

## 2025-08-05 PROCEDURE — 85730 THROMBOPLASTIN TIME PARTIAL: CPT

## 2025-08-05 PROCEDURE — 85025 COMPLETE CBC W/AUTO DIFF WBC: CPT

## 2025-08-05 PROCEDURE — 80053 COMPREHEN METABOLIC PANEL: CPT | Performed by: CLINICAL NURSE SPECIALIST

## 2025-08-05 PROCEDURE — 99204 OFFICE O/P NEW MOD 45 MIN: CPT

## 2025-08-05 PROCEDURE — 36415 COLL VENOUS BLD VENIPUNCTURE: CPT

## 2025-08-05 PROCEDURE — 87081 CULTURE SCREEN ONLY: CPT

## 2025-08-05 RX ORDER — CHLORHEXIDINE GLUCONATE ORAL RINSE 1.2 MG/ML
15 SOLUTION DENTAL DAILY
Qty: 30 ML | Refills: 0 | Status: SHIPPED | OUTPATIENT
Start: 2025-08-05 | End: 2025-08-21

## 2025-08-05 RX ORDER — CHLORHEXIDINE GLUCONATE 40 MG/ML
SOLUTION TOPICAL DAILY
Qty: 473 ML | Refills: 0 | Status: SHIPPED | OUTPATIENT
Start: 2025-08-05 | End: 2025-08-10

## 2025-08-05 ASSESSMENT — DUKE ACTIVITY SCORE INDEX (DASI)
CAN YOU CLIMB A FLIGHT OF STAIRS OR WALK UP A HILL: YES
CAN YOU PARTICIPATE IN STRENOUS SPORTS LIKE SWIMMING, SINGLES TENNIS, FOOTBALL, BASKETBALL, OR SKIING: NO
TOTAL_SCORE: 26.95
CAN YOU TAKE CARE OF YOURSELF (EAT, DRESS, BATHE, OR USE TOILET): YES
CAN YOU HAVE SEXUAL RELATIONS: NO
CAN YOU WALK INDOORS, SUCH AS AROUND YOUR HOUSE: YES
CAN YOU DO HEAVY WORK AROUND THE HOUSE LIKE SCRUBBING FLOORS OR LIFTING AND MOVING HEAVY FURNITURE: YES
CAN YOU RUN A SHORT DISTANCE: NO
CAN YOU WALK A BLOCK OR TWO ON LEVEL GROUND: YES
CAN YOU DO YARD WORK LIKE RAKING LEAVES, WEEDING OR PUSHING A MOWER: NO
DASI METS SCORE: 6.1
CAN YOU PARTICIPATE IN MODERATE RECREATIONAL ACTIVITIES LIKE GOLF, BOWLING, DANCING, DOUBLES TENNIS OR THROWING A BASEBALL OR FOOTBALL: NO
CAN YOU DO MODERATE WORK AROUND THE HOUSE LIKE VACUUMING, SWEEPING FLOORS OR CARRYING GROCERIES: YES
CAN YOU DO LIGHT WORK AROUND THE HOUSE LIKE DUSTING OR WASHING DISHES: YES

## 2025-08-05 ASSESSMENT — ENCOUNTER SYMPTOMS
DOUBLE VISION: 0
EXCESSIVE BLEEDING: 0
NECK SWELLING: 0
WEAKNESS: 0
TREMORS: 0
LIMITED RANGE OF MOTION: 0
CONSTIPATION: 0
DYSPNEA AT REST: 0
SHORTNESS OF BREATH: 0
DYSURIA: 0
NUMBNESS: 0
NECK MASS: 0
UNEXPECTED WEIGHT CHANGE: 0
JOINT SWELLING: 0
RHINORRHEA: 0
EYE DISCHARGE: 0
WHEEZING: 0
NECK PAIN: 0
BLOOD IN STOOL: 0
ABDOMINAL DISTENTION: 0
VOMITING: 0
BRUISES/BLEEDS EASILY: 0
LIGHT-HEADEDNESS: 0
VISUAL CHANGE: 0
VERTIGO: 0
PALPITATIONS: 0
ARTHRALGIAS: 1
MYALGIAS: 1
VOICE CHANGE: 0
HEMOPTYSIS: 0
NAUSEA: 0
POLYDIPSIA: 0
SINUS CONGESTION: 0
PND: 0
CHILLS: 0
FEVER: 0
NECK STIFFNESS: 0
DYSPNEA WITH EXERTION: 0
CONFUSION: 0
NERVOUS/ANXIOUS: 0
DIARRHEA: 0
DIFFICULTY URINATING: 0
ABDOMINAL PAIN: 0
TROUBLE SWALLOWING: 0
WOUND: 0
COUGH: 0
SKIN CHANGES: 0

## 2025-08-05 ASSESSMENT — LIFESTYLE VARIABLES: SMOKING_STATUS: NONSMOKER

## 2025-08-05 NOTE — NURSING NOTE
Patient seen in PAT. Orders reviewed with PAT Provider.     Following labs obtained by documenting RN:     MRSA SWAB,  T/S, COAG, CBC W DIFF, CMP      Patient discharged with: Pre-procedure instructions/AVS.        Angeles AMAYAN, RN  [facility] Pre-Admission [facility]   [facility]

## 2025-08-05 NOTE — CPM/PAT H&P
CPM/PAT Evaluation       Name: Joe Hester (Joe Hester)  /Age: 1950/74 y.o.     Visit Type:   In-Person       Chief Complaint: perioperative evaluation    HPI HPI: 73 y/o male scheduled for CREATION, BYPASS, ARTERIAL, AORTA TO FEMORAL, USING GRAFT  on 2025  secondary to Aortoiliac occlusive disease  with Dr. Wesley Rosario who referred to CPM.  Presents to CPM today for perioperative risk stratification and optimization. PMHX includes Anxiety, Depression, PTSD, Substance Use Disorder (in remission, opioids) Medicinal Marijuana Use, Aortoiliac occlusive disease, HTN, HLD, PVD, CAD, RBBB, COPD, Chronic Hepatitis C, Chronic Pain Disorder, Lumbar radiculopathy, peripheral neuropathy, spinal stenosis of lumbrosacral region.    To note-patient reports he has been stopping some of his medications due to price/financial instability.  Patient was provided alternatives to chlorhexidine if unable to attain.     PCP: AMANDA Mejía-CNS   Specialists:   Vascular- AMANDA Keller-WOJCIECH & Wesley Rosario   Cardiology-  Cesar Alvarez MD   Addiction Medicine- Alexi Tang MD          Medical History[1]    Surgical History[2]    Patient  reports that he is not currently sexually active and has had partner(s) who are female.    Family History[3]    Allergies[4]    Prior to Admission medications   Medication Sig Start Date End Date Taking? Authorizing Provider   albuterol 90 mcg/actuation inhaler Inhale 1-2 puffs every 4 hours if needed for shortness of breath. 25   AMANDA Mejía-CNS   ALPRAZolam (Xanax) 1 mg tablet Take 1 tablet (1 mg) by mouth as needed at bedtime for anxiety for up to 7 days. 25  Marvin Marte, DO   amLODIPine (Norvasc) 5 mg tablet Take 1 tablet (5 mg) by mouth once daily. 4/3/25   AMANDA Mejía-CNS   aspirin 81 mg EC tablet Take 1 tablet (81 mg) by mouth once daily. Do not fill before 2024. 24   Krystin Munoz PA-C   atorvastatin (Lipitor) 10 mg  tablet Take 1 tablet (10 mg) by mouth once daily. 10/4/24 9/29/25  JAELYN Mejía   buprenorphine-naloxone (Suboxone) 8-2 mg SL film Place 1 Film under the tongue 2 times a day.    Historical Provider, MD   buPROPion XL (Wellbutrin XL) 150 mg 24 hr tablet Take 1 tablet (150 mg) by mouth once daily. Do not crush, chew, or split 6/24/25   Historical Provider, MD   chlorthalidone (Hygroton) 25 mg tablet Take 1 tablet (25 mg) by mouth once daily. 10/4/24 9/29/25  JAELYN Mejía   clonazePAM (KlonoPIN) 0.5 mg tablet Take 1 tablet (0.5 mg) by mouth 2 times a day as needed for anxiety. 7/21/25   Historical Provider, MD   cloNIDine (Catapres) 0.1 mg tablet Take 1 tablet (0.1 mg) by mouth once daily. 10/4/24 11/1/25  JAELYN Mejía   clopidogrel (Plavix) 75 mg tablet Take 1 tablet (75 mg) by mouth once daily. 5/21/25   Krysitn Munoz PA-C   ipratropium-albuteroL (Duo-Neb) 0.5-2.5 mg/3 mL nebulizer solution Take 3 mL by nebulization 3 times a day. 1 unit dose  Patient not taking: Reported on 7/29/2025 2/21/24   JAELYN Mejía   lidocaine (Lidoderm) 5 % patch Place 1 patch over 12 hours on the skin once daily. Apply to painful area 12 hours per day, remove for 12 hours.  Patient not taking: Reported on 7/29/2025 4/4/25 4/4/26  JAELYN Mejía   lidocaine (Salonpas, lidocaine,) 4 % patch Place 1 patch over 12 hours on the skin once daily. Remove & discard patch within 12 hours or as directed by MD.  Patient not taking: Reported on 7/29/2025 6/12/25   Rigo Louis DO   lisinopril 40 mg tablet Take 1 tablet (40 mg) by mouth once daily. 10/4/24   JAELYN Mejía   methocarbamol (Robaxin) 500 mg tablet Take 1 tablet (500 mg) by mouth 2 times a day for 10 days.  Patient not taking: Reported on 7/29/2025 6/16/25 6/26/25  AMANDA Mejía-CNS   naloxone (Narcan) 4 mg/0.1 mL nasal spray Administer 1 spray (4 mg) into affected nostril(s). For opioid overdose    Historical  Provider, MD   tiotropium-olodateroL (Stiolto Respimat) 2.5-2.5 mcg/actuation mist inhaler Inhale 2 Inhalations if needed.    Historical Provider, MD RICH ROS:   Constitutional:    no fever   no chills   no unexpected weight change  Neuro/Psych:    no numbness   no weakness   no light-headedness   no tremors   no confusion   not nervous/anxious   no self-injury   no suicidal ideation  Eyes:    no discharge   no vision loss   no diplopia   no visual disturbance   use of corrective lenses  Ears:    no ear pain   no hearing loss   no vertigo   no tinnitus   no hearing aides  Nose:    no nasal discharge   no sinus congestion   no epistaxis  Mouth:    no dental issues   no mouth pain   no oral bleeding   no mouth lesions  Throat:    no throat pain   no dysphagia   no voice change  Neck:    no neck pain   neck swelling   no neck stiffness   no neck masses  Cardio:    no chest pain   no palpitations   no peripheral edema   no dyspnea   no CALLOWAY   no paroxysmal nocturnal dyspnea  Respiratory:    no cough   no wheezing   no hemoptysis   no shortness of breath  Endocrine:    no cold intolerance   no heat intolerance   no polydipsia  GI:    no abdominal distention   no abdominal pain   no constipation   no diarrhea   no nausea   no vomiting   no blood in stool  :    no difficulty urinating   no dysuria   no oliguria   no polyuria  Musculoskeletal:    arthralgias   myalgias (bilateral leg claudication, constant and 8/10 or worse)   no decreased ROM   no swelling  Hematologic:    does not bruise/bleed easily   no excessive bleeding   no history of blood transfusion   no blood clots  Skin:   no skin changes   no sores/wound   no rash      Physical Exam  Vitals reviewed.   Constitutional:       General: He is not in acute distress.     Appearance: Normal appearance. He is normal weight. He is not ill-appearing, toxic-appearing or diaphoretic.      Comments: Cane to ambulate   HENT:      Head: Normocephalic.      Nose: Nose  "normal. No congestion or rhinorrhea.      Mouth/Throat:      Mouth: Mucous membranes are moist.      Pharynx: Oropharynx is clear. No oropharyngeal exudate or posterior oropharyngeal erythema.     Eyes:      General: No scleral icterus.        Right eye: No discharge.         Left eye: No discharge.      Conjunctiva/sclera: Conjunctivae normal.     Neck:      Vascular: No carotid bruit.     Cardiovascular:      Rate and Rhythm: Normal rate and regular rhythm.      Pulses: Normal pulses.      Heart sounds: Normal heart sounds. No murmur heard.     No friction rub. No gallop.   Pulmonary:      Effort: Pulmonary effort is normal. No respiratory distress.      Breath sounds: Normal breath sounds. No stridor. No wheezing, rhonchi or rales.   Chest:      Chest wall: No tenderness.     Musculoskeletal:         General: Tenderness (BLE) present. No swelling.      Cervical back: Normal range of motion. No rigidity or tenderness.     Neurological:      General: No focal deficit present.      Mental Status: He is alert.     Psychiatric:         Mood and Affect: Mood normal.         Behavior: Behavior normal.         Thought Content: Thought content normal.         Judgment: Judgment normal.          PAT AIRWAY:   Airway:     Mallampati::  II    TM distance::  >3 FB    Neck ROM::  Full   upper dentures and lower dentures        Visit Vitals  /69   Pulse 65   Temp 36.6 °C (97.9 °F) (Temporal)   Ht 1.727 m (5' 8\")   Wt 66.3 kg (146 lb 3.2 oz)   SpO2 95%   BMI 22.23 kg/m²   Smoking Status Former   BSA 1.78 m²       DASI Risk Score      Flowsheet Row Pre-Admission Testing from 8/5/2025 in [facility] Clinical Support from 7/29/2025 in [facility]   Can you take care of yourself (eat, dress, bathe, or use toilet)?  2.75 filed at 08/05/2025 0935 2.75 filed at 07/29/2025 0915   Can you walk indoors, such as around your house? 1.75 filed at 08/05/2025 0935 1.75 filed at 07/29/2025 0915   Can you " walk a block or two on level ground?  2.75 filed at 08/05/2025 0935 0 filed at 07/29/2025 0915   Can you climb a flight of stairs or walk up a hill? 5.5 filed at 08/05/2025 0935 0 filed at 07/29/2025 0915   Can you run a short distance? 0 filed at 08/05/2025 0935 0 filed at 07/29/2025 0915   Can you do light work around the house like dusting or washing dishes? 2.7 filed at 08/05/2025 0935 2.7 filed at 07/29/2025 0915   Can you do moderate work around the house like vacuuming, sweeping floors or carrying groceries? 3.5 filed at 08/05/2025 0935 0 filed at 07/29/2025 0915   Can you do heavy work around the house like scrubbing floors or lifting and moving heavy furniture?  8 filed at 08/05/2025 0935 0 filed at 07/29/2025 0915   Can you do yard work like raking leaves, weeding or pushing a mower? 0 filed at 08/05/2025 0935 0 filed at 07/29/2025 0915   Can you have sexual relations? 0 filed at 08/05/2025 0935 0 filed at 07/29/2025 0915   Can you participate in moderate recreational activities like golf, bowling, dancing, doubles tennis or throwing a baseball or football? 0 filed at 08/05/2025 0935 0 filed at 07/29/2025 0915   Can you participate in strenous sports like swimming, singles tennis, football, basketball, or skiing? 0 filed at 08/05/2025 0935 0 filed at 07/29/2025 0915   DASI SCORE 26.95 filed at 08/05/2025 0935 7.2 filed at 07/29/2025 0915   METS Score (Will be calculated only when all the questions are answered) 6.1 filed at 08/05/2025 0935 3.6 filed at 07/29/2025 0915     Caprini DVT Assessment      Flowsheet Row Pre-Admission Testing from 8/5/2025 in [facility] Pre-Admission Testing from 9/9/2024 in  [facility]   DVT Score (IF A SCORE IS NOT CALCULATING, MUST SELECT A BMI TO COMPLETE) 8 filed at 08/05/2025 1022 9 filed at 09/09/2024 0827   Surgical Factors Major surgery planned, lasting over 3 hours filed at 08/05/2025 1022 --   BMI (BMI MUST BE CHOSEN) 30 or less filed at  08/05/2025 1022 30 or less filed at 09/09/2024 0827   RETIRED: Current Status -- Major surgery planned, lasting 2-3 hours, Major surgery planned, lasting over 3 hours, COPD filed at 09/09/2024 0827   RETIRED: History -- Prior major surgery, COPD filed at 09/09/2024 0827   RETIRED: Age -- 60-75 years filed at 09/09/2024 0827     Modified Frailty Index      Flowsheet Row Pre-Admission Testing from 8/5/2025 in [facility]   Non-independent functional status (problems with dressing, bathing, personal grooming, or cooking) 0 filed at 08/05/2025 1023   History of diabetes mellitus  0 filed at 08/05/2025 1023   History of COPD 0.0909 filed at 08/05/2025 1023   History of CHF No filed at 08/05/2025 1023   History of MI 0 filed at 08/05/2025 1023   History of Percutaneous Coronary Intervention, Cardiac Surgery, or Angina No filed at 08/05/2025 1023   Hypertension requiring the use of medication  0.0909 filed at 08/05/2025 1023   Peripheral vascular disease 0.0909 filed at 08/05/2025 1023   Impaired sensorium (cognitive impairement or loss, clouding, or delirium) 0 filed at 08/05/2025 1023   TIA or CVA withouy residual deficit 0 filed at 08/05/2025 1023   Cerebrovascular accident with deficit 0 filed at 08/05/2025 1023   Modified Frailty Index Calculator .2727 filed at 08/05/2025 1023     [id number] Stroke Risk Points  Current as of just now        N/A 0 to 9 Points:      Last Change: N/A          The [id number] risk score (Lip NEYDA, et al. 2009. © 2010 [employer]) quantifies the risk of stroke for a patient with atrial fibrillation. For patients without atrial fibrillation or under the age of 18 this score appears as N/A. Higher score values generally indicate higher risk of stroke.        This score is not applicable to this patient. Components are not calculated.          Revised Cardiac Risk Index      Flowsheet Row Pre-Admission Testing from 8/5/2025 in [facility] Pre-Admission Testing from 9/9/2024 in  [facility]   High-Risk Surgery (Intraperitoneal, Intrathoracic,Suprainguinal vascular) 1 filed at 08/05/2025 1020 1 filed at 09/09/2024 0828   History of ischemic heart disease (History of MI, History of positive exercuse test, Current chest paint considered due to myocardial ischemia, Use of nitrate therapy, ECG with pathological Q Waves) 1 filed at 08/05/2025 1020 1 filed at 09/09/2024 0828   History of congestive heart failure (pulmonary edemia, bilateral rales or S3 gallop, Paroxysmal nocturnal dyspnea, CXR showing pulmonary vascular redistribution) 0 filed at 08/05/2025 1020 0 filed at 09/09/2024 0828   History of cerebrovascular disease (Prior TIA or stroke) 0 filed at 08/05/2025 1020 0 filed at 09/09/2024 0828   Pre-operative insulin treatment 0 filed at 08/05/2025 1020 0 filed at 09/09/2024 0828   Pre-operative creatinine>2 mg/dl 0 filed at 08/05/2025 1020 0 filed at 09/09/2024 0828   Revised Cardiac Risk Calculator 2 filed at 08/05/2025 1020 2 filed at 09/09/2024 0828     Apfel Simplified Score      Flowsheet Row Pre-Admission Testing from 8/5/2025 in [facility] Pre-Admission Testing from 9/9/2024 in  [facility]   Smoking status 1 filed at 08/05/2025 1023 0 filed at 09/09/2024 0828   History of motion sickness or PONV  0 filed at 08/05/2025 1023 0 filed at 09/09/2024 0828   Use of postoperative opioids 1 filed at 08/05/2025 1023 1 filed at 09/09/2024 0828   Gender - Female 0=No filed at 08/05/2025 1023 0=No filed at 09/09/2024 0828   Apfel Simplified Score Calculator 2 filed at 08/05/2025 1023 1 filed at 09/09/2024 0828     Risk Analysis Index Results This Encounter    No data found in the last 10 encounters.       Stop Bang Score      Flowsheet Row Pre-Admission Testing from 8/5/2025 in [facility] Admission (Discharged) from 9/20/2024 in [facility] OR with Marvin Marte, DO   Do you  snore loudly? 0 filed at 08/05/2025 0935 0 filed at 09/20/2024 0616   Do you often feel tired or fatigued after your sleep? 0 filed at 08/05/2025 0935 0 filed at 09/20/2024 0616   Has anyone ever observed you stop breathing in your sleep? 0 filed at 08/05/2025 0935 0 filed at 09/20/2024 0616   Do you have or are you being treated for high blood pressure? 1 filed at 08/05/2025 0935 1 filed at 09/20/2024 0616   Recent BMI (Calculated) 21.8 filed at 08/05/2025 0935 24.1 filed at 09/20/2024 0616   Is BMI greater than 35 kg/m2? 0=No filed at 08/05/2025 0935 0=No filed at 09/20/2024 0616   Age older than 50 years old? 1=Yes filed at 08/05/2025 0935 1=Yes filed at 09/20/2024 0616   Is your neck circumference greater than 17 inches (Male) or 16 inches (Female)? 0 filed at 08/05/2025 0935 0 filed at 09/20/2024 0616   Gender - Male 1=Yes filed at 08/05/2025 0935 1=Yes filed at 09/20/2024 0616   STOP-BANG Total Score 3 filed at 08/05/2025 0935 3 filed at 09/20/2024 0616     Prodigy: High Risk  Total Score: 23              Prodigy Age Score      Prodigy Gender Score     Prodigy Previous Opioid Use Score           ARISCAT Score for Postoperative Pulmonary Complications      Flowsheet Row Pre-Admission Testing from 8/5/2025 in [facility]   Age Calculated Score 3 filed at 08/05/2025 1022   Preoperative SpO2 8 filed at 08/05/2025 1022   Respiratory infection in the last month Either upper or lower (i.e., URI, bronchitis, pneumonia), with fever and antibiotic treatment 0 filed at 08/05/2025 1022   Preoperative anemia (Hgb less than 10 g/dl) 0 filed at 08/05/2025 1022   Surgical incision  15 filed at 08/05/2025 1022   Duration of surgery  23 filed at 08/05/2025 1022   Emergency Procedure  0 filed at 08/05/2025 1022   ARISCAT Total Score  49 filed at 08/05/2025 1022     Mohamud Perioperative Risk for Myocardial Infarction or Cardiac Arrest (JESSE)    No data to display      Assessment and Plan:   Anesthesia/Airway:  No  "anesthesia complications  The patient denies problems with anesthesia in the past such as PONV, prolonged sedation, awareness, dental damage, aspiration, cardiac arrest, difficult intubation, or unexpected hospital admissions.       Neuro:    #Anxiety, Depression, PTSD, Substance Use Disorder (in remission, opioids. Continued medical marijuana), - medicated with Klonopin (continue), Xanax (continue), Suboxone (continue per specialist instructions).  She reports that they have been in remission for opioid use however they do currently use medicinal marijuana for pain management.  He follows with addiction medicine who provided preoperative instructions for Suboxone. Per Dr Alexi Tang MD LOV \"Plan: Lower Suboxone to 4 mg BID before surgery if possible, and we discussed strategies to do this\".  Patient confirmed awareness of this and states he has been doing as instructed.  No further perioperative intervention.    Patient is at an increased risk for post operative delirium secondary to age >/= 65, depression, and type and duration of surgery.  Preoperative brain exercise educational handout provided to patient.    The patient is at an increased risk for perioperative stroke secondary to preoperative interrupton of antithrombotic, increased age, HTN, HLD, vascular surgery , general anesthesia, and op time >2.5 hours.       HEENT/Airway:    No HEENT diagnosis or significant findings on chart review or clinical presentation and evaluation. No further preoperative testing/intervention indicated at this time.      Cardiovascular:    #Aortoiliac occlusive disease, HTN, HLD, PVD, CAD, RBBB, - medicated with aspirin (continue), and lisinopril (hold 24 hours), atorvastatin (continue), chlorthalidone (continue), amlodipine (continue), Plavix (hold 5 days prior) and follows with vascular, cardiology. BP today is 108/69 and denies cardiovascular symptoms. Physical exam is benign. METS is >4 and scheduled for non-cardiac " "surgery. Seeking Vascular surgery, with history of right to left femoral-femoral bypass grafting using 8mm PTFE performed in 2017 by Dr. Jamari Hill. He also underwent balloon angioplasty of the right EIA/CFA in September 2024 by Dr. Marte.  Seen by Cardiology , office note on 7/8/25 notes \"he is considered to be at acceptable risk for his upcoming aortobifem bypass. He should continue his cardiovascular medications in the perioperative period. \" No additional preoperative testing is currently indicated.    METS are 6.1    RCRI  2 which is 10.1% 30 day risk of MACE (risk for cardiac death, nonfatal myocardial infarction, and nonfactal cardiac arrest    JESSE score which indicates a   0.2 % risk of intraoperative or 30-day postoperative MACE    EKG 6/19/25   Normal sinus rhythm  Right bundle branch block  Abnormal ECG    Echo: 12/27/24  CONCLUSIONS:   1. The left ventricular systolic function is normal, with a Vera's biplane calculated ejection fraction of 64%.   2. Spectral Doppler shows a normal pattern of left ventricular diastolic filling.   3. There is normal right ventricular global systolic function.   4. Aortic valve stenosis is not present.   5. Mild aortic valve regurgitation.   6. Left Ventricular Global Longitudinal Strain - 14.8 %.     Stress Test: 06/30/25  Summary:   1. Correlate with myocardial perfusion imaging results.   2. Normal Stress Test.   3. Nuclear image results are reported separately.  Cardiology Interpretation  1. There is a small fixed perfusion defect in the apical wall. No   reversible perfusion abnormality seen. There is a low probability of   ischemia.   2. Calculated post-stress LV ejection fraction 49% without segmental   wall motion abnormality.     Carotids: 07/07/25  CONCLUSIONS:  Right Carotid: Findings are consistent with less than 50% stenosis of the right proximal internal carotid artery. Right external carotid artery appears patent with no evidence of stenosis. No " evidence of hemodynamically significant stenosis of the right common carotid artery. The right vertebral artery is patent with antegrade flow. No evidence of hemodynamically significant stenosis in the right subclavian artery.  Left Carotid: Findings are consistent with less than 50% stenosis of the left proximal internal carotid artery. Left external carotid artery appears patent with no evidence of stenosis. No evidence of hemodynamically significant stenosis of the left common carotid artery. The left vertebral artery is patent with antegrade flow. No evidence of hemodynamically significant stenosis in the left subclavian artery.    CT Angio AORTA AND BILATERAL ILIOFEMORAL : 05/08/25  IMPRESSION:  1. Fem-fem bypass graft is again noted with severe stenosis at the  anastomosis of the graft and the left common femoral artery which is  similar to the prior examination.  2. 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  3. Three-vessel runoff to the level of the bilateral ankles.  4. No acute abdominopelvic process.  5. Additional stable chronic and incidental findings described above.           Pulmonary:    #COPD, Tobacco Use Disorder -patient reports he quit smoking approximately 31 days ago, he is currently medicated with Wellbutrin to assist with this (continue).  He states his COPD is well-controlled and he has not required use of rescue inhaler, stating he used was approximately 3 weeks ago when triggered by allergies.  He has no hospitalizations/exacerbations in the past year related to COPD.  He does not use supplemental oxygen.  He denies respiratory symptoms today and physical exam is benign.  Preoperative deep breathing educational handout provided to patient.    Patient is at increased risk of perioperative complications secondary to  age > 60, COPD, duration of surgery > 2 hours, types of anesthetic    ARISCAT:    49  points which is a high (42.1%) risk of in-hospital post-op pulmonary  "complications     PRODIGY:  20  points which is a high risk of post op opioid induced respiratory depression episodes    STOP BANG:  3   points which is a intermediate risk for moderate to severe RODGER    Pumonary toilet education discussed, patient also provided deep breathing exercises and incentive spirometry educational handout      Renal:   No renal diagnosis, however patient is at increase risk for perioperative renal complications secondary to  Age equal to or greater than 56, HTN, use of an ace, arb, or NSAID       Endocrine:   No endocrine diagnosis or significant findings on chart review or clinical presentation and evaluation. No further testing or intervention is indicated at this time.      Hematologic/Oncology:      No hematologic diagnosis, however patient is at an increased risk for DVT    Patient confirmed they would accept blood products if necessary.   Preoperative DVT educational handout provided to patient.  Caprini Score:  8  points which is a highest risk of perioperative VTE      Gastrointestinal:   # Dysphagia-patient reports he was followed by GI/ENT approximately 10+ years ago.  He states at that time he had an EGD and was told esophageal dilation was performed.  He states he had minor dysphagia symptoms shortly after and approximately 6 years ago with his last EGD which he was told was negative for any acute findings.  He states that this time his EAT-10 score is a 5, patient to follow with primary care for further intervention.  No intervention prior to procedure.    EAT-10 score of    5  : self-perceived oropharyngeal dysphagia scale (0-40)     Apfel: 2 points 39% risk for post operative N/V      Infectious disease:    #Chronic Hepatitis C,- S/P INF tx and second treatment at a later time which he is unable to recall dates/name.  He states he was told that he was \"cured\".     Prescription provided for CHG body wash and dental rinse. CHG use instructions reviewed and provided to " patient.  Susu screen collected      Musculoskeletal:    #Chronic Pain Disorder, Lumbar radiculopathy, peripheral neuropathy, spinal stenosis of lumbrosacral region,  -no need for further perioperative intervention, patient has history of spine surgeries.      Other:     Hold Vit D supplementation day of surgery  Hold all other vitamins and supplements 7 days prior to surgery  Tylenol okay to continue, please hold Aleve/naproxen/ibuprofen/Excedrin (NSAIDs) for 7 days prior to surgery  No lotion/moisturizers or Deoderant after last shower prior to surgery        Labs ordered  Recent Results (from the past 2 weeks)   Comprehensive Metabolic Panel    Collection Time: 08/05/25  9:55 AM   Result Value Ref Range    Glucose 112 (H) 74 - 99 mg/dL    Sodium 136 136 - 145 mmol/L    Potassium 4.7 3.5 - 5.3 mmol/L    Chloride 103 98 - 107 mmol/L    Bicarbonate 25 21 - 32 mmol/L    Anion Gap 13 10 - 20 mmol/L    Urea Nitrogen 30 (H) 6 - 23 mg/dL    Creatinine 1.80 (H) 0.50 - 1.30 mg/dL    eGFR 39 (L) >60 mL/min/1.73m*2    Calcium 9.5 8.6 - 10.6 mg/dL    Albumin 4.5 3.4 - 5.0 g/dL    Alkaline Phosphatase 45 33 - 136 U/L    Total Protein 6.9 6.4 - 8.2 g/dL    AST 21 9 - 39 U/L    Bilirubin, Total 0.5 0.0 - 1.2 mg/dL    ALT 17 10 - 52 U/L   CBC and Auto Differential    Collection Time: 08/05/25  9:55 AM   Result Value Ref Range    WBC 8.5 4.4 - 11.3 x10*3/uL    nRBC 0.0 0.0 - 0.0 /100 WBCs    RBC 4.01 (L) 4.50 - 5.90 x10*6/uL    Hemoglobin 12.8 (L) 13.5 - 17.5 g/dL    Hematocrit 38.1 (L) 41.0 - 52.0 %    MCV 95 80 - 100 fL    MCH 31.9 26.0 - 34.0 pg    MCHC 33.6 32.0 - 36.0 g/dL    RDW 12.8 11.5 - 14.5 %    Platelets 211 150 - 450 x10*3/uL    Neutrophils % 78.7 40.0 - 80.0 %    Immature Granulocytes %, Automated 0.4 0.0 - 0.9 %    Lymphocytes % 12.9 13.0 - 44.0 %    Monocytes % 5.4 2.0 - 10.0 %    Eosinophils % 1.9 0.0 - 6.0 %    Basophils % 0.7 0.0 - 2.0 %    Neutrophils Absolute 6.70 (H) 1.60 - 5.50 x10*3/uL    Immature  Granulocytes Absolute, Automated 0.03 0.00 - 0.50 x10*3/uL    Lymphocytes Absolute 1.10 0.80 - 3.00 x10*3/uL    Monocytes Absolute 0.46 0.05 - 0.80 x10*3/uL    Eosinophils Absolute 0.16 0.00 - 0.40 x10*3/uL    Basophils Absolute 0.06 0.00 - 0.10 x10*3/uL   Coagulation Screen    Collection Time: 08/05/25  9:55 AM   Result Value Ref Range    Protime 12.0 9.8 - 12.4 seconds    INR 1.1 0.9 - 1.1    aPTT 27 26 - 36 seconds   Type And Screen Is this order related to pregnancy or an upcoming surgery? Yes; Where will this surgery/delivery be performed? [facility] What is the date of the surgery? 8/21/2025; Has this patient ever had a transfusion? No; Has t...    Collection Time: 08/05/25  9:55 AM   Result Value Ref Range    ABO TYPE A     Rh TYPE NEG     ANTIBODY SCREEN NEG            Nely Milton PA-C     Medication instructions and NPO guidelines reviewed with the patient.  All questions or concerns discussed and addressed.   The above clinical summary has been dictated with voice recognition software. It has not been proofread for grammatical errors, typographical mistakes, or other semantic inconsistencies.   All labs/imaging included on this documentation were reviewed/considered in medical decision making for perioperative planning, including labs ordered day of [facility] appointment.             [1]   Past Medical History:  Diagnosis Date    Anxiety     COPD (chronic obstructive pulmonary disease) (Multi)     Depression     Easy bruising     Full dentures     Heart valve disease     Echo 12/2024--Mild aortic valve regurgitation.    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    Personal history of other diseases of the circulatory system 01/21/2020    History of intermittent claudication    Personal history of  "other mental and behavioral disorders     History of depression    PTSD (post-traumatic stress disorder)     needle phobia    Shortness of breath     Vision loss     wears corrective lens   [2]   Past Surgical History:  Procedure Laterality Date    CATARACT EXTRACTION W/  INTRAOCULAR LENS IMPLANT Bilateral     CHOLECYSTECTOMY  09/21/2017    Cholecystectomy    CT ANGIO AORTA AND BILATERAL ILIOFEMORAL RUN OFF INCLUDING WITHOUT CONTRAST IF PERFORMED  01/29/2021    CT AORTA AND BILATERAL ILIOFEMORAL RUNOFF ANGIOGRAM W AND/OR WO IV CONTRAST 1/29/2021 [facility]    CT ANGIO AORTA AND BILATERAL ILIOFEMORAL RUN OFF INCLUDING WITHOUT CONTRAST IF PERFORMED  01/17/2023    CT AORTA AND BILATERAL ILIOFEMORAL RUNOFF ANGIOGRAM W AND/OR WO IV CONTRAST 1/17/2023 [facility] OFF[facility]GAC[facility]    OTHER SURGICAL HISTORY  01/21/2020    Leg surgery    OTHER SURGICAL HISTORY  10/21/2014    Surgery    OTHER SURGICAL HISTORY  07/13/2020    Angioplasty   [3]   Family History  Problem Relation Name Age of Onset    No Known Problems Mother      Emphysema Father     [4]   Allergies  Allergen Reactions    Gabapentin Blurred vision     \"Messes with vision\"    Montelukast Headache     Eye pain      "

## 2025-08-05 NOTE — TELEPHONE ENCOUNTER
Would go ahead and schedule for December for now. During admission, they will monitor lab work post operatively. If acute issue, they can consult Nephrology to see during admission and possibly hospital follow up sooner.

## 2025-08-05 NOTE — TELEPHONE ENCOUNTER
Patient says that he was trying to schedule nephrology but was unable to get in until December. He is also concerned because he will be having vascular surgery on 8/21/25 in [place], and will be in the hospital for days after that procedure.

## 2025-08-05 NOTE — PREPROCEDURE INSTRUCTIONS
Thank you for visiting [facility] ([facility]) today for your pre-procedure evaluation, you were seen by     Nely Milton PA-C   Department of Anesthesiology and Perioperative Medicine  Main phone [phone]  Fax [phone]    This summary includes instructions and information to aid you during your perioperative period.  Please read carefully. If you have any questions about your visit today, please call the number listed above.  If you become ill or have any changes to your health before your surgery, please contact your primary care provider and alert your surgeon.    - If unable to purchase Hibiclens body wash, alternative is the orange liquid Dial soap.  (Each day for 5 days leading up to surgery, do not use on face or genital region)  - If unable to purchase Peridex mouthwash, alternative is original Listerine mouthwash. (Night before and morning of surgery)    General Medications Instructions (see back for further medication instructions)  Hold Vit D supplementation day of surgery  Hold all other vitamins and supplements 7 days prior to surgery  Tylenol okay to continue, please hold Aleve/naproxen/ibuprofen (NSAIDs) for 7 days prior to surgery  No lotion/moisturizers or Deoderant after last shower prior to surgery    You will be called business day prior to surgery to confirm arrival time.     Preparing for Surgery       Preoperative Fasting Guidelines  Why must I stop eating and drinking near surgery time?  With sedation, food or liquid in your stomach can enter your lungs causing serious complications  Food can increase nausea and vomiting  When do I need to stop eating and drinking before my surgery?  Do not eat any food after midnight the night before your surgery/procedure.  You may have up to 13.5 ounces of clear liquid until TWO hours before your instructed arrival time to the hospital.  This includes water, black tea/coffee, (no milk or cream) apple juice, and  "electrolyte drinks (Gatorade)  You may chew gum until TWO hours before your surgery/procedure    Tobacco and Alcohol;  Do not drink alcohol or smoke within 24 hours of surgery.  It is best to quit smoking for as long as possible before any surgery or procedure.     Other Instructions  Why did I have my nose, under my arms, and groin swabbed? The purpose of the swab is to identify Staphylococcus aureus inside your nose or on your skin.  The swab was sent to the laboratory for culture.  A positive swab/culture for Staphylococcus aureus is called colonization or carriage.   What is Staphylococcus aureus? Staphylococcus aureus, also known as \"staph\", is a germ found on the skin or in the nose of healthy people.  Sometimes Staphylococcus aureus can get into the body and cause an infection.  This can be minor (such as pimples, boils, or other skin problems).  It might also be serious (such as a blood infection, pneumonia, or a surgical site infection). What is Staphylococcus aureus colonization or carriage? Colonization or carriage means that a person has the germ but is not sick from it.  These bacteria can be spread on the hands or when breathing or sneezing. How is Staphylococcus aureus spread? It is most often spread by close contact with a person or item that carries it. What happens if my culture is positive for Staphylococcus aureus? Your doctor/medical team will use this information to guide any antibiotic treatment which may be necessary.  Regardless of the culture results, we will clean the inside of your nose with a betadine swab just before you have your surgery. Will I get an infection if I have Staphylococcus aureus in my nose or on my skin? Anyone can get an infection with Staphylococcus aureus.  However, the best way to reduce your risk of infection is to follow the instructions provided to you for the use of your CHG soap and dental rinse.  , Body Wash; What is a home preoperative antibacterial shower? " This shower is a way of cleaning the skin with a germ-killing solution before surgery.  The solution contains chlorhexidine, commonly known as CHG.  CHG is a skin cleanser with germ-killing ability.  Let your doctor know if you are allergic to chlorhexidine. Why do I need to take a preoperative antibacterial shower? Skin is not sterile.  It is best to try to make your skin as free of germs as possible before surgery.  Proper cleansing with a germ-killing soap before surgery can lower the number of germs on your skin.  This helps to reduce the risk of infection at the surgical site.  Following the instructions listed below will help you prepare your skin for surgery.   How do I use the solution? Steps:  Begin using your CHG soap 5 days before your scheduled surgery on ___________.   First, wash and rinse your hair using the CHG soap. Keep CHG soap away from ear canals and eyes.  Rinse completely, do not condition.  Hair extensions should be removed. , Oral/Dental Rinse: What is oral/dental rinse?  It is a mouthwash. It is a way of cleaning the mouth with a germ-killing solution before your surgery.  The solution contains chlorhexidine, commonly known as CHG. It is used inside the mouth to kill a bacteria known as Staphylococcus aureus.  Let your doctor know if you are allergic to Chlorhexidine. Why do I need to use CHG oral/dental rinse? The CHG oral/dental rinse helps to kill a bacteria in your mouth known as Staphylococcus aureus.  This reduces the risk of infection at the surgical site.  Using your CHG oral/dental rinse STEPS: Use your CHG oral/dental rinse after you brush your teeth the night before (at bedtime) and the morning of your surgery.  Follow all directions on your prescription label.  Use the cap on the container to measure 15 ml.  Swish (gargle if you can) the mouthwash in your mouth for at least 30 seconds, (do not swallow) and spit out.  After you use your CHG rinse, do not rinse your mouth with  water, drink or eat.  Please refer to the prescription label for the appropriate time to resume oral intake What side effects might I have using the CHG oral/dental rinse? CHG rinse will stick to plaque on the teeth.  Brush and floss just before use.  Teeth brushing will help avoid staining of plaque during use.       The Week before Surgery        Seven days before Surgery  Check your CPM medication instructions  Do the exercises provided to you by CPM   Arrange for a responsible, adult licensed  to take you home after surgery and stay with you for 24 hours.  You will not be permitted to drive yourself home if you have received any anesthetic/sedation  Five days before surgery  Check your [facility] medication instructions  Do the exercises provided to you by CPM   Start using Chlorhexidene (CHG) body wash if prescribed (Continue till day of surgery)      The Day before Surgery       Check your [facility] medication and all other CPM instructions including when to stop eating and drinking  You will be called with your arrival time for surgery in the late afternoon.  If you do not receive a call please reach out to your surgeon's office.  Do not smoke or drink 24 hours before surgery  Prepare items to bring with you to the hospital  Shower with your chlorhexidine wash if prescribed  Brush your teeth and use your chlorhexidine dental rinse if prescribed    The Day of Surgery       Check your [facility] medication instructions  Shower, if prescribed use CHG.  Do not apply any lotions, creams, moisturizers, perfume or deodorant  Brush your teeth and use your CHG dental rinse if prescribed  Wear loose comfortable clothing  Avoid make-up  Remove  jewelry and piercings, consider professional piercing removal with a plastic spacer if needed  Bring photo ID and Insurance card  Bring an accurate medication list that includes medication dose, frequency and allergies  Bring a copy of your advanced directives (will, health care power of  )  Bring any devices and controllers as well as medical devices you have been provided with for surgery (CPAP, slings, braces, etc.)  Dentures, eyeglasses, and contacts will be removed before surgery, please bring cases for contacts or glasses    Preoperative Deep Breathing Exercises    Why it is important to do deep breathing exercises before my surgery?  Deep breathing exercises strengthen your breathing muscles.  This helps you to recover after your surgery and decreases the chance of breathing complications.    How are the deep breathing exercises done?  Sit straight with your back supported.  Breathe in deeply and slowly through your nose. Your lower rib cage should expand and your abdomen may move forward.  Hold that breath for 3 to 5 seconds.  Breathe out through pursed lips, slowly and completely.  Rest and repeat 10 times every hour while awake.  Rest longer if you become dizzy or lightheaded.      Preoperative Brain Exercises    What are brain exercises?  A brain exercise is any activity that engages your thinking (cognitive) skills.    What types of activities are considered brain exercises?  Jigsaw puzzles, crossword puzzles, word jumble, memory games, word search, and many more.  Many can be found free online or on your phone via a mobile toby.    Why should I do brain exercises before my surgery?  More recent research has shown brain exercise before surgery can lower the risk of postoperative delirium (confusion) which can be especially important for older adults.  Patients who did brain exercises for 5 to 10 hours the days before surgery, cut their risk of postoperative delirium in half up to 1 week after surgery.    Sit-to-Stand Exercise    What is the sit-to-stand exercise?  The sit-to-stand exercise strengthens the muscles of your lower body and muscles in the center of your body (core muscles for stability) helping to maintain and improve your strength and mobility.  How do I do the  sit-to-stand exercise?  The goal is to do this exercise without using your arms or hands.  If this is too difficult, use your arms and hands or a chair with armrests to help slowly push yourself to the standing position and lower yourself back to the sitting position. As the movement becomes easier use your arms and hands less.    Steps to the sit-to-stand exercise  Sit up tall in a sturdy chair, knees bent, feet flat on the floor shoulder-width apart.  Shift your hips/pelvis forward in the chair to correctly position yourself for the next movement.  Lean forward at your hips.  Stand up straight putting equal weight on both feet.  Check to be sure you are properly aligned with the chair, in a slow controlled movement sit back down.  Repeat this exercise 10-15 times.  If needed you can do it fewer times until your strength improves.  Rest for 1 minute.  Do another 10-15 sit-to-stand exercises.  Try to do this in the morning and evening.       Simple things you can do to help prevent blood clots     Blood clots are blockages that can form in the body's veins. When a blood clot forms in your deep veins, it may be called a deep vein thrombosis, or DVT for short. Blood clots can happen in any part of the body where blood flows, but they are most common in the arms and legs. If a piece of a blood clot breaks free and travels to the lungs, it is called a pulmonary embolus (PE). A PE can be a very serious problem.      Being in the hospital or having surgery can raise your chances of getting a blood clot because you may not be well enough to move around as much as you normally do.         Ways you can help prevent blood clots in the hospital       Wearing SCDs  SCDs stands for Sequential Compression Devices.   SCDs are special sleeves that wrap around your legs. They attach to a pump that fills them with air to gently squeeze your legs every few minutes.  This helps return the blood in your legs to your heart.   SCDs should  only be taken off when walking or bathing. SCDs may not be comfortable, but they can help save your life.              Pump SCD leg sleeves  Wearing compression stockings - if your doctor orders them. These special snug-fitting stockings gently squeeze your legs to help blood flow.       Walking. Walking helps move the blood in your legs.   If your doctor says it is ok, try walking the halls at least   5 times a day. Ask us to help you get up, so you don't fall.      Taking any blood-thinning medicines your doctor orders.              Ways you can help prevent blood clots at home         Wearing compression stockings - if your doctor orders them.   Walking - to help move the blood in your legs.    Taking any blood-thinning medicines your doctor orders.      Signs of a blood clot or PE    Tell your doctor or nurse right away if you have any of the problems listed below.         If you are at home, seek medical care right away. Call 911 for chest pain or problems breathing.            Signs of a blood clot (DVT) - such as pain, swelling, redness, or warmth in your arm or legs.  Signs of a pulmonary embolism (PE) - such as chest pain or feeling short of breath

## 2025-08-06 LAB — STAPHYLOCOCCUS SPEC CULT: NORMAL

## 2025-08-07 ENCOUNTER — APPOINTMENT (OUTPATIENT)
Facility: HOSPITAL | Age: 75
End: 2025-08-07
Payer: MEDICARE

## 2025-08-20 ENCOUNTER — ANESTHESIA EVENT (OUTPATIENT)
Dept: OPERATING ROOM | Facility: HOSPITAL | Age: 75
End: 2025-08-20
Payer: MEDICARE

## 2025-08-21 ENCOUNTER — HOSPITAL ENCOUNTER (INPATIENT)
Facility: HOSPITAL | Age: 75
End: 2025-08-21
Attending: SURGERY | Admitting: SURGERY
Payer: MEDICARE

## 2025-08-21 ENCOUNTER — ANESTHESIA (OUTPATIENT)
Dept: OPERATING ROOM | Facility: HOSPITAL | Age: 75
End: 2025-08-21
Payer: MEDICARE

## 2025-08-21 PROCEDURE — 2500000004 HC RX 250 GENERAL PHARMACY W/ HCPCS (ALT 636 FOR OP/ED): Mod: JZ,TB | Performed by: STUDENT IN AN ORGANIZED HEALTH CARE EDUCATION/TRAINING PROGRAM

## 2025-08-21 PROCEDURE — 2500000001 HC RX 250 WO HCPCS SELF ADMINISTERED DRUGS (ALT 637 FOR MEDICARE OP): Performed by: STUDENT IN AN ORGANIZED HEALTH CARE EDUCATION/TRAINING PROGRAM

## 2025-08-21 PROCEDURE — P9045 ALBUMIN (HUMAN), 5%, 250 ML: HCPCS | Mod: JZ,TB | Performed by: STUDENT IN AN ORGANIZED HEALTH CARE EDUCATION/TRAINING PROGRAM

## 2025-08-21 PROCEDURE — 2500000005 HC RX 250 GENERAL PHARMACY W/O HCPCS: Performed by: STUDENT IN AN ORGANIZED HEALTH CARE EDUCATION/TRAINING PROGRAM

## 2025-08-21 PROCEDURE — 2500000004 HC RX 250 GENERAL PHARMACY W/ HCPCS (ALT 636 FOR OP/ED)

## 2025-08-21 PROCEDURE — 2500000004 HC RX 250 GENERAL PHARMACY W/ HCPCS (ALT 636 FOR OP/ED): Performed by: ANESTHESIOLOGY

## 2025-08-21 PROCEDURE — C9248 INJ, CLEVIDIPINE BUTYRATE: HCPCS | Mod: JW,TB | Performed by: STUDENT IN AN ORGANIZED HEALTH CARE EDUCATION/TRAINING PROGRAM

## 2025-08-21 PROCEDURE — 36430 TRANSFUSION BLD/BLD COMPNT: CPT | Mod: GC

## 2025-08-21 RX ORDER — SODIUM BICARBONATE 1 MEQ/ML
SYRINGE (ML) INTRAVENOUS AS NEEDED
Status: DISCONTINUED | OUTPATIENT
Start: 2025-08-21 | End: 2025-08-21

## 2025-08-21 RX ORDER — ALBUMIN HUMAN 50 G/1000ML
SOLUTION INTRAVENOUS AS NEEDED
Status: DISCONTINUED | OUTPATIENT
Start: 2025-08-21 | End: 2025-08-21

## 2025-08-21 RX ORDER — MIDAZOLAM HYDROCHLORIDE 2 MG/2ML
INJECTION, SOLUTION INTRAMUSCULAR; INTRAVENOUS AS NEEDED
Status: DISCONTINUED | OUTPATIENT
Start: 2025-08-21 | End: 2025-08-21

## 2025-08-21 RX ORDER — METHADONE HYDROCHLORIDE 10 MG/ML
INJECTION, SOLUTION INTRAMUSCULAR; INTRAVENOUS; SUBCUTANEOUS AS NEEDED
Status: DISCONTINUED | OUTPATIENT
Start: 2025-08-21 | End: 2025-08-21

## 2025-08-21 RX ORDER — PROTAMINE SULFATE 10 MG/ML
INJECTION, SOLUTION INTRAVENOUS AS NEEDED
Status: DISCONTINUED | OUTPATIENT
Start: 2025-08-21 | End: 2025-08-21

## 2025-08-21 RX ORDER — NOREPINEPHRINE BITARTRATE 0.03 MG/ML
INJECTION, SOLUTION INTRAVENOUS CONTINUOUS PRN
Status: DISCONTINUED | OUTPATIENT
Start: 2025-08-21 | End: 2025-08-21

## 2025-08-21 RX ORDER — GLYCOPYRROLATE 0.2 MG/ML
INJECTION INTRAMUSCULAR; INTRAVENOUS AS NEEDED
Status: DISCONTINUED | OUTPATIENT
Start: 2025-08-21 | End: 2025-08-21

## 2025-08-21 RX ORDER — METOPROLOL TARTRATE 1 MG/ML
INJECTION, SOLUTION INTRAVENOUS AS NEEDED
Status: DISCONTINUED | OUTPATIENT
Start: 2025-08-21 | End: 2025-08-21

## 2025-08-21 RX ORDER — HEPARIN SODIUM 1000 [USP'U]/ML
INJECTION, SOLUTION INTRAVENOUS; SUBCUTANEOUS AS NEEDED
Status: DISCONTINUED | OUTPATIENT
Start: 2025-08-21 | End: 2025-08-21

## 2025-08-21 RX ORDER — ROCURONIUM BROMIDE 10 MG/ML
INJECTION, SOLUTION INTRAVENOUS AS NEEDED
Status: DISCONTINUED | OUTPATIENT
Start: 2025-08-21 | End: 2025-08-21

## 2025-08-21 RX ORDER — PHENYLEPHRINE HYDROCHLORIDE 10 MG/ML
INJECTION INTRAVENOUS AS NEEDED
Status: DISCONTINUED | OUTPATIENT
Start: 2025-08-21 | End: 2025-08-21

## 2025-08-21 RX ORDER — MANNITOL 20 G/100ML
INJECTION, SOLUTION INTRAVENOUS AS NEEDED
Status: DISCONTINUED | OUTPATIENT
Start: 2025-08-21 | End: 2025-08-21

## 2025-08-21 RX ORDER — PROPOFOL 10 MG/ML
INJECTION, EMULSION INTRAVENOUS AS NEEDED
Status: DISCONTINUED | OUTPATIENT
Start: 2025-08-21 | End: 2025-08-21

## 2025-08-21 RX ORDER — FUROSEMIDE 10 MG/ML
INJECTION INTRAMUSCULAR; INTRAVENOUS AS NEEDED
Status: DISCONTINUED | OUTPATIENT
Start: 2025-08-21 | End: 2025-08-21

## 2025-08-21 RX ORDER — ACETAMINOPHEN 325 MG/1
TABLET ORAL AS NEEDED
Status: DISCONTINUED | OUTPATIENT
Start: 2025-08-21 | End: 2025-08-21

## 2025-08-21 RX ORDER — LIDOCAINE HYDROCHLORIDE 20 MG/ML
INJECTION, SOLUTION INFILTRATION; PERINEURAL AS NEEDED
Status: DISCONTINUED | OUTPATIENT
Start: 2025-08-21 | End: 2025-08-21

## 2025-08-21 RX ORDER — CEFAZOLIN 1 G/1
INJECTION, POWDER, FOR SOLUTION INTRAVENOUS AS NEEDED
Status: DISCONTINUED | OUTPATIENT
Start: 2025-08-21 | End: 2025-08-21

## 2025-08-21 RX ORDER — CALCIUM CHLORIDE INJECTION 100 MG/ML
INJECTION, SOLUTION INTRAVENOUS AS NEEDED
Status: DISCONTINUED | OUTPATIENT
Start: 2025-08-21 | End: 2025-08-21

## 2025-08-21 RX ORDER — TRANEXAMIC ACID 1 G/10ML
INJECTION, SOLUTION INTRAVENOUS AS NEEDED
Status: DISCONTINUED | OUTPATIENT
Start: 2025-08-21 | End: 2025-08-21

## 2025-08-21 RX ORDER — DEXMEDETOMIDINE HYDROCHLORIDE 4 UG/ML
INJECTION, SOLUTION INTRAVENOUS CONTINUOUS PRN
Status: DISCONTINUED | OUTPATIENT
Start: 2025-08-21 | End: 2025-08-21

## 2025-08-21 RX ADMIN — TAZOBACTAM SODIUM AND PIPERACILLIN SODIUM 2.25 G: 250; 2 INJECTION, SOLUTION INTRAVENOUS at 17:50

## 2025-08-21 RX ADMIN — PHENYLEPHRINE HYDROCHLORIDE 120 MCG: 10 INJECTION INTRAVENOUS at 10:20

## 2025-08-21 RX ADMIN — PHENYLEPHRINE HYDROCHLORIDE 80 MCG: 10 INJECTION INTRAVENOUS at 15:13

## 2025-08-21 RX ADMIN — CEFAZOLIN 2 G: 1 INJECTION, POWDER, FOR SOLUTION INTRAMUSCULAR; INTRAVENOUS at 13:00

## 2025-08-21 RX ADMIN — ROCURONIUM BROMIDE 20 MG: 10 INJECTION INTRAVENOUS at 15:11

## 2025-08-21 RX ADMIN — PHENYLEPHRINE HYDROCHLORIDE 80 MCG: 10 INJECTION INTRAVENOUS at 11:29

## 2025-08-21 RX ADMIN — SODIUM BICARBONATE 50 MEQ: 84 INJECTION INTRAVENOUS at 12:31

## 2025-08-21 RX ADMIN — PROTAMINE SULFATE 70 MG: 10 INJECTION, SOLUTION INTRAVENOUS at 16:28

## 2025-08-21 RX ADMIN — ALBUMIN HUMAN 250 ML: 0.05 INJECTION, SOLUTION INTRAVENOUS at 10:09

## 2025-08-21 RX ADMIN — FUROSEMIDE 10 MG: 10 INJECTION, SOLUTION INTRAMUSCULAR; INTRAVENOUS at 09:07

## 2025-08-21 RX ADMIN — PHENYLEPHRINE HYDROCHLORIDE 120 MCG: 10 INJECTION INTRAVENOUS at 18:17

## 2025-08-21 RX ADMIN — PROPOFOL 20 MG: 10 INJECTION, EMULSION INTRAVENOUS at 11:52

## 2025-08-21 RX ADMIN — CALCIUM CHLORIDE 0.2 G: 100 INJECTION, SOLUTION INTRAVENOUS at 12:49

## 2025-08-21 RX ADMIN — GLYCOPYRROLATE 0.2 MG: 0.2 SOLUTION INTRAMUSCULAR; INTRAVENOUS at 09:25

## 2025-08-21 RX ADMIN — CALCIUM CHLORIDE 0.2 G: 100 INJECTION, SOLUTION INTRAVENOUS at 12:35

## 2025-08-21 RX ADMIN — DEXMEDETOMIDINE HYDROCHLORIDE 0.4 MCG/KG/HR: 4 INJECTION, SOLUTION INTRAVENOUS at 08:50

## 2025-08-21 RX ADMIN — METHADONE HYDROCHLORIDE 10 MG: 10 INJECTION, SOLUTION INTRAMUSCULAR; INTRAVENOUS; SUBCUTANEOUS at 09:35

## 2025-08-21 RX ADMIN — MANNITOL 31.9 MG: 20 INJECTION, SOLUTION INTRAVENOUS at 09:18

## 2025-08-21 RX ADMIN — METHADONE HYDROCHLORIDE 10 MG: 10 INJECTION, SOLUTION INTRAMUSCULAR; INTRAVENOUS; SUBCUTANEOUS at 09:21

## 2025-08-21 RX ADMIN — SODIUM CHLORIDE: 0.9 INJECTION, SOLUTION INTRAVENOUS at 08:45

## 2025-08-21 RX ADMIN — KETAMINE HYDROCHLORIDE 0.25 MG/KG/HR: 100 INJECTION INTRAMUSCULAR; INTRAVENOUS at 10:41

## 2025-08-21 RX ADMIN — SODIUM CHLORIDE, SODIUM LACTATE, POTASSIUM CHLORIDE, AND CALCIUM CHLORIDE: 600; 310; 30; 20 INJECTION, SOLUTION INTRAVENOUS at 07:48

## 2025-08-21 RX ADMIN — MIDAZOLAM HYDROCHLORIDE 1 MG: 2 INJECTION, SOLUTION INTRAMUSCULAR; INTRAVENOUS at 07:59

## 2025-08-21 RX ADMIN — ALBUMIN HUMAN 250 ML: 0.05 INJECTION, SOLUTION INTRAVENOUS at 14:15

## 2025-08-21 RX ADMIN — PHENYLEPHRINE HYDROCHLORIDE 80 MCG: 10 INJECTION INTRAVENOUS at 09:56

## 2025-08-21 RX ADMIN — PROPOFOL 30 MG: 10 INJECTION, EMULSION INTRAVENOUS at 09:02

## 2025-08-21 RX ADMIN — CALCIUM CHLORIDE 0.2 G: 100 INJECTION, SOLUTION INTRAVENOUS at 12:53

## 2025-08-21 RX ADMIN — CLEVIPIDINE 1 MG/HR: 0.5 EMULSION INTRAVENOUS at 12:07

## 2025-08-21 RX ADMIN — METOPROLOL TARTRATE 5 MG: 1 INJECTION, SOLUTION INTRAVENOUS at 08:01

## 2025-08-21 RX ADMIN — ROCURONIUM BROMIDE 20 MG: 10 INJECTION INTRAVENOUS at 17:00

## 2025-08-21 RX ADMIN — HEPARIN SODIUM 4000 UNITS: 1000 INJECTION INTRAVENOUS; SUBCUTANEOUS at 11:45

## 2025-08-21 RX ADMIN — DEXAMETHASONE SODIUM PHOSPHATE 4 MG: 4 INJECTION INTRA-ARTICULAR; INTRALESIONAL; INTRAMUSCULAR; INTRAVENOUS; SOFT TISSUE at 09:20

## 2025-08-21 RX ADMIN — PHENYLEPHRINE HYDROCHLORIDE 80 MCG: 10 INJECTION INTRAVENOUS at 11:46

## 2025-08-21 RX ADMIN — HEPARIN SODIUM 2000 UNITS: 1000 INJECTION INTRAVENOUS; SUBCUTANEOUS at 15:10

## 2025-08-21 RX ADMIN — PROPOFOL 20 MG: 10 INJECTION, EMULSION INTRAVENOUS at 12:22

## 2025-08-21 RX ADMIN — PHENYLEPHRINE HYDROCHLORIDE 120 MCG: 10 INJECTION INTRAVENOUS at 14:40

## 2025-08-21 RX ADMIN — CALCIUM CHLORIDE 0.2 G: 100 INJECTION, SOLUTION INTRAVENOUS at 12:44

## 2025-08-21 RX ADMIN — ALBUMIN HUMAN 250 ML: 0.05 INJECTION, SOLUTION INTRAVENOUS at 16:21

## 2025-08-21 RX ADMIN — PHENYLEPHRINE HYDROCHLORIDE 80 MCG: 10 INJECTION INTRAVENOUS at 13:16

## 2025-08-21 RX ADMIN — CALCIUM CHLORIDE 1 G: 100 INJECTION, SOLUTION INTRAVENOUS at 17:36

## 2025-08-21 RX ADMIN — PHENYLEPHRINE HYDROCHLORIDE 80 MCG: 10 INJECTION INTRAVENOUS at 16:30

## 2025-08-21 RX ADMIN — CALCIUM CHLORIDE 0.2 G: 100 INJECTION, SOLUTION INTRAVENOUS at 12:37

## 2025-08-21 RX ADMIN — PHENYLEPHRINE HYDROCHLORIDE 80 MCG: 10 INJECTION INTRAVENOUS at 08:38

## 2025-08-21 RX ADMIN — CLEVIPIDINE 0.25 MG: 0.5 EMULSION INTRAVENOUS at 11:52

## 2025-08-21 RX ADMIN — PROPOFOL 20 MG: 10 INJECTION, EMULSION INTRAVENOUS at 12:16

## 2025-08-21 RX ADMIN — CEFAZOLIN 2 G: 1 INJECTION, POWDER, FOR SOLUTION INTRAMUSCULAR; INTRAVENOUS at 08:55

## 2025-08-21 RX ADMIN — HEPARIN SODIUM 4000 UNITS: 1000 INJECTION INTRAVENOUS; SUBCUTANEOUS at 13:05

## 2025-08-21 RX ADMIN — PHENYLEPHRINE HYDROCHLORIDE 80 MCG: 10 INJECTION INTRAVENOUS at 14:36

## 2025-08-21 RX ADMIN — ACETAMINOPHEN 975 MG: 325 TABLET, FILM COATED ORAL at 07:45

## 2025-08-21 RX ADMIN — SODIUM BICARBONATE 50 MEQ: 84 INJECTION INTRAVENOUS at 15:56

## 2025-08-21 RX ADMIN — Medication 0.08 MCG/KG/MIN: at 10:26

## 2025-08-21 RX ADMIN — ROCURONIUM BROMIDE 50 MG: 10 INJECTION INTRAVENOUS at 07:59

## 2025-08-21 RX ADMIN — PHENYLEPHRINE HYDROCHLORIDE 80 MCG: 10 INJECTION INTRAVENOUS at 09:38

## 2025-08-21 RX ADMIN — PROPOFOL 110 MG: 10 INJECTION, EMULSION INTRAVENOUS at 07:59

## 2025-08-21 RX ADMIN — PHENYLEPHRINE HYDROCHLORIDE 80 MCG: 10 INJECTION INTRAVENOUS at 13:42

## 2025-08-21 RX ADMIN — HEPARIN SODIUM 2000 UNITS: 1000 INJECTION INTRAVENOUS; SUBCUTANEOUS at 14:11

## 2025-08-21 RX ADMIN — LIDOCAINE HYDROCHLORIDE 100 ML: 20 INJECTION, SOLUTION INFILTRATION; PERINEURAL at 07:59

## 2025-08-21 RX ADMIN — PHENYLEPHRINE HYDROCHLORIDE 80 MCG: 10 INJECTION INTRAVENOUS at 09:14

## 2025-08-21 RX ADMIN — ROCURONIUM BROMIDE 20 MG: 10 INJECTION INTRAVENOUS at 13:00

## 2025-08-21 RX ADMIN — SODIUM BICARBONATE 50 MEQ: 84 INJECTION INTRAVENOUS at 15:51

## 2025-08-21 RX ADMIN — CEFAZOLIN 2 G: 1 INJECTION, POWDER, FOR SOLUTION INTRAMUSCULAR; INTRAVENOUS at 17:00

## 2025-08-21 RX ADMIN — SODIUM BICARBONATE 50 MEQ: 84 INJECTION INTRAVENOUS at 12:11

## 2025-08-21 RX ADMIN — MIDAZOLAM HYDROCHLORIDE 1 MG: 2 INJECTION, SOLUTION INTRAMUSCULAR; INTRAVENOUS at 16:30

## 2025-08-21 RX ADMIN — ALBUMIN HUMAN 500 ML: 0.05 INJECTION, SOLUTION INTRAVENOUS at 15:28

## 2025-08-21 RX ADMIN — PHENYLEPHRINE HYDROCHLORIDE 80 MCG: 10 INJECTION INTRAVENOUS at 12:49

## 2025-08-21 RX ADMIN — PHENYLEPHRINE HYDROCHLORIDE 80 MCG: 10 INJECTION INTRAVENOUS at 17:27

## 2025-08-21 RX ADMIN — TRANEXAMIC ACID 650 MG: 100 INJECTION INTRAVENOUS at 11:02

## 2025-08-21 ASSESSMENT — PAIN SCALES - GENERAL: PAINLEVEL_OUTOF10: 8

## 2025-08-21 ASSESSMENT — PAIN - FUNCTIONAL ASSESSMENT
PAIN_FUNCTIONAL_ASSESSMENT: CPOT (CRITICAL CARE PAIN OBSERVATION TOOL)
PAIN_FUNCTIONAL_ASSESSMENT: CPOT (CRITICAL CARE PAIN OBSERVATION TOOL)
PAIN_FUNCTIONAL_ASSESSMENT: 0-10

## 2025-08-22 ENCOUNTER — ANESTHESIA EVENT (OUTPATIENT)
Dept: OPERATING ROOM | Facility: HOSPITAL | Age: 75
End: 2025-08-22
Payer: MEDICARE

## 2025-08-22 ENCOUNTER — ANESTHESIA (OUTPATIENT)
Dept: OPERATING ROOM | Facility: HOSPITAL | Age: 75
End: 2025-08-22
Payer: MEDICARE

## 2025-08-22 PROCEDURE — P9045 ALBUMIN (HUMAN), 5%, 250 ML: HCPCS | Mod: JZ,TB | Performed by: ANESTHESIOLOGIST ASSISTANT

## 2025-08-22 PROCEDURE — 2500000004 HC RX 250 GENERAL PHARMACY W/ HCPCS (ALT 636 FOR OP/ED): Performed by: ANESTHESIOLOGIST ASSISTANT

## 2025-08-22 PROCEDURE — 2500000005 HC RX 250 GENERAL PHARMACY W/O HCPCS: Performed by: ANESTHESIOLOGIST ASSISTANT

## 2025-08-22 PROCEDURE — 2500000004 HC RX 250 GENERAL PHARMACY W/ HCPCS (ALT 636 FOR OP/ED)

## 2025-08-22 RX ORDER — NOREPINEPHRINE BITARTRATE 0.03 MG/ML
INJECTION, SOLUTION INTRAVENOUS CONTINUOUS PRN
Status: DISCONTINUED | OUTPATIENT
Start: 2025-08-22 | End: 2025-08-22

## 2025-08-22 RX ORDER — NITROGLYCERIN 40 MG/100ML
INJECTION INTRAVENOUS AS NEEDED
Status: DISCONTINUED | OUTPATIENT
Start: 2025-08-22 | End: 2025-08-22

## 2025-08-22 RX ORDER — NEOSTIGMINE METHYLSULFATE 1 MG/ML
INJECTION INTRAVENOUS AS NEEDED
Status: DISCONTINUED | OUTPATIENT
Start: 2025-08-22 | End: 2025-08-22

## 2025-08-22 RX ORDER — GLYCOPYRROLATE 0.2 MG/ML
INJECTION INTRAMUSCULAR; INTRAVENOUS AS NEEDED
Status: DISCONTINUED | OUTPATIENT
Start: 2025-08-22 | End: 2025-08-22

## 2025-08-22 RX ORDER — ALBUMIN HUMAN 50 G/1000ML
SOLUTION INTRAVENOUS AS NEEDED
Status: DISCONTINUED | OUTPATIENT
Start: 2025-08-22 | End: 2025-08-22

## 2025-08-22 RX ORDER — ROCURONIUM BROMIDE 10 MG/ML
INJECTION, SOLUTION INTRAVENOUS AS NEEDED
Status: DISCONTINUED | OUTPATIENT
Start: 2025-08-22 | End: 2025-08-22

## 2025-08-22 RX ADMIN — ALBUMIN HUMAN 250 ML: 0.05 INJECTION, SOLUTION INTRAVENOUS at 10:25

## 2025-08-22 RX ADMIN — ROCURONIUM BROMIDE 10 MG: 10 INJECTION, SOLUTION INTRAVENOUS at 12:07

## 2025-08-22 RX ADMIN — Medication 0.08 MCG/KG/MIN: at 10:25

## 2025-08-22 RX ADMIN — NEOSTIGMINE METHYLSULFATE 3 MG: 1 INJECTION INTRAVENOUS at 13:22

## 2025-08-22 RX ADMIN — PIPERACILLIN SODIUM AND TAZOBACTAM SODIUM 3.38 G: 3; .375 INJECTION, SOLUTION INTRAVENOUS at 10:27

## 2025-08-22 RX ADMIN — NITROGLYCERIN 50 MCG: 10 INJECTION INTRAVENOUS at 10:39

## 2025-08-22 RX ADMIN — NOREPINEPHRINE BITARTRATE 32 MCG: 1 INJECTION, SOLUTION, CONCENTRATE INTRAVENOUS at 10:22

## 2025-08-22 RX ADMIN — SODIUM CHLORIDE, SODIUM LACTATE, POTASSIUM CHLORIDE, AND CALCIUM CHLORIDE: 600; 310; 30; 20 INJECTION, SOLUTION INTRAVENOUS at 10:15

## 2025-08-22 RX ADMIN — ALBUMIN HUMAN 250 ML: 0.05 INJECTION, SOLUTION INTRAVENOUS at 10:39

## 2025-08-22 RX ADMIN — VANCOMYCIN HYDROCHLORIDE 1 G: 1 INJECTION, POWDER, LYOPHILIZED, FOR SOLUTION INTRAVENOUS at 10:51

## 2025-08-22 RX ADMIN — ROCURONIUM BROMIDE 50 MG: 10 INJECTION, SOLUTION INTRAVENOUS at 10:16

## 2025-08-22 RX ADMIN — GLYCOPYRROLATE 0.4 MG: 0.2 INJECTION INTRAMUSCULAR; INTRAVENOUS at 13:21

## 2025-08-22 RX ADMIN — NOREPINEPHRINE BITARTRATE 32 MCG: 1 INJECTION, SOLUTION, CONCENTRATE INTRAVENOUS at 10:18

## 2025-08-22 RX ADMIN — NITROGLYCERIN 50 MCG: 10 INJECTION INTRAVENOUS at 10:34

## 2025-08-22 RX ADMIN — PROPOFOL 30 MG: 10 INJECTION, EMULSION INTRAVENOUS at 10:14

## 2025-08-22 ASSESSMENT — PAIN - FUNCTIONAL ASSESSMENT
PAIN_FUNCTIONAL_ASSESSMENT: CPOT (CRITICAL CARE PAIN OBSERVATION TOOL)

## 2025-08-23 ENCOUNTER — APPOINTMENT (OUTPATIENT)
Dept: RADIOLOGY | Facility: HOSPITAL | Age: 75
End: 2025-08-23
Payer: MEDICARE

## 2025-08-23 PROCEDURE — 71045 X-RAY EXAM CHEST 1 VIEW: CPT

## 2025-08-23 ASSESSMENT — RESPIRATORY DISTRESS OBSERVATION SCALE (RDOS)
LOOK OF FEAR: 0 - NONE
PARADOXICAL BREATHING PATTERN: 0 - NONE
RDOS TOTAL SCORE: 4
INVOLUNTARY NASAL FLARING: 0 - NONE
PARADOXICAL BREATHING PATTERN: 0 - NONE
RESTLESS NONPURPOSEFUL MOVEMENTS: 2 - FREQUENT MOVEMENTS
RESTLESS NONPURPOSEFUL MOVEMENTS: 2 - FREQUENT MOVEMENTS
RESPIRATORY RATE PER MINUTE: 1 - 19-30 BREATHS
RDOS TOTAL SCORE: 4
RESPIRATORY RATE PER MINUTE: 1 - 19-30 BREATHS
INVOLUNTARY NASAL FLARING: 0 - NONE
ACCESSORY MUSCLE RISE IN CLAVICLE DURING INSPIRATION: 0 - NONE
GRUNTING AT END OF EXPIRATION: 0 - NONE
HEART RATE PER MINUTE: 1 - 90-109 BEATS
ACCESSORY MUSCLE RISE IN CLAVICLE DURING INSPIRATION: 0 - NONE
GRUNTING AT END OF EXPIRATION: 0 - NONE
HEART RATE PER MINUTE: 1 - 90-109 BEATS
INVOLUNTARY NASAL FLARING: 0 - NONE
GRUNTING AT END OF EXPIRATION: 0 - NONE
RESTLESS NONPURPOSEFUL MOVEMENTS: 2 - FREQUENT MOVEMENTS
HEART RATE PER MINUTE: 1 - 90-109 BEATS
ACCESSORY MUSCLE RISE IN CLAVICLE DURING INSPIRATION: 0 - NONE
PARADOXICAL BREATHING PATTERN: 0 - NONE
RESTLESS NONPURPOSEFUL MOVEMENTS: 1 - OCCASIONAL, SLIGHT MOVEMENTS
RESPIRATORY RATE PER MINUTE: 1 - 19-30 BREATHS
PARADOXICAL BREATHING PATTERN: 0 - NONE
RESPIRATORY RATE PER MINUTE: 1 - 19-30 BREATHS
ACCESSORY MUSCLE RISE IN CLAVICLE DURING INSPIRATION: 0 - NONE
LOOK OF FEAR: 0 - NONE
HEART RATE PER MINUTE: 1 - 90-109 BEATS
INVOLUNTARY NASAL FLARING: 0 - NONE
GRUNTING AT END OF EXPIRATION: 0 - NONE
RDOS TOTAL SCORE: 4
LOOK OF FEAR: 0 - NONE
RDOS TOTAL SCORE: 3
LOOK OF FEAR: 0 - NONE

## 2025-08-23 ASSESSMENT — PAIN - FUNCTIONAL ASSESSMENT
PAIN_FUNCTIONAL_ASSESSMENT: CPOT (CRITICAL CARE PAIN OBSERVATION TOOL)
PAIN_FUNCTIONAL_ASSESSMENT: 0-10
PAIN_FUNCTIONAL_ASSESSMENT: CPOT (CRITICAL CARE PAIN OBSERVATION TOOL)
PAIN_FUNCTIONAL_ASSESSMENT: 0-10
PAIN_FUNCTIONAL_ASSESSMENT: CPOT (CRITICAL CARE PAIN OBSERVATION TOOL)
PAIN_FUNCTIONAL_ASSESSMENT: 0-10
PAIN_FUNCTIONAL_ASSESSMENT: CPOT (CRITICAL CARE PAIN OBSERVATION TOOL)
PAIN_FUNCTIONAL_ASSESSMENT: 0-10
PAIN_FUNCTIONAL_ASSESSMENT: CPOT (CRITICAL CARE PAIN OBSERVATION TOOL)
PAIN_FUNCTIONAL_ASSESSMENT: CPOT (CRITICAL CARE PAIN OBSERVATION TOOL)
PAIN_FUNCTIONAL_ASSESSMENT: 0-10
PAIN_FUNCTIONAL_ASSESSMENT: CPOT (CRITICAL CARE PAIN OBSERVATION TOOL)
PAIN_FUNCTIONAL_ASSESSMENT: 0-10
PAIN_FUNCTIONAL_ASSESSMENT: CPOT (CRITICAL CARE PAIN OBSERVATION TOOL)
PAIN_FUNCTIONAL_ASSESSMENT: CPOT (CRITICAL CARE PAIN OBSERVATION TOOL)
PAIN_FUNCTIONAL_ASSESSMENT: 0-10

## 2025-08-23 ASSESSMENT — PAIN DESCRIPTION - DESCRIPTORS
DESCRIPTORS: ACHING
DESCRIPTORS: ACHING;DISCOMFORT
DESCRIPTORS: ACHING
DESCRIPTORS: ACHING;DISCOMFORT
DESCRIPTORS: ACHING
DESCRIPTORS: ACHING

## 2025-08-23 ASSESSMENT — PAIN DESCRIPTION - LOCATION
LOCATION: ABDOMEN

## 2025-08-23 ASSESSMENT — PAIN SCALES - GENERAL
PAINLEVEL_OUTOF10: 7
PAINLEVEL_OUTOF10: 10 - WORST POSSIBLE PAIN
PAINLEVEL_OUTOF10: 8
PAINLEVEL_OUTOF10: 7
PAINLEVEL_OUTOF10: 8

## 2025-08-23 ASSESSMENT — PAIN DESCRIPTION - ORIENTATION
ORIENTATION: MID

## 2025-08-24 ENCOUNTER — APPOINTMENT (OUTPATIENT)
Dept: RADIOLOGY | Facility: HOSPITAL | Age: 75
End: 2025-08-24
Payer: MEDICARE

## 2025-08-24 PROCEDURE — 71045 X-RAY EXAM CHEST 1 VIEW: CPT

## 2025-08-24 ASSESSMENT — RESPIRATORY DISTRESS OBSERVATION SCALE (RDOS)
PARADOXICAL BREATHING PATTERN: 0 - NONE
RDOS TOTAL SCORE: 3
INVOLUNTARY NASAL FLARING: 0 - NONE
HEART RATE PER MINUTE: 1 - 90-109 BEATS
RESPIRATORY RATE PER MINUTE: 1 - 19-30 BREATHS
INVOLUNTARY NASAL FLARING: 0 - NONE
RDOS TOTAL SCORE: 3
LOOK OF FEAR: 0 - NONE
LOOK OF FEAR: 0 - NONE
RESPIRATORY RATE PER MINUTE: 1 - 19-30 BREATHS
PARADOXICAL BREATHING PATTERN: 0 - NONE
RESPIRATORY RATE PER MINUTE: 1 - 19-30 BREATHS
HEART RATE PER MINUTE: 1 - 90-109 BEATS
RESTLESS NONPURPOSEFUL MOVEMENTS: 1 - OCCASIONAL, SLIGHT MOVEMENTS
GRUNTING AT END OF EXPIRATION: 0 - NONE
PARADOXICAL BREATHING PATTERN: 0 - NONE
ACCESSORY MUSCLE RISE IN CLAVICLE DURING INSPIRATION: 0 - NONE
RESTLESS NONPURPOSEFUL MOVEMENTS: 1 - OCCASIONAL, SLIGHT MOVEMENTS
HEART RATE PER MINUTE: 1 - 90-109 BEATS
RESTLESS NONPURPOSEFUL MOVEMENTS: 1 - OCCASIONAL, SLIGHT MOVEMENTS
HEART RATE PER MINUTE: 1 - 90-109 BEATS
GRUNTING AT END OF EXPIRATION: 0 - NONE
LOOK OF FEAR: 0 - NONE
ACCESSORY MUSCLE RISE IN CLAVICLE DURING INSPIRATION: 0 - NONE
LOOK OF FEAR: 0 - NONE
RESTLESS NONPURPOSEFUL MOVEMENTS: 1 - OCCASIONAL, SLIGHT MOVEMENTS
RESTLESS NONPURPOSEFUL MOVEMENTS: 1 - OCCASIONAL, SLIGHT MOVEMENTS
LOOK OF FEAR: 0 - NONE
HEART RATE PER MINUTE: 1 - 90-109 BEATS
RESTLESS NONPURPOSEFUL MOVEMENTS: 1 - OCCASIONAL, SLIGHT MOVEMENTS
RDOS TOTAL SCORE: 3
RESPIRATORY RATE PER MINUTE: 1 - 19-30 BREATHS
RDOS TOTAL SCORE: 3
RESPIRATORY RATE PER MINUTE: 1 - 19-30 BREATHS
RDOS TOTAL SCORE: 3
INVOLUNTARY NASAL FLARING: 0 - NONE
HEART RATE PER MINUTE: 1 - 90-109 BEATS
RDOS TOTAL SCORE: 3
RDOS TOTAL SCORE: 3
ACCESSORY MUSCLE RISE IN CLAVICLE DURING INSPIRATION: 0 - NONE
INVOLUNTARY NASAL FLARING: 0 - NONE
INVOLUNTARY NASAL FLARING: 0 - NONE
PARADOXICAL BREATHING PATTERN: 0 - NONE
LOOK OF FEAR: 0 - NONE
PARADOXICAL BREATHING PATTERN: 0 - NONE
PARADOXICAL BREATHING PATTERN: 0 - NONE
GRUNTING AT END OF EXPIRATION: 0 - NONE
GRUNTING AT END OF EXPIRATION: 0 - NONE
ACCESSORY MUSCLE RISE IN CLAVICLE DURING INSPIRATION: 0 - NONE
GRUNTING AT END OF EXPIRATION: 0 - NONE
INVOLUNTARY NASAL FLARING: 0 - NONE
INVOLUNTARY NASAL FLARING: 0 - NONE
HEART RATE PER MINUTE: 1 - 90-109 BEATS
PARADOXICAL BREATHING PATTERN: 0 - NONE
GRUNTING AT END OF EXPIRATION: 0 - NONE
GRUNTING AT END OF EXPIRATION: 0 - NONE
RESTLESS NONPURPOSEFUL MOVEMENTS: 1 - OCCASIONAL, SLIGHT MOVEMENTS
LOOK OF FEAR: 0 - NONE
ACCESSORY MUSCLE RISE IN CLAVICLE DURING INSPIRATION: 0 - NONE

## 2025-08-24 ASSESSMENT — PAIN SCALES - GENERAL
PAINLEVEL_OUTOF10: 5 - MODERATE PAIN
PAINLEVEL_OUTOF10: 7
PAINLEVEL_OUTOF10: 7
PAINLEVEL_OUTOF10: 8
PAINLEVEL_OUTOF10: 7
PAINLEVEL_OUTOF10: 7
PAINLEVEL_OUTOF10: 8
PAINLEVEL_OUTOF10: 5 - MODERATE PAIN
PAINLEVEL_OUTOF10: 5 - MODERATE PAIN
PAINLEVEL_OUTOF10: 7
PAINLEVEL_OUTOF10: 7
PAINLEVEL_OUTOF10: 5 - MODERATE PAIN
PAINLEVEL_OUTOF10: 8
PAINLEVEL_OUTOF10: 5 - MODERATE PAIN

## 2025-08-24 ASSESSMENT — PAIN DESCRIPTION - LOCATION
LOCATION: ABDOMEN
LOCATION: ABDOMEN

## 2025-08-24 ASSESSMENT — PAIN DESCRIPTION - DESCRIPTORS
DESCRIPTORS: ACHING
DESCRIPTORS: ACHING;DISCOMFORT
DESCRIPTORS: ACHING

## 2025-08-24 ASSESSMENT — PAIN DESCRIPTION - ORIENTATION
ORIENTATION: MID
ORIENTATION: MID

## 2025-08-24 ASSESSMENT — ACTIVITIES OF DAILY LIVING (ADL): LACK_OF_TRANSPORTATION: NO

## 2025-08-25 ENCOUNTER — ANESTHESIA (OUTPATIENT)
Dept: CARDIOVASCULAR ICU | Facility: HOSPITAL | Age: 75
End: 2025-08-25
Payer: MEDICARE

## 2025-08-25 ENCOUNTER — ANESTHESIA EVENT (OUTPATIENT)
Dept: CARDIOVASCULAR ICU | Facility: HOSPITAL | Age: 75
End: 2025-08-25
Payer: MEDICARE

## 2025-08-25 SDOH — SOCIAL STABILITY: SOCIAL INSECURITY: ARE THERE ANY APPARENT SIGNS OF INJURIES/BEHAVIORS THAT COULD BE RELATED TO ABUSE/NEGLECT?: NO

## 2025-08-25 SDOH — ECONOMIC STABILITY: HOUSING INSECURITY: DO YOU FEEL UNSAFE GOING BACK TO THE PLACE WHERE YOU LIVE?: YES

## 2025-08-25 SDOH — SOCIAL STABILITY: SOCIAL INSECURITY: HAVE YOU HAD THOUGHTS OF HARMING ANYONE ELSE?: YES

## 2025-08-25 SDOH — SOCIAL STABILITY: SOCIAL INSECURITY: HAVE YOU HAD ANY THOUGHTS OF HARMING ANYONE ELSE?: NO

## 2025-08-25 SDOH — SOCIAL STABILITY: SOCIAL INSECURITY: WERE YOU ABLE TO COMPLETE ALL THE BEHAVIORAL HEALTH SCREENINGS?: YES

## 2025-08-25 SDOH — SOCIAL STABILITY: SOCIAL INSECURITY: ARE YOU OR HAVE YOU BEEN THREATENED OR ABUSED PHYSICALLY, EMOTIONALLY, OR SEXUALLY BY ANYONE?: NO

## 2025-08-25 SDOH — SOCIAL STABILITY: SOCIAL INSECURITY: ABUSE: ADULT

## 2025-08-25 SDOH — SOCIAL STABILITY: SOCIAL INSECURITY: DO YOU FEEL UNSAFE GOING BACK TO THE PLACE WHERE YOU ARE LIVING?: NO

## 2025-08-25 SDOH — SOCIAL STABILITY: SOCIAL INSECURITY: DOES ANYONE TRY TO KEEP YOU FROM HAVING/CONTACTING OTHER FRIENDS OR DOING THINGS OUTSIDE YOUR HOME?: NO

## 2025-08-25 SDOH — SOCIAL STABILITY: SOCIAL INSECURITY: DO YOU FEEL ANYONE HAS EXPLOITED OR TAKEN ADVANTAGE OF YOU FINANCIALLY OR OF YOUR PERSONAL PROPERTY?: NO

## 2025-08-25 SDOH — SOCIAL STABILITY: SOCIAL INSECURITY: HAS ANYONE EVER THREATENED TO HURT YOUR FAMILY OR YOUR PETS?: NO

## 2025-08-25 SDOH — SOCIAL STABILITY: SOCIAL INSECURITY
ASK PARENT OR GUARDIAN: ARE THERE TIMES WHEN YOU, YOUR CHILD(REN), OR ANY MEMBER OF YOUR HOUSEHOLD FEEL UNSAFE, HARMED, OR THREATENED AROUND PERSONS WITH WHOM YOU KNOW OR LIVE?: NO

## 2025-08-25 ASSESSMENT — COGNITIVE AND FUNCTIONAL STATUS - GENERAL
MOVING FROM LYING ON BACK TO SITTING ON SIDE OF FLAT BED WITH BEDRAILS: A LITTLE
TURNING FROM BACK TO SIDE WHILE IN FLAT BAD: A LITTLE
PERSONAL GROOMING: A LITTLE
TURNING FROM BACK TO SIDE WHILE IN FLAT BAD: A LITTLE
PERSONAL GROOMING: A LITTLE
DRESSING REGULAR UPPER BODY CLOTHING: A LITTLE
WALKING IN HOSPITAL ROOM: A LITTLE
DRESSING REGULAR LOWER BODY CLOTHING: A LITTLE
DAILY ACTIVITIY SCORE: 18
MOBILITY SCORE: 18
CLIMB 3 TO 5 STEPS WITH RAILING: A LOT
DRESSING REGULAR LOWER BODY CLOTHING: A LITTLE
WALKING IN HOSPITAL ROOM: A LITTLE
MOVING FROM LYING ON BACK TO SITTING ON SIDE OF FLAT BED WITH BEDRAILS: A LITTLE
TOILETING: A LITTLE
MOVING TO AND FROM BED TO CHAIR: A LITTLE
DAILY ACTIVITIY SCORE: 21
MOVING TO AND FROM BED TO CHAIR: A LITTLE
STANDING UP FROM CHAIR USING ARMS: A LITTLE
TOILETING: A LOT
MOBILITY SCORE: 17
STANDING UP FROM CHAIR USING ARMS: A LITTLE
HELP NEEDED FOR BATHING: A LITTLE
CLIMB 3 TO 5 STEPS WITH RAILING: A LITTLE

## 2025-08-25 ASSESSMENT — ACTIVITIES OF DAILY LIVING (ADL)
DRESSING YOURSELF: INDEPENDENT
HEARING - LEFT EAR: FUNCTIONAL
ADLS_ADDRESSED: NO
JUDGMENT_ADEQUATE_SAFELY_COMPLETE_DAILY_ACTIVITIES: YES
HEARING - RIGHT EAR: FUNCTIONAL
ADL_ASSISTANCE: INDEPENDENT
BATHING_ASSISTANCE: MINIMAL
BATHING: INDEPENDENT
PATIENT'S MEMORY ADEQUATE TO SAFELY COMPLETE DAILY ACTIVITIES?: YES
WALKS IN HOME: INDEPENDENT
FEEDING YOURSELF: INDEPENDENT
ADL_ASSISTANCE: INDEPENDENT
GROOMING: INDEPENDENT
ADEQUATE_TO_COMPLETE_ADL: YES
TOILETING: INDEPENDENT

## 2025-08-25 ASSESSMENT — LIFESTYLE VARIABLES
AUDIT-C TOTAL SCORE: 2
HOW OFTEN DO YOU HAVE A DRINK CONTAINING ALCOHOL: MONTHLY OR LESS
AUDIT-C TOTAL SCORE: 2
HOW MANY STANDARD DRINKS CONTAINING ALCOHOL DO YOU HAVE ON A TYPICAL DAY: 1 OR 2
SKIP TO QUESTIONS 9-10: 0
HOW OFTEN DO YOU HAVE 6 OR MORE DRINKS ON ONE OCCASION: LESS THAN MONTHLY

## 2025-08-25 ASSESSMENT — PAIN SCALES - GENERAL
PAINLEVEL_OUTOF10: 3
PAINLEVEL_OUTOF10: 2
PAINLEVEL_OUTOF10: 0 - NO PAIN
PAINLEVEL_OUTOF10: 6
PAINLEVEL_OUTOF10: 5 - MODERATE PAIN
PAINLEVEL_OUTOF10: 5 - MODERATE PAIN

## 2025-08-25 ASSESSMENT — PATIENT HEALTH QUESTIONNAIRE - PHQ9
2. FEELING DOWN, DEPRESSED OR HOPELESS: NOT AT ALL
1. LITTLE INTEREST OR PLEASURE IN DOING THINGS: NOT AT ALL
SUM OF ALL RESPONSES TO PHQ9 QUESTIONS 1 & 2: 0

## 2025-08-25 ASSESSMENT — PAIN - FUNCTIONAL ASSESSMENT
PAIN_FUNCTIONAL_ASSESSMENT: 0-10

## 2025-08-26 ENCOUNTER — APPOINTMENT (OUTPATIENT)
Dept: RADIOLOGY | Facility: HOSPITAL | Age: 75
End: 2025-08-26
Payer: MEDICARE

## 2025-08-26 PROCEDURE — 71045 X-RAY EXAM CHEST 1 VIEW: CPT

## 2025-08-26 ASSESSMENT — COGNITIVE AND FUNCTIONAL STATUS - GENERAL
HELP NEEDED FOR BATHING: A LITTLE
STANDING UP FROM CHAIR USING ARMS: A LITTLE
TURNING FROM BACK TO SIDE WHILE IN FLAT BAD: A LITTLE
CLIMB 3 TO 5 STEPS WITH RAILING: A LITTLE
DRESSING REGULAR UPPER BODY CLOTHING: A LITTLE
DAILY ACTIVITIY SCORE: 20
DRESSING REGULAR UPPER BODY CLOTHING: A LITTLE
MOBILITY SCORE: 18
HELP NEEDED FOR BATHING: A LITTLE
DAILY ACTIVITIY SCORE: 20
MOVING FROM LYING ON BACK TO SITTING ON SIDE OF FLAT BED WITH BEDRAILS: A LITTLE
CLIMB 3 TO 5 STEPS WITH RAILING: A LITTLE
MOVING TO AND FROM BED TO CHAIR: A LITTLE
TURNING FROM BACK TO SIDE WHILE IN FLAT BAD: A LITTLE
TOILETING: A LITTLE
WALKING IN HOSPITAL ROOM: A LITTLE
WALKING IN HOSPITAL ROOM: A LITTLE
MOVING TO AND FROM BED TO CHAIR: A LITTLE
MOBILITY SCORE: 18
TOILETING: A LITTLE
DRESSING REGULAR LOWER BODY CLOTHING: A LITTLE
DRESSING REGULAR LOWER BODY CLOTHING: A LITTLE
STANDING UP FROM CHAIR USING ARMS: A LITTLE
MOVING FROM LYING ON BACK TO SITTING ON SIDE OF FLAT BED WITH BEDRAILS: A LITTLE

## 2025-08-26 ASSESSMENT — PAIN - FUNCTIONAL ASSESSMENT
PAIN_FUNCTIONAL_ASSESSMENT: 0-10

## 2025-08-26 ASSESSMENT — PAIN SCALES - GENERAL
PAINLEVEL_OUTOF10: 8
PAINLEVEL_OUTOF10: 5 - MODERATE PAIN
PAINLEVEL_OUTOF10: 0 - NO PAIN

## 2025-08-27 ENCOUNTER — APPOINTMENT (OUTPATIENT)
Dept: RADIOLOGY | Facility: HOSPITAL | Age: 75
End: 2025-08-27
Payer: MEDICARE

## 2025-08-27 PROCEDURE — 71045 X-RAY EXAM CHEST 1 VIEW: CPT

## 2025-08-27 ASSESSMENT — COGNITIVE AND FUNCTIONAL STATUS - GENERAL
CLIMB 3 TO 5 STEPS WITH RAILING: A LITTLE
DAILY ACTIVITIY SCORE: 21
STANDING UP FROM CHAIR USING ARMS: A LITTLE
TOILETING: A LITTLE
DRESSING REGULAR LOWER BODY CLOTHING: A LITTLE
MOBILITY SCORE: 20
WALKING IN HOSPITAL ROOM: A LITTLE
MOBILITY SCORE: 20
MOVING TO AND FROM BED TO CHAIR: A LITTLE
TURNING FROM BACK TO SIDE WHILE IN FLAT BAD: A LITTLE
CLIMB 3 TO 5 STEPS WITH RAILING: A LITTLE
TOILETING: A LITTLE
DRESSING REGULAR LOWER BODY CLOTHING: A LITTLE
MOVING TO AND FROM BED TO CHAIR: A LITTLE
MOBILITY SCORE: 17
HELP NEEDED FOR BATHING: A LITTLE
WALKING IN HOSPITAL ROOM: A LITTLE
STANDING UP FROM CHAIR USING ARMS: A LITTLE
DAILY ACTIVITIY SCORE: 21
STANDING UP FROM CHAIR USING ARMS: A LITTLE
CLIMB 3 TO 5 STEPS WITH RAILING: A LOT
MOVING TO AND FROM BED TO CHAIR: A LITTLE
HELP NEEDED FOR BATHING: A LITTLE
WALKING IN HOSPITAL ROOM: A LITTLE
MOVING FROM LYING ON BACK TO SITTING ON SIDE OF FLAT BED WITH BEDRAILS: A LITTLE

## 2025-08-27 ASSESSMENT — PAIN - FUNCTIONAL ASSESSMENT
PAIN_FUNCTIONAL_ASSESSMENT: 0-10

## 2025-08-27 ASSESSMENT — PAIN SCALES - GENERAL
PAINLEVEL_OUTOF10: 8
PAINLEVEL_OUTOF10: 7
PAINLEVEL_OUTOF10: 7

## 2025-08-28 ASSESSMENT — COGNITIVE AND FUNCTIONAL STATUS - GENERAL
WALKING IN HOSPITAL ROOM: A LITTLE
TURNING FROM BACK TO SIDE WHILE IN FLAT BAD: A LITTLE
STANDING UP FROM CHAIR USING ARMS: A LITTLE
DRESSING REGULAR LOWER BODY CLOTHING: A LITTLE
CLIMB 3 TO 5 STEPS WITH RAILING: A LITTLE
STANDING UP FROM CHAIR USING ARMS: A LITTLE
MOVING TO AND FROM BED TO CHAIR: A LITTLE
DAILY ACTIVITIY SCORE: 20
CLIMB 3 TO 5 STEPS WITH RAILING: A LITTLE
MOBILITY SCORE: 19
WALKING IN HOSPITAL ROOM: A LITTLE
TOILETING: A LITTLE
TURNING FROM BACK TO SIDE WHILE IN FLAT BAD: A LITTLE
DRESSING REGULAR UPPER BODY CLOTHING: A LITTLE
HELP NEEDED FOR BATHING: A LITTLE
DAILY ACTIVITIY SCORE: 20
DRESSING REGULAR LOWER BODY CLOTHING: A LITTLE
DRESSING REGULAR UPPER BODY CLOTHING: A LITTLE
HELP NEEDED FOR BATHING: A LITTLE
MOVING TO AND FROM BED TO CHAIR: A LITTLE
TOILETING: A LITTLE
MOBILITY SCORE: 19

## 2025-08-28 ASSESSMENT — PAIN SCALES - GENERAL
PAINLEVEL_OUTOF10: 5 - MODERATE PAIN
PAINLEVEL_OUTOF10: 4

## 2025-08-28 ASSESSMENT — PAIN - FUNCTIONAL ASSESSMENT: PAIN_FUNCTIONAL_ASSESSMENT: 0-10

## 2025-08-29 ENCOUNTER — ANESTHESIA (OUTPATIENT)
Dept: OPERATING ROOM | Facility: HOSPITAL | Age: 75
End: 2025-08-29
Payer: MEDICARE

## 2025-08-29 ENCOUNTER — ANESTHESIA EVENT (OUTPATIENT)
Dept: OPERATING ROOM | Facility: HOSPITAL | Age: 75
End: 2025-08-29
Payer: MEDICARE

## 2025-08-29 PROBLEM — K56.600 PARTIAL INTESTINAL OBSTRUCTION (MULTI): Status: ACTIVE | Noted: 2025-07-02

## 2025-08-29 PROCEDURE — 2500000004 HC RX 250 GENERAL PHARMACY W/ HCPCS (ALT 636 FOR OP/ED): Performed by: STUDENT IN AN ORGANIZED HEALTH CARE EDUCATION/TRAINING PROGRAM

## 2025-08-29 PROCEDURE — 2500000005 HC RX 250 GENERAL PHARMACY W/O HCPCS: Performed by: STUDENT IN AN ORGANIZED HEALTH CARE EDUCATION/TRAINING PROGRAM

## 2025-08-29 PROCEDURE — 2500000004 HC RX 250 GENERAL PHARMACY W/ HCPCS (ALT 636 FOR OP/ED): Performed by: NURSE PRACTITIONER

## 2025-08-29 RX ORDER — PROPOFOL 10 MG/ML
INJECTION, EMULSION INTRAVENOUS AS NEEDED
Status: DISCONTINUED | OUTPATIENT
Start: 2025-08-29 | End: 2025-08-29

## 2025-08-29 RX ORDER — SUCCINYLCHOLINE CHLORIDE 20 MG/ML
INJECTION INTRAMUSCULAR; INTRAVENOUS AS NEEDED
Status: DISCONTINUED | OUTPATIENT
Start: 2025-08-29 | End: 2025-08-29

## 2025-08-29 RX ORDER — HYDROMORPHONE HYDROCHLORIDE 0.2 MG/ML
INJECTION INTRAMUSCULAR; INTRAVENOUS; SUBCUTANEOUS AS NEEDED
Status: DISCONTINUED | OUTPATIENT
Start: 2025-08-29 | End: 2025-08-29

## 2025-08-29 RX ORDER — LIDOCAINE HYDROCHLORIDE 20 MG/ML
INJECTION, SOLUTION INFILTRATION; PERINEURAL AS NEEDED
Status: DISCONTINUED | OUTPATIENT
Start: 2025-08-29 | End: 2025-08-29

## 2025-08-29 RX ORDER — MIDAZOLAM HYDROCHLORIDE 2 MG/2ML
INJECTION, SOLUTION INTRAMUSCULAR; INTRAVENOUS AS NEEDED
Status: DISCONTINUED | OUTPATIENT
Start: 2025-08-29 | End: 2025-08-29

## 2025-08-29 RX ORDER — FENTANYL CITRATE 50 UG/ML
INJECTION, SOLUTION INTRAMUSCULAR; INTRAVENOUS AS NEEDED
Status: DISCONTINUED | OUTPATIENT
Start: 2025-08-29 | End: 2025-08-29

## 2025-08-29 RX ORDER — ROCURONIUM BROMIDE 10 MG/ML
INJECTION, SOLUTION INTRAVENOUS AS NEEDED
Status: DISCONTINUED | OUTPATIENT
Start: 2025-08-29 | End: 2025-08-29

## 2025-08-29 RX ADMIN — EPHEDRINE SULFATE 5 MG: 50 INJECTION INTRAVENOUS at 12:15

## 2025-08-29 RX ADMIN — FENTANYL CITRATE 50 MCG: 50 INJECTION, SOLUTION INTRAMUSCULAR; INTRAVENOUS at 13:31

## 2025-08-29 RX ADMIN — PIPERACILLIN SODIUM AND TAZOBACTAM SODIUM 3.38 G: 3; .375 INJECTION, SOLUTION INTRAVENOUS at 12:00

## 2025-08-29 RX ADMIN — ROCURONIUM BROMIDE 20 MG: 10 INJECTION, SOLUTION INTRAVENOUS at 11:53

## 2025-08-29 RX ADMIN — FENTANYL CITRATE 50 MCG: 50 INJECTION, SOLUTION INTRAMUSCULAR; INTRAVENOUS at 12:56

## 2025-08-29 RX ADMIN — MIDAZOLAM HYDROCHLORIDE 2 MG: 2 INJECTION, SOLUTION INTRAMUSCULAR; INTRAVENOUS at 10:50

## 2025-08-29 RX ADMIN — FENTANYL CITRATE 75 MCG: 50 INJECTION, SOLUTION INTRAMUSCULAR; INTRAVENOUS at 11:10

## 2025-08-29 RX ADMIN — HYDROMORPHONE HYDROCHLORIDE 0.2 MG: 0.2 INJECTION INTRAMUSCULAR; INTRAVENOUS; SUBCUTANEOUS at 13:36

## 2025-08-29 RX ADMIN — EPHEDRINE SULFATE 7.5 MG: 50 INJECTION INTRAVENOUS at 11:15

## 2025-08-29 RX ADMIN — PROPOFOL 150 MG: 10 INJECTION, EMULSION INTRAVENOUS at 11:11

## 2025-08-29 RX ADMIN — ONDANSETRON 4 MG: 2 INJECTION, SOLUTION INTRAMUSCULAR; INTRAVENOUS at 13:07

## 2025-08-29 RX ADMIN — ROCURONIUM BROMIDE 50 MG: 10 INJECTION, SOLUTION INTRAVENOUS at 11:26

## 2025-08-29 RX ADMIN — SUCCINYLCHOLINE CHLORIDE 150 MG: 20 INJECTION, SOLUTION INTRAMUSCULAR; INTRAVENOUS at 11:11

## 2025-08-29 RX ADMIN — LIDOCAINE HYDROCHLORIDE 70 MG: 20 INJECTION, SOLUTION INFILTRATION; PERINEURAL at 11:10

## 2025-08-29 RX ADMIN — SODIUM CHLORIDE, SODIUM LACTATE, POTASSIUM CHLORIDE, AND CALCIUM CHLORIDE: 600; 310; 30; 20 INJECTION, SOLUTION INTRAVENOUS at 10:51

## 2025-08-29 RX ADMIN — FENTANYL CITRATE 25 MCG: 50 INJECTION, SOLUTION INTRAMUSCULAR; INTRAVENOUS at 11:00

## 2025-08-29 SDOH — HEALTH STABILITY: MENTAL HEALTH: CURRENT SMOKER: 0

## 2025-08-29 ASSESSMENT — PAIN - FUNCTIONAL ASSESSMENT
PAIN_FUNCTIONAL_ASSESSMENT: 0-10
PAIN_FUNCTIONAL_ASSESSMENT: UNABLE TO SELF-REPORT
PAIN_FUNCTIONAL_ASSESSMENT: 0-10

## 2025-08-29 ASSESSMENT — COGNITIVE AND FUNCTIONAL STATUS - GENERAL
DRESSING REGULAR LOWER BODY CLOTHING: A LITTLE
DAILY ACTIVITIY SCORE: 18
STANDING UP FROM CHAIR USING ARMS: A LITTLE
TOILETING: A LITTLE
STANDING UP FROM CHAIR USING ARMS: A LITTLE
WALKING IN HOSPITAL ROOM: A LITTLE
MOVING FROM LYING ON BACK TO SITTING ON SIDE OF FLAT BED WITH BEDRAILS: A LITTLE
HELP NEEDED FOR BATHING: A LITTLE
DAILY ACTIVITIY SCORE: 18
TURNING FROM BACK TO SIDE WHILE IN FLAT BAD: A LITTLE
TURNING FROM BACK TO SIDE WHILE IN FLAT BAD: A LITTLE
CLIMB 3 TO 5 STEPS WITH RAILING: A LITTLE
MOBILITY SCORE: 18
DRESSING REGULAR UPPER BODY CLOTHING: A LITTLE
EATING MEALS: A LITTLE
MOVING TO AND FROM BED TO CHAIR: A LITTLE
TOILETING: A LITTLE
DRESSING REGULAR LOWER BODY CLOTHING: A LITTLE
HELP NEEDED FOR BATHING: A LITTLE
DRESSING REGULAR UPPER BODY CLOTHING: A LITTLE
PERSONAL GROOMING: A LITTLE
WALKING IN HOSPITAL ROOM: A LITTLE
PERSONAL GROOMING: A LITTLE
MOBILITY SCORE: 18
MOVING TO AND FROM BED TO CHAIR: A LITTLE
MOVING FROM LYING ON BACK TO SITTING ON SIDE OF FLAT BED WITH BEDRAILS: A LITTLE
CLIMB 3 TO 5 STEPS WITH RAILING: A LITTLE
EATING MEALS: A LITTLE

## 2025-08-29 ASSESSMENT — PAIN SCALES - GENERAL
PAINLEVEL_OUTOF10: 9
PAINLEVEL_OUTOF10: 9
PAINLEVEL_OUTOF10: 8
PAINLEVEL_OUTOF10: 10 - WORST POSSIBLE PAIN
PAINLEVEL_OUTOF10: 10 - WORST POSSIBLE PAIN
PAIN_LEVEL: 9
PAINLEVEL_OUTOF10: 10 - WORST POSSIBLE PAIN
PAINLEVEL_OUTOF10: 8
PAINLEVEL_OUTOF10: 5 - MODERATE PAIN

## 2025-08-30 ASSESSMENT — COGNITIVE AND FUNCTIONAL STATUS - GENERAL
WALKING IN HOSPITAL ROOM: A LOT
CLIMB 3 TO 5 STEPS WITH RAILING: TOTAL
TURNING FROM BACK TO SIDE WHILE IN FLAT BAD: A LITTLE
DRESSING REGULAR LOWER BODY CLOTHING: A LOT
EATING MEALS: A LITTLE
MOVING FROM LYING ON BACK TO SITTING ON SIDE OF FLAT BED WITH BEDRAILS: A LITTLE
STANDING UP FROM CHAIR USING ARMS: A LITTLE
DRESSING REGULAR UPPER BODY CLOTHING: A LITTLE
MOVING TO AND FROM BED TO CHAIR: A LITTLE
PERSONAL GROOMING: A LITTLE
WALKING IN HOSPITAL ROOM: A LOT
EATING MEALS: A LITTLE
MOBILITY SCORE: 13
PERSONAL GROOMING: A LITTLE
CLIMB 3 TO 5 STEPS WITH RAILING: A LOT
DRESSING REGULAR LOWER BODY CLOTHING: A LOT
DAILY ACTIVITIY SCORE: 16
HELP NEEDED FOR BATHING: A LOT
STANDING UP FROM CHAIR USING ARMS: A LOT
DAILY ACTIVITIY SCORE: 15
TOILETING: A LOT
HELP NEEDED FOR BATHING: A LOT
TOILETING: A LITTLE
DRESSING REGULAR UPPER BODY CLOTHING: A LITTLE
TURNING FROM BACK TO SIDE WHILE IN FLAT BAD: A LITTLE
MOBILITY SCORE: 16
MOVING FROM LYING ON BACK TO SITTING ON SIDE OF FLAT BED WITH BEDRAILS: A LITTLE
MOVING TO AND FROM BED TO CHAIR: A LOT

## 2025-08-30 ASSESSMENT — PAIN - FUNCTIONAL ASSESSMENT
PAIN_FUNCTIONAL_ASSESSMENT: 0-10
PAIN_FUNCTIONAL_ASSESSMENT: 0-10

## 2025-08-30 ASSESSMENT — PAIN SCALES - GENERAL
PAINLEVEL_OUTOF10: 10 - WORST POSSIBLE PAIN
PAINLEVEL_OUTOF10: 8

## 2025-08-31 ENCOUNTER — APPOINTMENT (OUTPATIENT)
Dept: RADIOLOGY | Facility: HOSPITAL | Age: 75
End: 2025-08-31
Payer: MEDICARE

## 2025-08-31 PROCEDURE — 74018 RADEX ABDOMEN 1 VIEW: CPT

## 2025-08-31 ASSESSMENT — PAIN - FUNCTIONAL ASSESSMENT
PAIN_FUNCTIONAL_ASSESSMENT: 0-10

## 2025-08-31 ASSESSMENT — PAIN SCALES - GENERAL: PAINLEVEL_OUTOF10: 0 - NO PAIN

## 2025-09-01 ASSESSMENT — COGNITIVE AND FUNCTIONAL STATUS - GENERAL
TOILETING: A LITTLE
DRESSING REGULAR UPPER BODY CLOTHING: A LITTLE
MOVING TO AND FROM BED TO CHAIR: A LITTLE
EATING MEALS: A LITTLE
MOBILITY SCORE: 18
WALKING IN HOSPITAL ROOM: A LITTLE
DAILY ACTIVITIY SCORE: 18
CLIMB 3 TO 5 STEPS WITH RAILING: A LITTLE
TURNING FROM BACK TO SIDE WHILE IN FLAT BAD: A LITTLE
MOVING FROM LYING ON BACK TO SITTING ON SIDE OF FLAT BED WITH BEDRAILS: A LITTLE
PERSONAL GROOMING: A LITTLE
DRESSING REGULAR LOWER BODY CLOTHING: A LITTLE
HELP NEEDED FOR BATHING: A LITTLE
STANDING UP FROM CHAIR USING ARMS: A LITTLE

## 2025-09-01 ASSESSMENT — PAIN SCALES - GENERAL: PAINLEVEL_OUTOF10: 4

## 2025-09-01 ASSESSMENT — PAIN - FUNCTIONAL ASSESSMENT: PAIN_FUNCTIONAL_ASSESSMENT: 0-10

## 2025-09-02 ENCOUNTER — APPOINTMENT (OUTPATIENT)
Dept: CARDIOLOGY | Facility: HOSPITAL | Age: 75
End: 2025-09-02
Payer: MEDICARE

## 2025-09-02 ASSESSMENT — COGNITIVE AND FUNCTIONAL STATUS - GENERAL
CLIMB 3 TO 5 STEPS WITH RAILING: A LITTLE
HELP NEEDED FOR BATHING: A LITTLE
PERSONAL GROOMING: A LITTLE
EATING MEALS: A LITTLE
DRESSING REGULAR LOWER BODY CLOTHING: A LITTLE
DAILY ACTIVITIY SCORE: 18
DRESSING REGULAR LOWER BODY CLOTHING: A LITTLE
DRESSING REGULAR UPPER BODY CLOTHING: A LITTLE
DAILY ACTIVITIY SCORE: 19
DAILY ACTIVITIY SCORE: 19
MOVING FROM LYING ON BACK TO SITTING ON SIDE OF FLAT BED WITH BEDRAILS: A LITTLE
STANDING UP FROM CHAIR USING ARMS: A LITTLE
DRESSING REGULAR LOWER BODY CLOTHING: A LITTLE
CLIMB 3 TO 5 STEPS WITH RAILING: A LITTLE
TURNING FROM BACK TO SIDE WHILE IN FLAT BAD: A LITTLE
MOVING FROM LYING ON BACK TO SITTING ON SIDE OF FLAT BED WITH BEDRAILS: A LITTLE
WALKING IN HOSPITAL ROOM: A LITTLE
PERSONAL GROOMING: A LITTLE
TOILETING: A LITTLE
MOBILITY SCORE: 18
MOVING TO AND FROM BED TO CHAIR: A LITTLE
TOILETING: A LITTLE
TOILETING: A LITTLE
HELP NEEDED FOR BATHING: A LITTLE
PERSONAL GROOMING: A LITTLE
STANDING UP FROM CHAIR USING ARMS: A LITTLE
TURNING FROM BACK TO SIDE WHILE IN FLAT BAD: A LITTLE
CLIMB 3 TO 5 STEPS WITH RAILING: A LITTLE
MOVING TO AND FROM BED TO CHAIR: A LITTLE
MOVING TO AND FROM BED TO CHAIR: A LITTLE
MOVING FROM LYING ON BACK TO SITTING ON SIDE OF FLAT BED WITH BEDRAILS: A LITTLE
WALKING IN HOSPITAL ROOM: A LITTLE
STANDING UP FROM CHAIR USING ARMS: A LITTLE
MOBILITY SCORE: 18
DRESSING REGULAR UPPER BODY CLOTHING: A LITTLE
MOBILITY SCORE: 18
TURNING FROM BACK TO SIDE WHILE IN FLAT BAD: A LITTLE
DRESSING REGULAR UPPER BODY CLOTHING: A LITTLE
HELP NEEDED FOR BATHING: A LITTLE
WALKING IN HOSPITAL ROOM: A LITTLE

## 2025-09-02 ASSESSMENT — PAIN SCALES - GENERAL
PAINLEVEL_OUTOF10: 7
PAINLEVEL_OUTOF10: 0 - NO PAIN

## 2025-09-03 ASSESSMENT — COGNITIVE AND FUNCTIONAL STATUS - GENERAL
DAILY ACTIVITIY SCORE: 24
WALKING IN HOSPITAL ROOM: A LITTLE
DRESSING REGULAR UPPER BODY CLOTHING: A LITTLE
TOILETING: A LITTLE
STANDING UP FROM CHAIR USING ARMS: A LITTLE
HELP NEEDED FOR BATHING: A LITTLE
CLIMB 3 TO 5 STEPS WITH RAILING: A LITTLE
CLIMB 3 TO 5 STEPS WITH RAILING: A LOT
DRESSING REGULAR LOWER BODY CLOTHING: A LITTLE
STANDING UP FROM CHAIR USING ARMS: A LITTLE
DAILY ACTIVITIY SCORE: 20
MOVING TO AND FROM BED TO CHAIR: A LITTLE
WALKING IN HOSPITAL ROOM: A LITTLE
MOBILITY SCORE: 19
MOBILITY SCORE: 20
MOVING TO AND FROM BED TO CHAIR: A LITTLE

## 2025-09-03 ASSESSMENT — PAIN - FUNCTIONAL ASSESSMENT: PAIN_FUNCTIONAL_ASSESSMENT: WONG-BAKER FACES

## 2025-09-03 ASSESSMENT — PAIN SCALES - WONG BAKER
WONGBAKER_NUMERICALRESPONSE: HURTS EVEN MORE
WONGBAKER_NUMERICALRESPONSE: HURTS WHOLE LOT
WONGBAKER_NUMERICALRESPONSE: HURTS LITTLE BIT
WONGBAKER_NUMERICALRESPONSE: HURTS WHOLE LOT

## 2025-09-03 ASSESSMENT — PAIN SCALES - GENERAL
PAINLEVEL_OUTOF10: 0 - NO PAIN
PAINLEVEL_OUTOF10: 0 - NO PAIN

## 2025-09-04 ASSESSMENT — COGNITIVE AND FUNCTIONAL STATUS - GENERAL
MOBILITY SCORE: 24
DAILY ACTIVITIY SCORE: 24

## 2025-09-04 ASSESSMENT — PAIN SCALES - GENERAL
PAINLEVEL_OUTOF10: 5 - MODERATE PAIN
PAINLEVEL_OUTOF10: 5 - MODERATE PAIN
PAINLEVEL_OUTOF10: 4

## 2025-09-04 ASSESSMENT — PAIN - FUNCTIONAL ASSESSMENT: PAIN_FUNCTIONAL_ASSESSMENT: 0-10

## 2025-09-05 ASSESSMENT — COGNITIVE AND FUNCTIONAL STATUS - GENERAL
HELP NEEDED FOR BATHING: A LOT
DRESSING REGULAR LOWER BODY CLOTHING: A LOT
MOVING TO AND FROM BED TO CHAIR: A LITTLE
MOBILITY SCORE: 17
PERSONAL GROOMING: A LITTLE
DAILY ACTIVITIY SCORE: 24
DAILY ACTIVITIY SCORE: 17
MOBILITY SCORE: 24
STANDING UP FROM CHAIR USING ARMS: A LITTLE
TURNING FROM BACK TO SIDE WHILE IN FLAT BAD: A LITTLE
TOILETING: A LITTLE
CLIMB 3 TO 5 STEPS WITH RAILING: A LOT
DRESSING REGULAR UPPER BODY CLOTHING: A LITTLE
WALKING IN HOSPITAL ROOM: A LITTLE
MOVING FROM LYING ON BACK TO SITTING ON SIDE OF FLAT BED WITH BEDRAILS: A LITTLE

## 2025-09-05 ASSESSMENT — PAIN SCALES - GENERAL
PAINLEVEL_OUTOF10: 5 - MODERATE PAIN
PAINLEVEL_OUTOF10: 8
PAINLEVEL_OUTOF10: 4
PAINLEVEL_OUTOF10: 8
PAINLEVEL_OUTOF10: 7
PAINLEVEL_OUTOF10: 7

## 2025-09-05 ASSESSMENT — PAIN - FUNCTIONAL ASSESSMENT
PAIN_FUNCTIONAL_ASSESSMENT: 0-10

## 2025-09-05 ASSESSMENT — ACTIVITIES OF DAILY LIVING (ADL): ADL_ASSISTANCE: INDEPENDENT

## 2025-09-06 ASSESSMENT — PAIN SCALES - GENERAL
PAINLEVEL_OUTOF10: 10 - WORST POSSIBLE PAIN
PAINLEVEL_OUTOF10: 10 - WORST POSSIBLE PAIN

## 2025-09-06 ASSESSMENT — COGNITIVE AND FUNCTIONAL STATUS - GENERAL
WALKING IN HOSPITAL ROOM: A LITTLE
MOVING TO AND FROM BED TO CHAIR: A LITTLE
TOILETING: A LOT
MOBILITY SCORE: 20
PERSONAL GROOMING: A LITTLE
DAILY ACTIVITIY SCORE: 20
DRESSING REGULAR UPPER BODY CLOTHING: A LITTLE
STANDING UP FROM CHAIR USING ARMS: A LITTLE
DRESSING REGULAR LOWER BODY CLOTHING: A LITTLE
MOVING TO AND FROM BED TO CHAIR: A LITTLE
DRESSING REGULAR LOWER BODY CLOTHING: A LITTLE
STANDING UP FROM CHAIR USING ARMS: A LITTLE
MOBILITY SCORE: 19
WALKING IN HOSPITAL ROOM: A LITTLE
CLIMB 3 TO 5 STEPS WITH RAILING: A LITTLE
TOILETING: A LITTLE
HELP NEEDED FOR BATHING: A LOT
DRESSING REGULAR UPPER BODY CLOTHING: A LITTLE
CLIMB 3 TO 5 STEPS WITH RAILING: A LOT
DAILY ACTIVITIY SCORE: 17
HELP NEEDED FOR BATHING: A LITTLE

## 2025-09-07 ASSESSMENT — COGNITIVE AND FUNCTIONAL STATUS - GENERAL
WALKING IN HOSPITAL ROOM: A LITTLE
STANDING UP FROM CHAIR USING ARMS: A LITTLE
DRESSING REGULAR UPPER BODY CLOTHING: A LITTLE
DAILY ACTIVITIY SCORE: 20
MOVING TO AND FROM BED TO CHAIR: A LITTLE
TOILETING: A LITTLE
MOBILITY SCORE: 20
DRESSING REGULAR LOWER BODY CLOTHING: A LITTLE
WALKING IN HOSPITAL ROOM: A LITTLE
CLIMB 3 TO 5 STEPS WITH RAILING: A LITTLE
STANDING UP FROM CHAIR USING ARMS: A LITTLE
CLIMB 3 TO 5 STEPS WITH RAILING: A LITTLE
TOILETING: A LITTLE
DRESSING REGULAR LOWER BODY CLOTHING: A LITTLE
MOVING TO AND FROM BED TO CHAIR: A LITTLE
HELP NEEDED FOR BATHING: A LITTLE
HELP NEEDED FOR BATHING: A LITTLE
DRESSING REGULAR UPPER BODY CLOTHING: A LITTLE

## 2025-09-07 ASSESSMENT — PAIN SCALES - GENERAL: PAINLEVEL_OUTOF10: 7

## 2025-09-23 ENCOUNTER — APPOINTMENT (OUTPATIENT)
Dept: CARDIOLOGY | Facility: HOSPITAL | Age: 75
End: 2025-09-23
Payer: MEDICARE

## 2025-10-08 ENCOUNTER — APPOINTMENT (OUTPATIENT)
Dept: PRIMARY CARE | Facility: CLINIC | Age: 75
End: 2025-10-08
Payer: MEDICARE

## 2025-12-18 ENCOUNTER — APPOINTMENT (OUTPATIENT)
Facility: CLINIC | Age: 75
End: 2025-12-18
Payer: MEDICARE

## (undated) DEVICE — Device

## (undated) DEVICE — COVER HANDLE LIGHT, STERIS, BLUE, STERILE

## (undated) DEVICE — NEEDLE, PERCUTANEOUS ENTRY, THINWALL, W/O BASEPLATE, 18 G X 7 CM

## (undated) DEVICE — GUIDEWIRE, ANGLE TIP,  .035 DIA, 180 CM, 3 CM TIP"

## (undated) DEVICE — LABELING SYSTEM, CORRECT MEDICATION, CATH LAB

## (undated) DEVICE — INTRODUCER SET, MICROPUNCTURE, W/NITINOL GUIDEWIRE, 5 FR X 10 CM

## (undated) DEVICE — DRAPE, SHEET, LAPAROTOMY, TRANSVERSE, W/FENESTRATION, 77 X 122 IN, DISPOSABLE, LF, STERILE

## (undated) DEVICE — NEEDLE, HYPODERMIC, REGULAR WALL, REGULAR BEVEL, 25 G X 1.5 IN

## (undated) DEVICE — DRESSING, TRANSPARENT, TEGADERM, 4 X 4-3/4 IN

## (undated) DEVICE — CONTROL WIRE, .018 X 300

## (undated) DEVICE — DRESSING, GAUZE, SPONGE, VERSALON, ALL PURPOSE, 4 X 4 IN, SOFT

## (undated) DEVICE — WIPE, INSTRUMENT, POLYVINYL ALCOHOL, 2 1/2 X 2 1/2

## (undated) DEVICE — INTRODUCER, PINNACLE, 6 FR, 10 CM

## (undated) DEVICE — TOWEL PACK, STERILE, 4/PACK, BLUE

## (undated) DEVICE — SET-UP PACK, BASIC, MAYO STAND COVER, SUTURE BAG, TABLE COVER, LF

## (undated) DEVICE — DRAPE, EQUIPMENT, 50 X 25IN, F/XRAY C-ARM

## (undated) DEVICE — TUBING, FLEXCIL,CONTRAST INJECTION, HIGH PRESSURE, 48 IN

## (undated) DEVICE — SYRINGE, 10 CC, LUER LOCK

## (undated) DEVICE — GUIDEWIRE, ANGLE TIP,  .035 DIA, 260 CM, 3 CM TIP"

## (undated) DEVICE — APPLICATOR, CHLORAPREP, W/ORANGE TINT, 26ML

## (undated) DEVICE — COUNTER, FOAM STRIP & NEEDLE

## (undated) DEVICE — DEVICE, INFLATION, PRESTO ID40ATM 30CC

## (undated) DEVICE — CATHETER, IMAGER II, ANGIOGRAPHY, 5F, 65CML BERN .038

## (undated) DEVICE — DRAPE, SHEET, THREE QUARTER, FAN FOLD, 57 X 77 IN

## (undated) DEVICE — GLOVE, PROTEXIS PI CLASSIC, SZ-7.0, PF, LF

## (undated) DEVICE — SYRINGE, 3 CC, LUER LOCK

## (undated) DEVICE — SOLUTION, IRRIGATION, STERILE WATER, 1000 ML, POUR BOTTLE

## (undated) DEVICE — SYRINGE, 5 CC, LUER LOCK

## (undated) DEVICE — SPONGE, GAUZE, XRAY DECT, 16 PLY, 4 X 4, W/MASTER WDMT,STERILE

## (undated) DEVICE — GOWN, ASTOUND, XL

## (undated) DEVICE — COVER, PLASTIC, MAYO STAND, 29.5IN X 55.5IN

## (undated) DEVICE — SYRINGE, INJECTOR, W/HANDI-FILL, ILLUMENA, LUER, 150ML